# Patient Record
Sex: FEMALE | Race: WHITE | NOT HISPANIC OR LATINO | Employment: OTHER | URBAN - METROPOLITAN AREA
[De-identification: names, ages, dates, MRNs, and addresses within clinical notes are randomized per-mention and may not be internally consistent; named-entity substitution may affect disease eponyms.]

---

## 2017-01-26 ENCOUNTER — HOSPITAL ENCOUNTER (OUTPATIENT)
Dept: PET IMAGING | Facility: HOSPITAL | Age: 67
Discharge: HOME | End: 2017-01-26
Attending: INTERNAL MEDICINE | Admitting: INTERNAL MEDICINE

## 2017-01-26 ENCOUNTER — LAB (OUTPATIENT)
Dept: LAB | Facility: HOSPITAL | Age: 67
End: 2017-01-26

## 2017-01-26 DIAGNOSIS — C49.A3 GIST (GASTROINTESTINAL STROMAL TUMOR) OF SMALL BOWEL, MALIGNANT (HCC): ICD-10-CM

## 2017-01-26 DIAGNOSIS — D50.0 IRON DEFICIENCY ANEMIA DUE TO CHRONIC BLOOD LOSS: ICD-10-CM

## 2017-01-26 LAB
ALBUMIN SERPL-MCNC: 4.3 G/DL (ref 3.5–5.2)
ALBUMIN/GLOB SERPL: 1.4 G/DL (ref 1.1–2.4)
ALP SERPL-CCNC: 74 U/L (ref 38–116)
ALT SERPL W P-5'-P-CCNC: 22 U/L (ref 0–33)
ANION GAP SERPL CALCULATED.3IONS-SCNC: 13.1 MMOL/L
AST SERPL-CCNC: 22 U/L (ref 0–32)
BASOPHILS # BLD AUTO: 0.07 10*3/MM3 (ref 0–0.1)
BASOPHILS NFR BLD AUTO: 1.5 % (ref 0–1.1)
BILIRUB SERPL-MCNC: 0.6 MG/DL (ref 0.1–1.2)
BUN BLD-MCNC: 19 MG/DL (ref 6–20)
BUN/CREAT SERPL: 30.2 (ref 7.3–30)
CALCIUM SPEC-SCNC: 10.2 MG/DL (ref 8.5–10.2)
CHLORIDE SERPL-SCNC: 99 MMOL/L (ref 98–107)
CO2 SERPL-SCNC: 27.9 MMOL/L (ref 22–29)
CREAT BLD-MCNC: 0.63 MG/DL (ref 0.6–1.1)
CREAT BLDA-MCNC: 0.7 MG/DL (ref 0.6–1.3)
DEPRECATED RDW RBC AUTO: 41.7 FL (ref 37–49)
EOSINOPHIL # BLD AUTO: 0.17 10*3/MM3 (ref 0–0.36)
EOSINOPHIL NFR BLD AUTO: 3.7 % (ref 1–5)
ERYTHROCYTE [DISTWIDTH] IN BLOOD BY AUTOMATED COUNT: 12.3 % (ref 11.7–14.5)
FERRITIN SERPL-MCNC: 77.8 NG/ML
GFR SERPL CREATININE-BSD FRML MDRD: 94 ML/MIN/1.73
GLOBULIN UR ELPH-MCNC: 3 GM/DL (ref 1.8–3.5)
GLUCOSE BLD-MCNC: 60 MG/DL (ref 74–124)
HCT VFR BLD AUTO: 45 % (ref 34–45)
HGB BLD-MCNC: 14.2 G/DL (ref 11.5–14.9)
IMM GRANULOCYTES # BLD: 0.01 10*3/MM3 (ref 0–0.03)
IMM GRANULOCYTES NFR BLD: 0.2 % (ref 0–0.5)
LYMPHOCYTES # BLD AUTO: 1.17 10*3/MM3 (ref 1–3.5)
LYMPHOCYTES NFR BLD AUTO: 25.2 % (ref 20–49)
MCH RBC QN AUTO: 29.1 PG (ref 27–33)
MCHC RBC AUTO-ENTMCNC: 31.6 G/DL (ref 32–35)
MCV RBC AUTO: 92.2 FL (ref 83–97)
MONOCYTES # BLD AUTO: 0.45 10*3/MM3 (ref 0.25–0.8)
MONOCYTES NFR BLD AUTO: 9.7 % (ref 4–12)
NEUTROPHILS # BLD AUTO: 2.77 10*3/MM3 (ref 1.5–7)
NEUTROPHILS NFR BLD AUTO: 59.7 % (ref 39–75)
NRBC BLD MANUAL-RTO: 0 /100 WBC (ref 0–0)
PLATELET # BLD AUTO: 336 10*3/MM3 (ref 150–375)
PMV BLD AUTO: 9.7 FL (ref 8.9–12.1)
POTASSIUM BLD-SCNC: 3.6 MMOL/L (ref 3.5–4.7)
PROT SERPL-MCNC: 7.3 G/DL (ref 6.3–8)
RBC # BLD AUTO: 4.88 10*6/MM3 (ref 3.9–5)
SODIUM BLD-SCNC: 140 MMOL/L (ref 134–145)
WBC NRBC COR # BLD: 4.64 10*3/MM3 (ref 4–10)

## 2017-01-26 PROCEDURE — 0 IOPAMIDOL 61 % SOLUTION: Performed by: INTERNAL MEDICINE

## 2017-01-26 PROCEDURE — 82728 ASSAY OF FERRITIN: CPT

## 2017-01-26 PROCEDURE — 82565 ASSAY OF CREATININE: CPT

## 2017-01-26 PROCEDURE — 80053 COMPREHEN METABOLIC PANEL: CPT

## 2017-01-26 PROCEDURE — 36415 COLL VENOUS BLD VENIPUNCTURE: CPT

## 2017-01-26 PROCEDURE — 74177 CT ABD & PELVIS W/CONTRAST: CPT

## 2017-01-26 PROCEDURE — 71260 CT THORAX DX C+: CPT

## 2017-01-26 PROCEDURE — 85025 COMPLETE CBC W/AUTO DIFF WBC: CPT

## 2017-01-26 PROCEDURE — 25510000001 DIATRIZOATE MEGLUMINE & SODIUM PER 1 ML: Performed by: INTERNAL MEDICINE

## 2017-01-26 RX ADMIN — DIATRIZOATE MEGLUMINE AND DIATRIZOATE SODIUM 30 ML: 660; 100 LIQUID ORAL; RECTAL at 08:00

## 2017-01-26 RX ADMIN — IOPAMIDOL 85 ML: 612 INJECTION, SOLUTION INTRAVENOUS at 08:47

## 2017-02-02 ENCOUNTER — APPOINTMENT (OUTPATIENT)
Dept: LAB | Facility: HOSPITAL | Age: 67
End: 2017-02-02

## 2017-02-02 ENCOUNTER — OFFICE VISIT (OUTPATIENT)
Dept: ONCOLOGY | Facility: CLINIC | Age: 67
End: 2017-02-02

## 2017-02-02 VITALS
WEIGHT: 146.4 LBS | SYSTOLIC BLOOD PRESSURE: 112 MMHG | DIASTOLIC BLOOD PRESSURE: 70 MMHG | RESPIRATION RATE: 16 BRPM | HEART RATE: 76 BPM | BODY MASS INDEX: 25 KG/M2 | TEMPERATURE: 98.3 F | HEIGHT: 64 IN

## 2017-02-02 DIAGNOSIS — D50.0 IRON DEFICIENCY ANEMIA DUE TO CHRONIC BLOOD LOSS: ICD-10-CM

## 2017-02-02 DIAGNOSIS — C49.A3 GIST (GASTROINTESTINAL STROMAL TUMOR) OF SMALL BOWEL, MALIGNANT (HCC): Primary | ICD-10-CM

## 2017-02-02 PROCEDURE — 99213 OFFICE O/P EST LOW 20 MIN: CPT | Performed by: INTERNAL MEDICINE

## 2017-02-02 PROCEDURE — G0463 HOSPITAL OUTPT CLINIC VISIT: HCPCS | Performed by: INTERNAL MEDICINE

## 2017-02-02 NOTE — PROGRESS NOTES
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  HISTORY OF PRESENT ILLNESS:     History of Present Illness     Mrs. Comer is here today for her first outpatient visit to our office.  She had been seen in consultation during her recent admission to [facility] from 2/20/2016 to 2/26/2016.  She was admitted at that time with symptomatic anemia and GI bleeding.  On endoscopy the blood appeared to be coming from the region of the appendix and she underwent a laparoscopic appendectomy with Dr. Meraz on 2/22/2016.  Also at the same procedure she was found to have a tumor within the small bowel and had a partial small bowel resection with reanastomosis.  The pathology from that specimen showed a gastrointestinal stromal tumor (GIST) .  The tumor was 4.5 cm and appeared to be low-grade with 0 mitotic figures per high-powered field.    We discussed with Miss Comer at that time that we would not recommend any postop adjuvant Gleevec therapy as her prognosis was very good with surgery alone.  We did however discuss some follow-up in the office with plans to perform surveillance CT scans for a few years after surgery.    She underwent CT scans on 1/26/2017 which showed postop changes in the small bowel but no obvious recurrent or metastatic disease.  Her blood counts and iron studies and chemistries all look good at this point.  She reports that she is feeling fine.  Unfortunately, her  passed away in September due to metastatic esophageal cancer and she is still grieving.    Past Medical History, Past Surgical History, Social History, Family History have been reviewed and are without significant changes except as mentioned.    Review of Systems   Constitutional: Negative for activity change, appetite change, fatigue, fever and unexpected weight change.   HENT: Negative for hearing loss, nosebleeds, trouble swallowing and voice change.    Eyes: Negative for visual disturbance.  "  Respiratory: Negative for cough, shortness of breath and wheezing.    Cardiovascular: Negative for chest pain and palpitations.   Gastrointestinal: Negative for abdominal pain, diarrhea, nausea and vomiting.   Genitourinary: Negative for difficulty urinating, frequency, hematuria and urgency.   Musculoskeletal: Negative for back pain and neck pain.   Skin: Negative for rash.   Neurological: Negative for dizziness, seizures, syncope and headaches.   Hematological: Negative for adenopathy. Does not bruise/bleed easily.   Psychiatric/Behavioral: Negative for behavioral problems. The patient is not nervous/anxious.       A comprehensive 14 point review of systems was performed and was negative except as mentioned.    Medications:  The current medication list was reviewed in the EMR    ALLERGIES:    Allergies   Allergen Reactions   • Penicillins Rash     Rash over whole body       Objective      Vitals:    02/02/17 1051   BP: 112/70   Pulse: 76   Resp: 16   Temp: 98.3 °F (36.8 °C)   Weight: 146 lb 6.4 oz (66.4 kg)   Height: 63.78\" (162 cm)  Comment: new ht.   PainSc: 0-No pain     Current Status 2/2/2017   ECOG score 0       Physical Exam   Constitutional: She is oriented to person, place, and time. She appears well-developed and well-nourished. No distress.   HENT:   Head: Normocephalic.   Eyes: Conjunctivae and EOM are normal. Pupils are equal, round, and reactive to light. No scleral icterus.   Neck: Normal range of motion. Neck supple. No JVD present. No thyromegaly present.   Cardiovascular: Normal rate and regular rhythm.  Exam reveals no gallop and no friction rub.    No murmur heard.  Pulmonary/Chest: Effort normal and breath sounds normal. She has no wheezes. She has no rales.   Abdominal: Soft. She exhibits no distension and no mass. There is no tenderness.   Musculoskeletal: Normal range of motion. She exhibits no edema or deformity.   Lymphadenopathy:     She has no cervical adenopathy.   Neurological: She " is alert and oriented to person, place, and time. She has normal reflexes. No cranial nerve deficit.   Skin: Skin is warm and dry. No rash noted. No erythema.   Psychiatric: She has a normal mood and affect. Her behavior is normal. Judgment normal.        RECENT LABS:  Hematology WBC   Date Value Ref Range Status   01/26/2017 4.64 4.00 - 10.00 10*3/mm3 Final   03/08/2016 5.67 4.50 - 10.70 10*3/mm3 Final     RBC   Date Value Ref Range Status   01/26/2017 4.88 3.90 - 5.00 10*6/mm3 Final   03/08/2016 4.20 3.90 - 5.20 10*6/mm3 Final     HEMOGLOBIN   Date Value Ref Range Status   01/26/2017 14.2 11.5 - 14.9 g/dL Final   03/08/2016 12.0 11.9 - 15.5 g/dL Final     HEMATOCRIT   Date Value Ref Range Status   01/26/2017 45.0 34.0 - 45.0 % Final   03/08/2016 39.3 35.6 - 45.5 % Final     PLATELETS   Date Value Ref Range Status   01/26/2017 336 150 - 375 10*3/mm3 Final   03/08/2016 549 (H) 140 - 500 10*3/mm3 Final         CT C/A/P 1/26/2017     IMPRESSION:  Stable examination. There is no evidence for recurrence or  metastatic disease. No new abnormality is seen.    Ferritin ng/mL 77.80     Lab Results   Component Value Date    GLUCOSE 60 (L) 01/26/2017    BUN 19 01/26/2017    CREATININE 0.70 01/26/2017    EGFRIFNONA 94 01/26/2017    EGFRIFAFRI 109 03/08/2016    BCR 30.2 (H) 01/26/2017    CO2 27.9 01/26/2017    CALCIUM 10.2 01/26/2017    PROTENTOTREF 7.1 03/02/2015    ALBUMIN 4.30 01/26/2017    LABIL2 1.4 01/26/2017    AST 22 01/26/2017    ALT 22 01/26/2017             Assessment/Plan      1.  Gastrointestinal stromal tumor of the small bowel resected 2/22/2016 by Dr. Meraz with good margins.  The tumor appeared to be low-grade with good prognostic features.  Patient continues to do well and CT scans show no definite evidence of recurrent or metastatic disease.  2.  Iron deficiency anemia due to GI blood loss. Resolved.     Plan    1. We will schedule a follow-up visit in our office in 12 months with repeat CT scan and labs one  week prior to the visit.                    2/2/2017      CC:

## 2017-03-14 DIAGNOSIS — Z11.59 NEED FOR HEPATITIS C SCREENING TEST: ICD-10-CM

## 2017-03-14 DIAGNOSIS — E78.2 MIXED HYPERLIPIDEMIA: Primary | ICD-10-CM

## 2017-03-15 ENCOUNTER — RESULTS ENCOUNTER (OUTPATIENT)
Dept: INTERNAL MEDICINE | Facility: CLINIC | Age: 67
End: 2017-03-15

## 2017-03-15 DIAGNOSIS — Z11.59 NEED FOR HEPATITIS C SCREENING TEST: ICD-10-CM

## 2017-03-15 DIAGNOSIS — E78.2 MIXED HYPERLIPIDEMIA: ICD-10-CM

## 2017-03-16 LAB
CHOLEST SERPL-MCNC: 241 MG/DL (ref 0–200)
HCV AB S/CO SERPL IA: 0.1 S/CO RATIO (ref 0–0.9)
HDLC SERPL-MCNC: 99 MG/DL (ref 40–60)
LDLC SERPL CALC-MCNC: 132 MG/DL (ref 0–100)
TRIGL SERPL-MCNC: 51 MG/DL (ref 0–150)
VLDLC SERPL CALC-MCNC: 10.2 MG/DL (ref 5–40)

## 2017-03-21 RX ORDER — OMEPRAZOLE 40 MG/1
CAPSULE, DELAYED RELEASE ORAL
Qty: 30 CAPSULE | Refills: 3 | Status: SHIPPED | OUTPATIENT
Start: 2017-03-21 | End: 2017-07-18 | Stop reason: SDUPTHER

## 2017-03-22 ENCOUNTER — OFFICE VISIT (OUTPATIENT)
Dept: INTERNAL MEDICINE | Facility: CLINIC | Age: 67
End: 2017-03-22

## 2017-03-22 VITALS
HEIGHT: 64 IN | SYSTOLIC BLOOD PRESSURE: 118 MMHG | RESPIRATION RATE: 18 BRPM | DIASTOLIC BLOOD PRESSURE: 70 MMHG | OXYGEN SATURATION: 98 % | WEIGHT: 147 LBS | BODY MASS INDEX: 25.1 KG/M2 | HEART RATE: 74 BPM

## 2017-03-22 DIAGNOSIS — Z85.09 HISTORY OF GASTROINTESTINAL STROMAL TUMOR (GIST): ICD-10-CM

## 2017-03-22 DIAGNOSIS — Z00.00 MEDICARE ANNUAL WELLNESS VISIT, SUBSEQUENT: Primary | ICD-10-CM

## 2017-03-22 DIAGNOSIS — K62.1 RECTAL POLYP: ICD-10-CM

## 2017-03-22 DIAGNOSIS — E78.2 MIXED HYPERLIPIDEMIA: ICD-10-CM

## 2017-03-22 DIAGNOSIS — M25.512 ACUTE PAIN OF LEFT SHOULDER: ICD-10-CM

## 2017-03-22 PROBLEM — D50.0 IRON DEFICIENCY ANEMIA DUE TO CHRONIC BLOOD LOSS: Status: RESOLVED | Noted: 2017-02-02 | Resolved: 2017-03-22

## 2017-03-22 PROCEDURE — 99213 OFFICE O/P EST LOW 20 MIN: CPT | Performed by: INTERNAL MEDICINE

## 2017-03-22 PROCEDURE — G0439 PPPS, SUBSEQ VISIT: HCPCS | Performed by: INTERNAL MEDICINE

## 2017-03-22 NOTE — PROGRESS NOTES
Medicare Annual Wellness Visit    Chief Complaint:  Annual Exam (SUB AWV); Shoulder Pain; Colon Polyps; and Hyperlipidemia      History of Present Illness    Ashley Comer is a 66 y.o. female who presents for an Annual Wellness Visit. In addition, we addressed the following health issues:    Mammo:10/28/15, 11/2016  Pap:10/28/15 [facility]  DEXA: she has this every two to three years at [facility].    C-scope:2/21/16  Flu shot:10/1/2016  Dental Exam: two months ago.  She goes every six months.    Eye Exam: December 2016  Exercise: she is walking with her sister regularly.  Yoga on Mondays.      Advanced Care Planning:  has an advanced directive - a copy HAS NOT been provided   Immunization History   Administered Date(s) Administered   • Pneumococcal Conjugate 13-Valent 06/22/2015   • Pneumococcal Polysaccharide 03/16/2016   • Tdap 02/22/2012     She has some achiness in her left shoulder.  She has some tingling in the deltoid area.  It's been ongoing for a few week.  No injury.      HLD - she has had a total cholesterol over 200 for her whole life.  She has maintained a nice high HDL.  No CP or palp.      GIST resection was done in Feb of 2016.  She had a GIB and it was discovered at this time.  Dr. Rodriguez did EGD and c-scope.  On the c-scope she had a rectal polyp that was not removed.  She is wondering when she needs to have a repeat c-scope.  Dr Meraz is who did her surgery.  No blood in her stools.  No change in bm.      Her  passed away in November from esophageal cancer.  The two of them had been diagnosed with their cancers within days of each other.  She has been doing ok.  Her family is very supportive.      Review of Systems   Constitutional: Negative for chills, fatigue and fever.   HENT: Negative for hearing loss, sore throat, tinnitus, trouble swallowing and voice change.    Eyes: Negative for visual disturbance.   Respiratory: Negative for cough and shortness of breath.    Cardiovascular:  Positive for palpitations (occ). Negative for chest pain and leg swelling.   Gastrointestinal: Negative for abdominal distention, abdominal pain, anal bleeding, blood in stool, constipation, diarrhea, nausea and vomiting.   Endocrine: Negative for polydipsia and polyuria.   Genitourinary: Negative for dysuria and hematuria.   Musculoskeletal: Positive for arthralgias (left shoulder) and neck pain. Negative for myalgias.   Skin: Negative for rash and wound.   Neurological: Negative for dizziness, syncope, light-headedness and headaches.   Hematological: Negative for adenopathy. Does not bruise/bleed easily.   Psychiatric/Behavioral: Negative for confusion and dysphoric mood. The patient is not nervous/anxious.        Past Medical History:   Diagnosis Date   • Anemia    • Blood tests for routine general physical examination    • Edema    • Esophageal reflux    • IFG (impaired fasting glucose)    • Migraine    • 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 Jose Enrique Yuan.        Family  History   Problem Relation Age of Onset   • Dementia Mother    • Hypertension Mother    • Tuberculosis Mother    • Prostate cancer Father    • Tuberculosis Father    • Cancer Sister      teratoma, malignant    • Breast cancer Maternal Aunt    • Lung cancer Paternal Aunt    • Brain cancer Paternal Aunt    • Breast cancer Paternal Aunt    • Breast cancer Sister        Allergies   Allergen Reactions   • Penicillins Rash     Rash over whole body       Current Outpatient Prescriptions on File Prior to Visit   Medication Sig Dispense Refill   • B Complex-C (SUPER B COMPLEX PO) Take 1 tablet by mouth daily. As directed     • calcium citrate-vitamin d (CITRACAL) 200-250 MG-UNIT tablet tablet Take  by mouth 2 (two) times a day.     • Cholecalciferol (VITAMIN D3) 1000 UNITS capsule Take  by mouth. Take as directed     • Coenzyme Q10 (CO Q 10 PO) Take 100 mg by mouth daily.     • glucosamine-chondroitin 500-400 MG capsule capsule Take  by mouth 3 (three) times a day with meals.     • HYALURONIC ACID-VITAMIN C PO Take  by mouth.     • Misc Natural Products (OSTEO BI-FLEX TRIPLE STRENGTH PO) Take 2 tablets by mouth.     • Multiple Vitamins-Minerals (MULTI FOR HER) tablet Take 1 tablet by mouth daily.     • omeprazole (priLOSEC) 40 MG capsule TAKE ONE CAPSULE BY MOUTH DAILY 30 capsule 3   • Psyllium 500 MG capsule Take  by mouth. Take as directed     • rizatriptan (MAXALT) 10 MG tablet Take  by mouth. Take 1 tablet at onset of headache; may repeat 2 hours later for x 1 dose; max of 2/day     • [DISCONTINUED] estradiol (VAGIFEM) 10 MCG tablet vaginal tablet Insert  into the vagina.       No current facility-administered medications on file prior to visit.        Patient Active Problem List   Diagnosis   • Migraine   • GIST (gastrointestinal stromal tumor) of small bowel, malignant   • Hoarseness of voice   • Mixed hyperlipidemia   • Acute pain of left shoulder   • History of gastrointestinal stromal tumor (GIST)   • Rectal polyp  "      Health Risk Assessment/Depression Screen/Functional and Cognitive Screening:   The patient has completed a Health Risk Assessment & Depression screen. These have been reviewed with them and have been scanned as a separate documents.    Age-appropriate Screening Schedule:  Refer to the list below for future screening recommendations based on patient's age. Orders for these recommended tests are listed in the plan section. The patient has been provided with a written plan.     Vitals:    03/22/17 0832   BP: 118/70   Pulse: 74   Resp: 18   SpO2: 98%   Weight: 147 lb (66.7 kg)   Height: 63.5\" (161.3 cm)       Body mass index is 25.63 kg/(m^2).    Physical Exam   Constitutional: She is oriented to person, place, and time. She appears well-developed and well-nourished. No distress.   HENT:   Head: Normocephalic and atraumatic.   Nose: Nose normal.   Mouth/Throat: Oropharynx is clear and moist.   Eyes: Conjunctivae and EOM are normal. Pupils are equal, round, and reactive to light. No scleral icterus.   Neck: Normal range of motion. Neck supple. No thyromegaly present.   Cardiovascular: Normal rate, regular rhythm and normal heart sounds.  Exam reveals no gallop and no friction rub.    No murmur heard.  Pulses:       Carotid pulses are 2+ on the right side, and 2+ on the left side.       Femoral pulses are 2+ on the right side, and 2+ on the left side.       Dorsalis pedis pulses are 2+ on the right side, and 2+ on the left side.        Posterior tibial pulses are 2+ on the right side, and 2+ on the left side.   Pulmonary/Chest: Effort normal and breath sounds normal. No respiratory distress. She has no wheezes. She has no rales. Right breast exhibits no mass and no nipple discharge. Left breast exhibits no mass and no nipple discharge.   Abdominal: Soft. Bowel sounds are normal. She exhibits no distension and no mass. There is no tenderness.   Musculoskeletal: Normal range of motion. She exhibits no edema.       " Vascular Status -  Her exam exhibits no right foot edema. Her exam exhibits no left foot edema.   Skin Integrity  -  Her right foot skin is intact.     Ashley 's left foot skin is intact. .  Lymphadenopathy:     She has no cervical adenopathy.     She has no axillary adenopathy.        Right: No inguinal and no supraclavicular adenopathy present.        Left: No inguinal and no supraclavicular adenopathy present.   Neurological: She is alert and oriented to person, place, and time. She has normal reflexes. No cranial nerve deficit.   Skin: Skin is warm and dry.   Psychiatric: She has a normal mood and affect. Her speech is normal and behavior is normal. Judgment and thought content normal. Cognition and memory are normal.   Vitals reviewed.      Results for orders placed or performed in visit on 03/15/17   Lipid Panel   Result Value Ref Range    Total Cholesterol 241 (H) 0 - 200 mg/dL    Triglycerides 51 0 - 150 mg/dL    HDL Cholesterol 99 (H) 40 - 60 mg/dL    VLDL Cholesterol 10.2 5 - 40 mg/dL    LDL Cholesterol  132 (H) 0 - 100 mg/dL   Hepatitis C Antibody   Result Value Ref Range    Hep C Virus Ab 0.1 0.0 - 0.9 s/co ratio       Assessment & Plan:    Diagnoses and all orders for this visit:    Medicare annual wellness visit, subsequent    Acute pain of left shoulder  -     Ambulatory Referral to Physical Therapy Evaluate and treat    Mixed hyperlipidemia    History of gastrointestinal stromal tumor (GIST)    Rectal polyp      Discussion/Summary  Ashley is here for subsequent AWV and follow up.  She is up to date on all health maintenance.  We will obtain her mammo and dexa report from [facility].  She is up to date on immunizations.  Her blood work all looks good.  I have reviewed all of the labs done since January.  Advised to continue with her regular exercise.  I have given her an order for PT for her left shoulder/neck pain. She had a polyp on the c-scope last year that was not removed.  I have asked her to  call Dr. Meraz's office and ask if she should have a c-scope.  I think she probably should to have this polyp removed.  If she needs a referral, we will put that in.        Follow Up:  No Follow-up on file.     An After Visit Summary and PPPS with all of these plans were given to the patient.

## 2017-03-22 NOTE — PATIENT INSTRUCTIONS
Medicare Wellness  Personal Prevention Plan of Service     Date of Office Visit:  2017  Encounter Provider:  Gwen Patterson MD  Place of Service:  [facility] INTERNAL MEDICINE  Patient Name: Ashley Comer  :  1950    As part of the Medicare Wellness portion of your visit today, we are providing you with this personalized preventive plan of services (PPPS). This plan is based upon recommendations of the [place] Preventive Services Task Force (USPSTF) and the Advisory Committee on Immunization Practices (ACIP).    This lists the preventive care services that should be considered, and provides dates of when you are due. Items listed as completed are up-to-date and do not require any further intervention.    Health Maintenance   Topic Date Due   • DXA SCAN  2016   • LIPID PANEL  03/15/2018   • MEDICARE ANNUAL WELLNESS  2018   • TDAP/TD VACCINES (2 - Td) 2022   • COLONOSCOPY  2026   • HEPATITIS C SCREENING  Completed   • INFLUENZA VACCINE  Completed   • PNEUMOCOCCAL VACCINES (65+ LOW/MEDIUM RISK)  Completed   • ZOSTER VACCINE  Completed

## 2017-03-31 ENCOUNTER — HOSPITAL ENCOUNTER (OUTPATIENT)
Dept: PHYSICAL THERAPY | Facility: HOSPITAL | Age: 67
Setting detail: THERAPIES SERIES
Discharge: HOME | End: 2017-03-31

## 2017-03-31 DIAGNOSIS — M25.512 ACUTE PAIN OF LEFT SHOULDER: Primary | ICD-10-CM

## 2017-03-31 PROCEDURE — G8984 CARRY CURRENT STATUS: HCPCS

## 2017-03-31 PROCEDURE — 97161 PT EVAL LOW COMPLEX 20 MIN: CPT | Performed by: PHYSICAL THERAPIST

## 2017-03-31 PROCEDURE — G8985 CARRY GOAL STATUS: HCPCS

## 2017-03-31 PROCEDURE — 97110 THERAPEUTIC EXERCISES: CPT | Performed by: PHYSICAL THERAPIST

## 2017-04-06 ENCOUNTER — HOSPITAL ENCOUNTER (OUTPATIENT)
Dept: PHYSICAL THERAPY | Facility: HOSPITAL | Age: 67
Setting detail: THERAPIES SERIES
Discharge: HOME | End: 2017-04-06

## 2017-04-06 DIAGNOSIS — M25.512 ACUTE PAIN OF LEFT SHOULDER: Primary | ICD-10-CM

## 2017-04-06 PROCEDURE — 97012 MECHANICAL TRACTION THERAPY: CPT

## 2017-04-06 PROCEDURE — 97110 THERAPEUTIC EXERCISES: CPT

## 2017-04-06 PROCEDURE — 97140 MANUAL THERAPY 1/> REGIONS: CPT

## 2017-04-06 NOTE — PROGRESS NOTES
Outpatient Physical Therapy Ortho Treatment Note  [facility]     Patient Name: Ashley Comer  : 1950  MRN: [id number]  Today's Date: 2017      Visit Date: 2017    Visit Dx:    ICD-10-CM ICD-9-CM   1. 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        Past Surgical History:   Procedure Laterality Date   • CATARACT EXTRACTION     • FOOT SURGERY     • LAPAROSCOPIC APPENDECTOMY     • SMALL INTESTINE SURGERY  2016    for GIST    • THYROID SURGERY      thyroid lobectomy    • VITRECTOMY Right 2015                             PT Assessment/Plan       17 1554       PT Assessment    Assessment Comments Pt returns for initial follow up after evaulation and states that she is mildly sore from HEP, however performing daily. Added retraction and stretching exercsies today for pt to perform at home as tolerated. Initiated trial of traction with positive response from pt.   -CN     PT Plan    PT Plan Comments Assess response to cervical traction and continue if beneficial. Consider trial of DDN next visit and progress postural stabilization acitivities as tolerated.   -CN       User Key  (r) = Recorded By, (t) = Taken By, (c) = Cosigned By    Initials Name Provider Type    ZENAIDA Jacobs, PT Physical Therapist                Modalities       17 1400          Moist Heat    MH Applied  Yes  -CN      Location cervical spine with mechanical traction  -CN      Rx Minutes 10 mins  -CN      Traction 61419    Traction Type Cervical  -CN      Rx Minutes 10  -CN      Duration Intermittent  -CN      Position Hook-lying  -CN      Weight 15   /7  -CN      Hold 40  -CN      Relax 10  -CN        User Key  (r) = Recorded By, (t) = Taken By, (c) = Cosigned By    Initials Name Provider Type    ZENAIDA Jacobs, SIMBA Physical Therapist                Exercises       04/06/17 1400          Subjective Comments    Subjective Comments I am a little sore from the exercsies, but I'm not having any pain today. It feels about the same as last time.  -CN      Subjective Pain    Able to rate subjective pain? yes  -CN      Pre-Treatment Pain Level 0  -CN      Post-Treatment Pain Level 0  -CN      Exercise 1    Exercise Name 1 Reverse shoulder rolls  -CN      Reps 1 10  -CN      Exercise 2    Exercise Name 2 Rows  -CN      Cueing 2 Demo  -CN      Resistance 2 Red  -CN      Reps 2 15  -CN      Exercise 3    Exercise Name 3 Chin tucks  -CN      Reps 3 10  -CN      Exercise 4    Exercise Name 4 UBE  -CN      Time (Minutes) 4 4  -CN      Exercise 5    Exercise Name 5 Shoulder extension  -CN      Cueing 5 Demo  -CN      Resistance 5 Red  -CN      Reps 5 15  -CN      Exercise 6    Exercise Name 6 Pec stretch  -CN      Cueing 6 Demo  -CN      Reps 6 3  -CN      Time (Seconds) 6 20  -CN      Exercise 7    Exercise Name 7 Levator stretch  -CN      Cueing 7 Demo  -CN      Reps 7 3  -CN      Time (Seconds) 7 20  -CN        User Key  (r) = Recorded By, (t) = Taken By, (c) = Cosigned By    Initials Name Provider Type    ZENAIDA Jacobs, SIMBA Physical Therapist                        Manual Rx (last 36 hours)      Manual Treatments       04/06/17 1300          Manual Rx 1    Manual Rx 1 Location stm to UT, levator scap and suboccipital musculature  -CN      Manual Rx 1 Duration 5 min  -CN      Manual Rx 2    Manual Rx 2  Location PIVMs and downglides to cervical spine  -CN      Manual Rx 2 Duration 6 min  -CN        User Key  (r) = Recorded By, (t) = Taken By, (c) = Cosigned By    Initials Name Provider Type    ZENAIDA Jacobs PT Physical Therapist                PT OP Goals       04/06/17 1400       PT Short Term Goals    STG Date to Achieve 04/14/17  -CN     STG 1 Pt. will be independent and compliant with initial home exercise program.   -CN     STG 1 Progress Progressing  -CN     STG 1 Progress Comments Pt performing HEP daily, however causing slight muscle soreness.   -CN     STG 2 Pt. will report L shoulder/neck pain </=4-5/10 to increase ease of reading.   -CN     STG 2 Progress Ongoing  -CN     STG 2 Progress Comments Pt reports no change in pain since onset of PT.   -CN     STG 3 Pt. will report compliance with postural awarenes and positioning for optimal posure.   -CN     STG 3 Progress Ongoing  -CN     Long Term Goals    LTG Date to Achieve 04/30/17  -CN     LTG 1 Pt. will be independent and compliant with advanced home exercise program.   -CN     LTG 1 Progress Ongoing  -CN     LTG 2 Pt. will report L neck/shoulder pain </= 2-3/10 to increase ease of faling asleep.   -CN     LTG 2 Progress Ongoing  -CN       User Key  (r) = Recorded By, (t) = Taken By, (c) = Cosigned By    Initials Name Provider Type    ZENAIDA Jacobs PT Physical Therapist                Therapy Education       04/06/17 1440          Therapy Education    Given HEP;Symptoms/condition management;Pain management;Posture/body mechanics  -CN      Program Progressed  -CN      How Provided Verbal;Demonstration;Written  -CN      Provided to Patient  -CN      Level of Understanding Teach back education performed  -CN        User Key  (r) = Recorded By, (t) = Taken By, (c) = Cosigned By    Initials Name Provider Type    ZENAIDA Jacobs PT Physical Therapist                Time Calculation:   Start Time: 1400  Stop Time:  1445  Time Calculation (min): 45 min    Therapy Charges for Today     Code Description Service Date Service Provider Modifiers Qty    63722738933 HC PT THER PROC EA 15 MIN 4/6/2017 Babs Jacobs, PT GP 1    12294145626 HC PT MANUAL THERAPY EA 15 MIN 4/6/2017 Babs Jacobs, PT 58, GP 1    80490498781 HC PT-TRACTION MECHANICAL 4/6/2017 Babs Jacobs, PT 58 1    33939664375 HC PT HOT OR COLD PACK TREAT [facility] 4/6/2017 Babs Jacobs, PT GP 1                    Babs Jacobs, PT  4/6/2017

## 2017-04-10 ENCOUNTER — HOSPITAL ENCOUNTER (OUTPATIENT)
Dept: PHYSICAL THERAPY | Facility: HOSPITAL | Age: 67
Setting detail: THERAPIES SERIES
Discharge: HOME | End: 2017-04-10

## 2017-04-10 DIAGNOSIS — M25.512 ACUTE PAIN OF LEFT SHOULDER: Primary | ICD-10-CM

## 2017-04-10 PROCEDURE — 97110 THERAPEUTIC EXERCISES: CPT | Performed by: PHYSICAL THERAPIST

## 2017-04-10 PROCEDURE — 97012 MECHANICAL TRACTION THERAPY: CPT | Performed by: PHYSICAL THERAPIST

## 2017-04-10 NOTE — PROGRESS NOTES
Outpatient Physical Therapy Ortho Treatment Note  [facility]     Patient Name: Ashley Comer  : 1950  MRN: [id number]  Today's Date: 4/10/2017      Visit Date: 04/10/2017    Visit Dx:    ICD-10-CM ICD-9-CM   1. 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        Past Surgical History:   Procedure Laterality Date   • CATARACT EXTRACTION     • FOOT SURGERY     • LAPAROSCOPIC APPENDECTOMY     • SMALL INTESTINE SURGERY  2016    for GIST    • THYROID SURGERY      thyroid lobectomy    • VITRECTOMY Right 2015                             PT Assessment/Plan       04/10/17 0017       PT Assessment    Assessment Comments Pt. reporting increased pain after gardening, likely expected muscle soreness or exacerbated underlying symptoms (cervical?). Progressed ther ex including foam roller work today, pt. recently purchased one at home. Continued traction.   -KJ     PT Plan    PT Plan Comments Assess response to progressed ther ex, traction. Possibly DDN if indicated.   -GARRY       User Key  (r) = Recorded By, (t) = Taken By, (c) = Cosigned By    Initials Name Provider Type    GARRY Gayle, PT Physical Therapist                Modalities       04/10/17 0900          Subjective Comments    Subjective Comments I was pretty sore last night, through the night it hurt. I did some gardening  yesterday so maybe that was  it. 4/10 last night, 2/10 this morning. No pain now. The traction felt great. I felt most of it around L back (points to L scapula, inferior region).   -KJ      Subjective Pain    Pre-Treatment Pain Level 0  -KJ      Moist Heat    Location cervical spine with mechanical traction  -KJ      Rx Minutes 12 mins  -KJ      Traction 72595    Traction Type Cervical  -KJ      Rx Minutes 12  -KJ      Position Hook-lying  -KJ      Weight 15   /7  -KJ      Hold 45  -KJ      Relax 10  -KJ      Progression --   Attempted increase by 1#, felt too strong. Two min longer  -KJ        User Key  (r) = Recorded By, (t) = Taken By, (c) = Cosigned By    Initials Name Provider Type     Megan Gayle PT Physical Therapist                Exercises       04/10/17 0900          Subjective Comments    Subjective Comments I was pretty sore last night, through the night it hurt. I did some gardening  yestserday so maybe that was it. 4/10 last night, 2/10 this morning. No pain now. The traction felt great. I felt most of it around L back (points to L scapula, inferior region).   -KJ      Subjective Pain    Pre-Treatment Pain Level 0  -KJ      Exercise 1    Exercise Name 1 Reverse shoulder rolls  -KJ      Weights/Plates 1 3  -KJ      Reps 1 15  -KJ      Exercise 2    Exercise Name 2 Rows  -KJ      Cueing 2 Demo  -KJ      Resistance 2 Red  -KJ      Reps 2 15  -KJ      Exercise 4    Exercise Name 4 UBE  -KJ      Weights/Plates 4 1  -KJ      Time (Minutes) 4 5  -KJ      Exercise 5    Exercise Name 5 Shoulder extension  -KJ      Cueing 5 Demo  -KJ      Resistance 5 Red  -KJ      Reps 5 15  -KJ      Exercise 6    Exercise Name 6 Pec stretch  -KJ      Cueing 6 Verbal  -KJ      Reps 6 3  -KJ      Time (Seconds) 6 20  -KJ      Exercise 8    Exercise Name 8 Foam roller- single UE flexion, double UE flexion  -KJ      Cueing 8 Verbal  -KJ      Reps 8 10   ea  -KJ      Exercise 9    Exercise Name 9 Instructed in foam roller horizontal rolling for  thoracic spine  -KJ      Cueing 9 Verbal   tactile, demo  -KJ      Time (Minutes) 9 2  -KJ      Exercise 10    Exercise Name 10 Scapular retraction over foam roller.   -KJ      Reps 10 10  -KJ        User Key  (r) = Recorded By, (t) = Taken By, (c) = Cosigned By    Initials Name Provider Type    GARRY Gayle PT Physical Therapist                                   Therapy Education       04/10/17 1457          Therapy Education    Given HEP;Symptoms/condition management;Pain management;Posture/body mechanics  -KJ      Program Progressed  -KJ      How Provided Verbal;Demonstration  -KJ      Provided to Patient  -KJ      Level of Understanding Teach back education performed  -KJ        User Key  (r) = Recorded By, (t) = Taken By, (c) = Cosigned By    Initials Name Provider Type    GARRY Gayle PT Physical Therapist                Time Calculation:   Start Time: 1010  Stop Time: 1100  Time Calculation (min): 50 min    Therapy Charges for Today     Code Description Service Date Service Provider Modifiers Qty    59850540801 HC PT HOT OR COLD PACK TREAT MCARE 4/10/2017 Megan Gayle, PT GP 1    25581309538 HC PT TRACTION CERVICAL 4/10/2017 Megan Gayle, PT GP 1    07751014047 HC PT THER PROC EA 15 MIN 4/10/2017 Megan Gayle, PT GP 2                    Megan Gayle, PT  4/10/2017

## 2017-04-13 ENCOUNTER — APPOINTMENT (OUTPATIENT)
Dept: PHYSICAL THERAPY | Facility: HOSPITAL | Age: 67
End: 2017-04-13

## 2017-04-17 ENCOUNTER — HOSPITAL ENCOUNTER (OUTPATIENT)
Dept: PHYSICAL THERAPY | Facility: HOSPITAL | Age: 67
Setting detail: THERAPIES SERIES
Discharge: HOME | End: 2017-04-17

## 2017-04-17 DIAGNOSIS — M25.512 ACUTE PAIN OF LEFT SHOULDER: Primary | ICD-10-CM

## 2017-04-17 PROCEDURE — 97140 MANUAL THERAPY 1/> REGIONS: CPT | Performed by: PHYSICAL THERAPIST

## 2017-04-17 PROCEDURE — 97012 MECHANICAL TRACTION THERAPY: CPT | Performed by: PHYSICAL THERAPIST

## 2017-04-17 NOTE — PROGRESS NOTES
Outpatient Physical Therapy Ortho Treatment Note  [facility]     Patient Name: Ashley Comer  : 1950  MRN: [id number]  Today's Date: 2017      Visit Date: 2017    Visit Dx:    ICD-10-CM ICD-9-CM   1. 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        Past Surgical History:   Procedure Laterality Date   • CATARACT EXTRACTION     • FOOT SURGERY     • LAPAROSCOPIC APPENDECTOMY     • SMALL INTESTINE SURGERY  2016    for GIST    • THYROID SURGERY      thyroid lobectomy    • VITRECTOMY Right 2015                             PT Assessment/Plan       17 1017       PT Assessment    Assessment Comments Initiated trial of DDN today with good response. Pt. reporting overall relief, still showing signs of cervical radiculopathy and trigger point dysfunction.   -KJ     PT Plan    PT Plan Comments Assess response to DDN,continue weekly if helpful. Progress traction, continue needling if helpful.   -GARRY       User Key  (r) = Recorded By, (t) = Taken By, (c) = Cosigned By    Initials Name Provider Type    GARRY Gayle, PT Physical Therapist                Modalities       17 0900          Subjective Comments    Subjective Comments I have to say I think it's getting better. I was busy all day with [place] and I didn't even notice it until I started doing my exercies and afterward, and not  severe. I think this is helping.   -KJ      Subjective Pain    Pre-Treatment Pain Level 1  -KJ      Moist Heat    Location cervical spine with mechanical traction  -KJ      Rx Minutes 12 mins  -KJ      Ice    Location L UT area after needling  -KJ      Rx Minutes 10 mins  -KJ      Traction 71532    Traction Type Cervical  -KJ      Rx Minutes 13  -KJ      Position Hook-lying  -KJ      Weight 16   /8  -KJ      Hold 45  -KJ      Relax 10  -KJ        User Key  (r) = Recorded By, (t) = Taken By, (c) = Cosigned By    Initials Name Provider Type    GARRY Gayle, PT Physical Therapist                Exercises       04/17/17 0900          Subjective Comments    Subjective Comments I have to say I think it's getting better. I was busy all day with [place] and I didn't even notice it until I started doing my exercies and afterward, and not severe. I think this is helping.   -KJ      Subjective Pain    Pre-Treatment Pain Level 1  -KJ        User Key  (r) = Recorded By, (t) = Taken By, (c) = Cosigned By    Initials Name Provider Type    GARRY Gayle, PT Physical Therapist                        Manual Rx (last 36 hours)      Manual Treatments       04/17/17 0900          Manual Rx 1    Manual Rx 1 Type Intramuscular manual therapy with DDN.  After reviewing all risks (including pneumothorax, bruising, infection, nerve injury, and soreness) written informed consent for dry needling was obtained.   Patient position during treatment: R side lying with pillow between knees     Muscles treated: L UT with pincer grasp    Response: several LTRs noted, minimal pain response.     Clean needle technique observed at all times, precautions for lung fields, neurovascular structures observed.     Manual palpation and assessment performed before, during, and after session.    Written after-care instructions issued.     -KJ        User Key  (r) = Recorded By, (t) = Taken By, (c) = Cosigned By    Initials Name Provider Type    GARRY  Megan Gayle, PT Physical Therapist                PT OP Goals       04/17/17 1000       PT Short Term Goals    STG Date to Achieve 04/14/17  -KJ     STG 1 Pt. will be independent and compliant with initial home exercise program.   -KJ     STG 1 Progress Met  -KJ     STG 2 Pt. will report L shoulder/neck pain </=4-5/10 to increase ease of reading.   -KJ     STG 2 Progress Progressing  -KJ     STG 2 Progress Comments Reporting pain 1/10 the past few days, will continue to monitor.   -KJ     STG 3 Pt. will report compliance with postural awarenes and positioning for optimal posure.   -KJ     STG 3 Progress Progressing  -KJ     STG 3 Progress Comments Continuing to educate and discuss.   -KJ     Long Term Goals    LTG Date to Achieve 04/30/17  -KJ     LTG 1 Pt. will be independent and compliant with advanced home exercise program.   -KJ     LTG 1 Progress Progressing  -KJ     LTG 1 Progress Comments Continuing to progress as needed.   -KJ     LTG 2 Pt. will report L neck/shoulder pain </= 2-3/10 to increase ease of faling asleep.   -KJ     LTG 2 Progress Progressing  -KJ     LTG 2 Progress Comments Reporting pain 1/10 the past few days, will continue to monitor.   -KJ       User Key  (r) = Recorded By, (t) = Taken By, (c) = Cosigned By    Initials Name Provider Type    GARRY Gayle, PT Physical Therapist                Therapy Education       04/17/17 1015          Therapy Education    Given HEP;Symptoms/condition management;Pain management;Posture/body mechanics   Possible causes of symptoms, goals of needling  -KJ      Program Progressed  -KJ      How Provided Verbal;Demonstration  -KJ      Provided to Patient  -KJ      Level of Understanding Teach back education performed  -KJ        User Key  (r) = Recorded By, (t) = Taken By, (c) = Cosigned By    Initials Name Provider Type    GARRY Gayle, PT Physical Therapist                Time Calculation:   Start Time: 0914  Stop Time: 1006  Time  Calculation (min): 52 min    Therapy Charges for Today     Code Description Service Date Service Provider Modifiers Qty    61363583494 HC PT HOT OR COLD PACK TREAT MCARE 4/17/2017 Megan Gayle, PT GP 1    72270392998 HC PT TRACTION CERVICAL 4/17/2017 Megan Gayle, PT 58, GP 1    87673530730 HC PT MANUAL THERAPY EA 15 MIN 4/17/2017 Megan Gayle, PT 58, GP 2                    Megan Gayle, PT  4/17/2017

## 2017-04-20 ENCOUNTER — HOSPITAL ENCOUNTER (OUTPATIENT)
Dept: PHYSICAL THERAPY | Facility: HOSPITAL | Age: 67
Setting detail: THERAPIES SERIES
Discharge: HOME | End: 2017-04-20

## 2017-04-20 DIAGNOSIS — M25.512 ACUTE PAIN OF LEFT SHOULDER: Primary | ICD-10-CM

## 2017-04-20 PROCEDURE — 97110 THERAPEUTIC EXERCISES: CPT | Performed by: PHYSICAL THERAPIST

## 2017-04-20 PROCEDURE — 97012 MECHANICAL TRACTION THERAPY: CPT | Performed by: PHYSICAL THERAPIST

## 2017-04-21 NOTE — PROGRESS NOTES
Outpatient Physical Therapy Ortho Treatment Note  [facility]     Patient Name: Ashley Comer  : 1950  MRN: [id number]  Today's Date: 2017      Visit Date: 2017    Visit Dx:    ICD-10-CM ICD-9-CM   1. 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        Past Surgical History:   Procedure Laterality Date   • CATARACT EXTRACTION     • FOOT SURGERY     • LAPAROSCOPIC APPENDECTOMY     • SMALL INTESTINE SURGERY  2016    for GIST    • THYROID SURGERY      thyroid lobectomy    • VITRECTOMY Right 2015                             PT Assessment/Plan       17 1507       PT Assessment    Assessment Comments Pt. with increased soreness today likely due to needling just two days ago and pt. mowing lawn. WIll assess next session if needlng was beneficial.   -KJ     PT Plan    PT Plan Comments Continue needling if helpful, continue traction as needed, progress HEP.   -KJ       User Key  (r) = Recorded By, (t) = Taken By, (c) = Cosigned By    Initials Name Provider Type    GARRY Gayle, PT Physical Therapist                Modalities       17 1600          Ice    Location L shoulder in sitting following exercises.   -KJ      Rx Minutes 10 mins  -KJ      Traction 64302    Traction Type Cervical  -KJ      Rx Minutes 15  -KJ      Position Hook-lying  -KJ      Weight 16   /8  -KJ      Hold 45   -KJ      Relax 10  -KJ        User Key  (r) = Recorded By, (t) = Taken By, (c) = Cosigned By    Initials Name Provider Type    GARRY Gayle PT Physical Therapist                Exercises       04/20/17 1600          Subjective Comments    Subjective Comments I was really tired after the needling and ok Tuesday but wanted to mow my own lawn and it hurt a lot after that. 5/10 today and yesterday at times. I think the lumbar roll and propping myself better is helping with reading at night.   -KJ      Subjective Pain    Pre-Treatment Pain Level 0  -KJ      Exercise 1    Exercise Name 1 Reverse shoulder rolls  -KJ      Weights/Plates 1 3  -KJ      Reps 1 15  -KJ      Exercise 2    Exercise Name 2 Rows  -KJ      Cueing 2 Demo  -KJ      Resistance 2 Red  -KJ      Reps 2 15  -KJ      Exercise 3    Exercise Name 3 Chin tucks  -KJ      Reps 3 15  -KJ      Exercise 4    Exercise Name 4 UBE  -KJ      Weights/Plates 4 1  -KJ      Time (Minutes) 4 5  -KJ      Exercise 5    Exercise Name 5 Shoulder extension  -KJ      Cueing 5 Demo  -KJ      Resistance 5 Red  -KJ      Reps 5 15  -KJ      Exercise 6    Exercise Name 6 Pec stretch - doorway  -KJ      Cueing 6 Verbal  -KJ      Reps 6 3  -KJ      Time (Seconds) 6 20  -KJ      Exercise 7    Exercise Name 7 B shoulder ER  -KJ      Resistance 7 Red  -KJ      Reps 7 15  -KJ      Exercise 8    Exercise Name 8 Foam roller-  double UE flexion  -KJ      Cueing 8 Verbal  -KJ      Reps 8 10   ea  -KJ      Exercise 10    Exercise Name 10 Scapular retraction over foam roller.   -KJ      Reps 10 10  -KJ        User Key  (r) = Recorded By, (t) = Taken By, (c) = Cosigned By    Initials Name Provider Type    GARRY Gayle, SIMBA Physical Therapist                                   Therapy Education       04/20/17 1707          Therapy Education    Given HEP;Symptoms/condition management;Pain management;Posture/body mechanics   Possible causes of pain, continued soreness from needling or  mowing lawn.   -KJ      Program Progressed  -KJ      How Provided Verbal;Demonstration  -KJ      Provided to Patient  -KJ      Level of Understanding Teach back education performed  -KJ        User Key  (r) = Recorded By, (t) = Taken By, (c) = Cosigned By    Initials Name Provider Type    GARRY Gayle, PT Physical Therapist                Time Calculation:   Start Time: 1630  Stop Time: 1715  Time Calculation (min): 45 min    Therapy Charges for Today     Code Description Service Date Service Provider Modifiers Qty    42964918033 HC PT HOT OR COLD PACK TREAT MCARE 4/20/2017 Megan Gayle, PT GP 1    27082965357 HC PT TRACTION CERVICAL 4/20/2017 Megan Gayle, PT GP 1    06901243485 HC PT THER PROC EA 15 MIN 4/20/2017 Megan Gayle, PT GP 2                    Megan Gayle, PT  4/21/2017

## 2017-04-24 ENCOUNTER — HOSPITAL ENCOUNTER (OUTPATIENT)
Dept: PHYSICAL THERAPY | Facility: HOSPITAL | Age: 67
Setting detail: THERAPIES SERIES
Discharge: HOME | End: 2017-04-24

## 2017-04-24 DIAGNOSIS — M25.512 ACUTE PAIN OF LEFT SHOULDER: Primary | ICD-10-CM

## 2017-04-24 PROCEDURE — 97140 MANUAL THERAPY 1/> REGIONS: CPT | Performed by: PHYSICAL THERAPIST

## 2017-04-24 PROCEDURE — 97012 MECHANICAL TRACTION THERAPY: CPT | Performed by: PHYSICAL THERAPIST

## 2017-04-24 PROCEDURE — 97110 THERAPEUTIC EXERCISES: CPT | Performed by: PHYSICAL THERAPIST

## 2017-04-24 NOTE — PROGRESS NOTES
Outpatient Physical Therapy Ortho Treatment Note  [facility]     Patient Name: Ashley Comer  : 1950  MRN: [id number]  Today's Date: 2017      Visit Date: 2017    Visit Dx:    ICD-10-CM ICD-9-CM   1. 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        Past Surgical History:   Procedure Laterality Date   • CATARACT EXTRACTION     • FOOT SURGERY     • LAPAROSCOPIC APPENDECTOMY     • SMALL INTESTINE SURGERY  2016    for GIST    • THYROID SURGERY      thyroid lobectomy    • VITRECTOMY Right 2015                             PT Assessment/Plan       17 1526       PT Assessment    Assessment Comments Pt. reporting significant overall improvement in symptoms, can read without pain and reports pain 1-2/10 at worst. Continued needling one more session today to assess response. Likely ready for d/d to HEP in 1-2 sessions.   -KJ     PT Plan    PT Plan Comments Likely D/C to HEP in 1-2 sessions.   -KJ       User Key  (r) = Recorded By, (t) = Taken By, (c) = Cosigned By    Initials Name Provider Type    GARRY Gayle, PT Physical Therapist                Modalities       17 0900          Moist Heat    Location cervical spine with mechanical traction  -KJ      Rx Minutes 12 mins  -KJ      Ice    Location L UT area after needling  -KJ      Rx Minutes 10 mins  -KJ      Traction 94113     Traction Type Cervical  -KJ      Rx Minutes 15  -KJ      Position Hook-lying  -KJ      Weight 17   /8  -KJ      Hold 45  -KJ      Relax 10  -KJ        User Key  (r) = Recorded By, (t) = Taken By, (c) = Cosigned By    Initials Name Provider Type    GARRY Gayle, PT Physical Therapist                Exercises       04/24/17 0900          Subjective Comments    Subjective Comments Not pain free like it was that day but better, 1-2/10. Not as severe when I first started, I can read now. I only get that every now and then.   -KJ      Subjective Pain    Pre-Treatment Pain Level 0  -KJ      Exercise 1    Exercise Name 1 Reverse shoulder rolls  -KJ      Weights/Plates 1 3  -KJ      Reps 1 15  -KJ      Exercise 2    Exercise Name 2 Rows  -KJ      Resistance 2 Red  -KJ      Reps 2 15  -KJ      Exercise 3    Exercise Name 3 Chin tucks  -KJ      Reps 3 15  -KJ      Exercise 4    Exercise Name 4 UBE  -KJ      Weights/Plates 4 1  -KJ      Time (Minutes) 4 5  -KJ      Exercise 5    Exercise Name 5 Shoulder extension  -KJ      Cueing 5 Demo  -KJ      Resistance 5 Red  -KJ      Reps 5 15  -KJ      Exercise 6    Reps 6 --  -KJ      Exercise 8    Cueing 8 --  -KJ        User Key  (r) = Recorded By, (t) = Taken By, (c) = Cosigned By    Initials Name Provider Type    GARRY Gayle, PT Physical Therapist                        Manual Rx (last 36 hours)      Manual Treatments       04/24/17 0900          Manual Rx 1    Manual Rx 1 Type Intramuscular manual therapy with DDN.  Patient position during treatment: Prone     Muscles treated: L UT with pincer grasp    Response: several LTRs noted, minimal to no pain response.     Clean needle technique observed at all times, precautions for lung fields, neurovascular structures observed.     Manual palpation and assessment performed before, during, and after session.    -KJ      Manual Rx 1 Duration 12  -KJ        User Key  (r) = Recorded By, (t) = Taken By, (c) = Cosigned By     Initials Name Provider Type    GARRY Gayle PT Physical Therapist                PT OP Goals       04/24/17 1500       PT Short Term Goals    STG Date to Achieve 04/14/17  -KJ     STG 1 Pt. will be independent and compliant with initial home exercise program.   -KJ     STG 1 Progress Met  -KJ     STG 2 Pt. will report L shoulder/neck pain </=4-5/10 to increase ease of reading.   -KJ     STG 2 Progress Met  -KJ     STG 2 Progress Comments Reporting pain 1-2/10 at worst.   -KJ     STG 3 Pt. will report compliance with postural awarenes and positioning for optimal posure.   -KJ     STG 3 Progress Progressing  -KJ     STG 3 Progress Comments Continuing to educate and discuss.   -KJ     Long Term Goals    LTG Date to Achieve 04/30/17  -KJ     LTG 1 Pt. will be independent and compliant with advanced home exercise program.   -KJ     LTG 1 Progress Progressing  -KJ     LTG 1 Progress Comments Continuing to progress as needed, pretty well advanced.   -KJ     LTG 2 Pt. will report L neck/shoulder pain </= 2-3/10 to increase ease of faling asleep.   -KJ     LTG 2 Progress Progressing  -KJ     LTG 2 Progress Comments Reporting pain 1-2/10 at worst, will continue to moitor.   -KJ       User Key  (r) = Recorded By, (t) = Taken By, (c) = Cosigned By    Initials Name Provider Type    GARRY Gayle, PT Physical Therapist                Therapy Education       04/24/17 1523          Therapy Education    Given HEP;Symptoms/condition management;Pain management;Posture/body mechanics   Goals of therapy, possibly d/c soon, realistic expectations with therapy and likely underlying spine issues.   -KJ      Program Progressed  -KJ      How Provided Verbal;Demonstration  -KJ      Provided to Patient  -KJ      Level of Understanding Teach back education performed  -KJ        User Key  (r) = Recorded By, (t) = Taken By, (c) = Cosigned By    Initials Name Provider Type    GARRY Gayle, PT Physical Therapist                 Time Calculation:   Start Time: 0922  Stop Time: 1015  Time Calculation (min): 53 min    Therapy Charges for Today     Code Description Service Date Service Provider Modifiers Qty    27988020920 HC PT HOT OR COLD PACK TREAT MCARE 4/24/2017 Megan Gayle, PT GP 1    34867746400 HC PT TRACTION CERVICAL 4/24/2017 Megan Gayle, PT 58, GP 1    00331366305 HC PT THER PROC EA 15 MIN 4/24/2017 Megan Gayle, PT GP 1    81967508677 HC PT MANUAL THERAPY EA 15 MIN 4/24/2017 Megan Gayle, PT 58, GP 1                    Megan Gayle, PT  4/24/2017

## 2017-04-27 ENCOUNTER — HOSPITAL ENCOUNTER (OUTPATIENT)
Dept: PHYSICAL THERAPY | Facility: HOSPITAL | Age: 67
Setting detail: THERAPIES SERIES
Discharge: HOME | End: 2017-04-27

## 2017-04-27 DIAGNOSIS — M25.512 ACUTE PAIN OF LEFT SHOULDER: Primary | ICD-10-CM

## 2017-04-27 PROCEDURE — 97012 MECHANICAL TRACTION THERAPY: CPT | Performed by: PHYSICAL THERAPIST

## 2017-04-27 PROCEDURE — 97110 THERAPEUTIC EXERCISES: CPT | Performed by: PHYSICAL THERAPIST

## 2017-04-28 NOTE — PROGRESS NOTES
Outpatient Physical Therapy Ortho Treatment Note  [facility]     Patient Name: Ashley Comer  : 1950  MRN: [id number]  Today's Date: 2017      Visit Date: 2017    Visit Dx:    ICD-10-CM ICD-9-CM   1. 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        Past Surgical History:   Procedure Laterality Date   • CATARACT EXTRACTION     • FOOT SURGERY     • LAPAROSCOPIC APPENDECTOMY     • SMALL INTESTINE SURGERY  2016    for GIST    • THYROID SURGERY      thyroid lobectomy    • VITRECTOMY Right 17 1400   Subjective Comments   Subjective Comments 4/10 when I feel it but it only lasts 10 minutes. I can move and get out of that position. It's just annoying when it's there.    Subjective Pain   Pre-Treatment Pain Level 0   Exercise 1   Exercise Name 1 Reverse shoulder rolls   Weights/Plates 1 3   Reps 1 15   Exercise 2   Exercise Name 2 Rows   Resistance 2 Green   Reps 2 15   Exercise 3   Exercise Name 3 Chin tucks   Reps 3 15   Exercise 4   Exercise Name 4 UBE   Weights/Plates 4 1   Time (Minutes) 4 5   Exercise 5   Exercise Name 5 Shoulder extension   Cueing 5 Demo   Resistance 5 Green   Reps 5 15   Exercise 6   Exercise Name 6 Pec stretch - doorway   Cueing 6 Verbal   Reps 6 3   Time (Seconds) 6 20   Exercise 7   Exercise Name 7 B shoulder ER   Resistance 7 Red   Reps 7 15    Exercise 8   Exercise Name 8 Towell roll-  double UE flexion   Cueing 8 Verbal   Reps 8 10   Exercise 10   Exercise Name 10 Scapular retraction over towel roll.    Reps 10 10                   PT Assessment/Plan       04/27/17 1624       PT Assessment    Assessment Comments Pt. likely ready for d/c as she appears to have reached a plateau in her therapy, now reporting decreased pain severity and incidents (4/10 at worst and relieved with correcting poor posture). Pt. may benefit from imaging for further treatment plan or continuing strengthening longer to assess response.   -KJ     PT Plan    PT Plan Comments See once more or d/c to HEP. Recommend imaging if pt. still unsatisfied.  -KJ       User Key  (r) = Recorded By, (t) = Taken By, (c) = Cosigned By    Initials Name Provider Type    GARRY Gayle, PT Physical Therapist                                       PT OP Goals       04/27/17 1626       PT Short Term Goals    STG Date to Achieve 04/14/17  -KJ     STG 1 Pt. will be independent and compliant with initial home exercise program.   -KJ     STG 1 Progress Met  -KJ     STG 2 Pt. will report L shoulder/neck pain </=4-5/10 to increase ease of reading.   -KJ     STG 2 Progress Met  -KJ     STG 3 Pt. will report compliance with postural awarenes and positioning for optimal posure.   -KJ     STG 3 Progress Partially Met  -KJ     STG 3 Progress Comments Requiring some cuing.   -KJ     Long Term Goals    LTG Date to Achieve 04/30/17  -KJ     LTG 1 Pt. will be independent and compliant with advanced home exercise program.   -KJ     LTG 1 Progress Met  -KJ     LTG 2 Pt. will report L neck/shoulder pain </= 2-3/10 to increase ease of faling asleep.   -KJ     LTG 2 Progress Partially Met  -KJ     LTG 2 Progress Comments Reporting pain 4/10 at worst, intermittent.   -KJ       User Key  (r) = Recorded By, (t) = Taken By, (c) = Cosigned By    Initials Name Provider Type    GARRY Gayle, PT Physical Therapist                 Therapy Education       04/27/17 1624          Therapy Education    Given HEP;Symptoms/condition management;Pain management;Posture/body mechanics  -KJ      Program Progressed  -KJ      How Provided Verbal;Demonstration  -KJ      Provided to Patient  -KJ      Level of Understanding Teach back education performed  -KJ        User Key  (r) = Recorded By, (t) = Taken By, (c) = Cosigned By    Initials Name Provider Type    GARRY Gayle, PT Physical Therapist                Time Calculation:   Start Time: 1445  Stop Time: 1530  Time Calculation (min): 45 min    Therapy Charges for Today     Code Description Service Date Service Provider Modifiers Qty    54361142027  PT HOT OR COLD PACK TREAT MCARE 4/27/2017 Megan Gayle, PT GP 1    34295173307 HC PT TRACTION CERVICAL 4/27/2017 Megan Gayle, PT GP 1    08490501627 HC PT THER PROC EA 15 MIN 4/27/2017 Megan Gayle, PT GP 2                    Megan Gayle, PT  4/28/2017

## 2017-05-01 ENCOUNTER — APPOINTMENT (OUTPATIENT)
Dept: PHYSICAL THERAPY | Facility: HOSPITAL | Age: 67
End: 2017-05-01

## 2017-05-03 ENCOUNTER — HOSPITAL ENCOUNTER (OUTPATIENT)
Dept: PHYSICAL THERAPY | Facility: HOSPITAL | Age: 67
Setting detail: THERAPIES SERIES
Discharge: HOME | End: 2017-05-03

## 2017-05-03 DIAGNOSIS — M25.512 ACUTE PAIN OF LEFT SHOULDER: Primary | ICD-10-CM

## 2017-05-03 PROCEDURE — 97140 MANUAL THERAPY 1/> REGIONS: CPT | Performed by: PHYSICAL THERAPIST

## 2017-05-03 PROCEDURE — 97012 MECHANICAL TRACTION THERAPY: CPT | Performed by: PHYSICAL THERAPIST

## 2017-06-09 ENCOUNTER — TELEPHONE (OUTPATIENT)
Dept: INTERNAL MEDICINE | Facility: CLINIC | Age: 67
End: 2017-06-09

## 2017-06-09 DIAGNOSIS — M25.512 ACUTE PAIN OF LEFT SHOULDER: Primary | ICD-10-CM

## 2017-06-09 DIAGNOSIS — M54.2 NECK PAIN: ICD-10-CM

## 2017-06-09 NOTE — TELEPHONE ENCOUNTER
Pt called about her neck pain, stated that she has through over a month of PT and they recommended since the pain has barely gone away that additional imaging should be done.     Pt would like to have orders placed for imaging for a further workup.

## 2017-06-12 PROBLEM — M54.2 NECK PAIN: Status: ACTIVE | Noted: 2017-06-12

## 2017-06-12 NOTE — TELEPHONE ENCOUNTER
I will order xray of her neck and shoulder to be done over at the [facility] radiology dept.

## 2017-06-14 ENCOUNTER — HOSPITAL ENCOUNTER (OUTPATIENT)
Dept: GENERAL RADIOLOGY | Facility: HOSPITAL | Age: 67
Discharge: HOME | End: 2017-06-14

## 2017-06-14 ENCOUNTER — HOSPITAL ENCOUNTER (OUTPATIENT)
Dept: GENERAL RADIOLOGY | Facility: HOSPITAL | Age: 67
Discharge: HOME | End: 2017-06-14
Admitting: INTERNAL MEDICINE

## 2017-06-14 ENCOUNTER — OFFICE VISIT (OUTPATIENT)
Dept: SURGERY | Facility: CLINIC | Age: 67
End: 2017-06-14

## 2017-06-14 VITALS
HEIGHT: 63 IN | SYSTOLIC BLOOD PRESSURE: 128 MMHG | HEART RATE: 57 BPM | RESPIRATION RATE: 20 BRPM | BODY MASS INDEX: 26.26 KG/M2 | WEIGHT: 148.2 LBS | DIASTOLIC BLOOD PRESSURE: 82 MMHG | OXYGEN SATURATION: 98 %

## 2017-06-14 DIAGNOSIS — M25.512 ACUTE PAIN OF LEFT SHOULDER: ICD-10-CM

## 2017-06-14 DIAGNOSIS — M54.2 NECK PAIN: ICD-10-CM

## 2017-06-14 DIAGNOSIS — C49.A3 GIST (GASTROINTESTINAL STROMAL TUMOR) OF SMALL BOWEL, MALIGNANT (HCC): Primary | ICD-10-CM

## 2017-06-14 PROCEDURE — 73030 X-RAY EXAM OF SHOULDER: CPT

## 2017-06-14 PROCEDURE — 72050 X-RAY EXAM NECK SPINE 4/5VWS: CPT

## 2017-06-14 PROCEDURE — 99213 OFFICE O/P EST LOW 20 MIN: CPT | Performed by: SURGERY

## 2017-06-14 NOTE — PROGRESS NOTES
Chief complaint:  Post-op  Follow up    History of Present Illness    This is Ashley Comer 66 y.o. status post *** and is doing very well.  Patient denies fever, chills, nausea or vomiting.  Patient's pain is well-controlled.      The following portions of the patient's history were reviewed and updated as appropriate: allergies, current medications, past family history, past medical history, past social history, past surgical history and problem list.    Physical Exam  Incision is well-healed without evidence of {No wound infection:74839}.    Patient does not use tobacco products currently and I have counseled the patient not to start using tobacco products in the future.    Assessment/plan:    This is Ashley Comer 66 y.o. status post *** and is doing very well.  I have instructed the patient not lift greater than 10 pounds for total of 6 weeks from the time of surgery. I have instructed the patient follow-up as needed.    Mini Meraz MD  General, Minimally Invasive and Endoscopic Surgery  [facility]    [address], [address]  [place], [place], [address]  P: [phone]  F: [phone]    Cc:  Gwen Patterson MD

## 2017-06-15 ENCOUNTER — TELEPHONE (OUTPATIENT)
Dept: INTERNAL MEDICINE | Facility: CLINIC | Age: 67
End: 2017-06-15

## 2017-06-15 DIAGNOSIS — M54.2 NECK PAIN: Primary | ICD-10-CM

## 2017-06-15 DIAGNOSIS — M50.30 DEGENERATIVE DISC DISEASE, CERVICAL: ICD-10-CM

## 2017-06-15 NOTE — TELEPHONE ENCOUNTER
----- Message from Gwen Patterson MD sent at 6/15/2017  8:43 AM EDT -----  Please call - her c-spine xray shows severe degenerative changes.  Radiology actually recommended neurosurgical evaluation.  I would like to put a referral in.  She may need an MRI prior.  Would she be willing to have this done?

## 2017-06-23 ENCOUNTER — HOSPITAL ENCOUNTER (OUTPATIENT)
Dept: MRI IMAGING | Facility: HOSPITAL | Age: 67
Discharge: HOME | End: 2017-06-23
Admitting: INTERNAL MEDICINE

## 2017-06-23 DIAGNOSIS — M50.30 DEGENERATIVE DISC DISEASE, CERVICAL: ICD-10-CM

## 2017-06-23 DIAGNOSIS — M54.2 NECK PAIN: ICD-10-CM

## 2017-06-23 PROCEDURE — 72156 MRI NECK SPINE W/O & W/DYE: CPT

## 2017-06-23 PROCEDURE — A9577 INJ MULTIHANCE: HCPCS | Performed by: INTERNAL MEDICINE

## 2017-06-23 PROCEDURE — 0 GADOBENATE DIMEGLUMINE 529 MG/ML SOLUTION: Performed by: INTERNAL MEDICINE

## 2017-06-23 RX ADMIN — GADOBENATE DIMEGLUMINE 13 ML: 529 INJECTION, SOLUTION INTRAVENOUS at 10:30

## 2017-06-27 DIAGNOSIS — E04.2 MULTIPLE THYROID NODULES: Primary | ICD-10-CM

## 2017-06-28 ENCOUNTER — TELEPHONE (OUTPATIENT)
Dept: INTERNAL MEDICINE | Facility: CLINIC | Age: 67
End: 2017-06-28

## 2017-06-28 NOTE — PROGRESS NOTES
Chief complaint: Patient is a personal history of a GI ST small bowel     Patient is a 66 y.o. female is a personal history of small bowel GI ST.  Patient has a family history of multiple other types of cancers including brain, lung, breast, teratoma and prostate cancer.  Patient denies any family history of ulcerative colitis, Crohn's disease, familial polyposis or colon cancer.  Patient denies melena currently.  Patient denies any fever, chills, nausea or vomiting.  Patient denies loss of weight or night sweats.  Past Medical History:   Diagnosis Date   • Anemia    • Blood tests for routine general physical examination    • Edema    • Esophageal reflux    • IFG (impaired fasting glucose)    • Migraine    • 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     Past Surgical History:   Procedure Laterality Date   • CATARACT EXTRACTION     • FOOT SURGERY     • LAPAROSCOPIC APPENDECTOMY     • SMALL INTESTINE SURGERY  02/2016    for GIST    • THYROID SURGERY      thyroid lobectomy    • VITRECTOMY Right 11/2015     Family History   Problem Relation Age of Onset   • Dementia Mother    • Hypertension Mother    • Tuberculosis Mother    • Prostate cancer Father    • Tuberculosis Father    • Cancer Sister      teratoma, malignant    • Breast cancer Maternal Aunt    • Lung cancer Paternal Aunt    • Brain cancer Paternal Aunt    • Breast cancer Paternal Aunt    • Breast cancer Sister      Social History   Substance Use Topics   • Smoking status: Former Smoker     Packs/day: 1.00     Years: 25.00   • Smokeless tobacco: Never Used      Comment: quit in 1992   • Alcohol use Yes      Comment: social use         Current Outpatient Prescriptions:   •  B Complex-C (SUPER B COMPLEX PO), Take 1 tablet by mouth daily. As directed, Disp: , Rfl:   •  calcium citrate-vitamin d (CITRACAL) 200-250 MG-UNIT tablet tablet, Take  by mouth 2 (two) times a day., Disp: ,  "Rfl:   •  Cholecalciferol (VITAMIN D3) 1000 UNITS capsule, Take  by mouth. Take as directed, Disp: , Rfl:   •  Coenzyme Q10 (CO Q 10 PO), Take 100 mg by mouth daily., Disp: , Rfl:   •  glucosamine-chondroitin 500-400 MG capsule capsule, Take  by mouth 3 (three) times a day with meals., Disp: , Rfl:   •  HYALURONIC ACID-VITAMIN C PO, Take  by mouth., Disp: , Rfl:   •  Misc Natural Products (OSTEO BI-FLEX TRIPLE STRENGTH PO), Take 2 tablets by mouth., Disp: , Rfl:   •  Multiple Vitamins-Minerals (MULTI FOR HER) tablet, Take 1 tablet by mouth daily., Disp: , Rfl:   •  omeprazole (priLOSEC) 40 MG capsule, TAKE ONE CAPSULE BY MOUTH DAILY, Disp: 30 capsule, Rfl: 3  •  Psyllium 500 MG capsule, Take  by mouth. Take as directed, Disp: , Rfl:   •  rizatriptan (MAXALT) 10 MG tablet, Take  by mouth. Take 1 tablet at onset of headache; may repeat 2 hours later for x 1 dose; max of 2/day, Disp: , Rfl:     Review of Systems   General: Patient reports good health  Eyes: No eye problems  Ears, nose, mouth and throat: No rhinitis, no hearing problems, no chronic cough  Cardiovascular/heart: Denies palpitations, syncope or chest pain  Respiratory/lung: Denies shortness of breath, hemoptysis, or dyspnea on exertion   Genital/urinary: No frequency, hematuria or dysuria  Hematological/lymphatic: Denies anemia or other problems  Musculoskeletal: No joint pain, no defects  Skin: No psoriasis or other skin issues  Neurological: No seizures or other neurological problems  Psychiatric: None  Endocrine: Negative  Gastro-intestinal: No constipation, no diarrhea, no melena, no hematochezia  All other systems have been reviewed and are negative.  Vitals:    06/14/17 1217   BP: 128/82   BP Location: Left arm   Patient Position: Sitting   Cuff Size: Adult   Pulse: 57   Resp: 20   SpO2: 98%   Weight: 148 lb 3.2 oz (67.2 kg)   Height: 63\" (160 cm)       Physical Exam  General/physcological:   Alert and oriented x3 in no acute distress  HEENT: Normal " cephalic, atraumatic, PERRLA, EOMI, sclera anicteric, moist mucous membranes, neck is supple, no JVD, no carotid bruits, no thyromegaly no adenopathy  Respiratory: CTA and percussion  CVA: RRR, normal S1-S2, no murmurs, no gallops or rubs  GI: Positive BS, soft, nondistended, nontender, no rebound, no guarding, no hernias, no organomegaly and no masses  Musculoskeletal: Full range of motion, no clubbing, no cyanosis or edema  Neurovascular: Grossly intact    Patient does not use tobacco products currently and I have counseled the patient to not start using tobacco products in the future.    Assessment:  GI ST  Plan:  Patient is needing some surveillance endoscopy.  I have recommended that she undergo EGD as well as a colonoscopy.  I have discussed these 2 procedures in detail with the patient.  I have discussed the risks, benefits and alternatives.  I have discussed the risk of anesthesia, bleeding and perforation.  Patient understands these risk, benefits and alternatives and wishes to proceed.  I have her scheduled at her earliest convenience.    Mini Meraz MD  General, Minimally Invasive and Endoscopic Surgery  [facility]    [address], [address]  [place], [place], [address]  P: [phone]  F: [phone]    Cc:  Gwen Patterson MD

## 2017-06-28 NOTE — TELEPHONE ENCOUNTER
I called and discussed her MRI results.  She has an appt with Dr. Jimenez's office scheduled.  She is ok to proceed with thyroid work up as well.  She is out of town until July 7th.

## 2017-06-28 NOTE — TELEPHONE ENCOUNTER
Pt is returning the missed call from , stated that she would have her mobile phone on her all day long.

## 2017-06-29 ENCOUNTER — RESULTS ENCOUNTER (OUTPATIENT)
Dept: INTERNAL MEDICINE | Facility: CLINIC | Age: 67
End: 2017-06-29

## 2017-06-29 DIAGNOSIS — E04.2 MULTIPLE THYROID NODULES: ICD-10-CM

## 2017-07-18 RX ORDER — OMEPRAZOLE 40 MG/1
CAPSULE, DELAYED RELEASE ORAL
Qty: 30 CAPSULE | Refills: 2 | Status: SHIPPED | OUTPATIENT
Start: 2017-07-18 | End: 2017-07-27

## 2017-07-20 LAB
T3 SERPL-MCNC: 101 NG/DL (ref 71–180)
T3FREE SERPL-MCNC: 3 PG/ML (ref 2–4.4)
T4 FREE SERPL-MCNC: 1.54 NG/DL (ref 0.93–1.7)
TSH SERPL DL<=0.005 MIU/L-ACNC: 1.26 MIU/ML (ref 0.27–4.2)

## 2017-07-21 ENCOUNTER — TELEPHONE (OUTPATIENT)
Dept: INTERNAL MEDICINE | Facility: CLINIC | Age: 67
End: 2017-07-21

## 2017-07-21 NOTE — TELEPHONE ENCOUNTER
Pt informed, states understanding.     States that she has to wait atleast until 8/10/2017, she had a MRI done a few weeks ago and they are making her wait 6 weeks between scans, due to contrast.     Pt stated she will get it scheduled and there has been a nodule present on her thyroid since may of 2016, she is not too worried that there is anything changing or new.

## 2017-07-21 NOTE — TELEPHONE ENCOUNTER
----- Message from Gwen Patterson MD sent at 7/21/2017 12:11 AM EDT -----  Please call - her thyroid numbers are normal.  Has she had the u/s or uptake scan scheduled yet?  Please be sure these are being scheduled.  The orders are in.

## 2017-07-27 RX ORDER — OMEPRAZOLE 40 MG/1
40 CAPSULE, DELAYED RELEASE ORAL DAILY
COMMUNITY
End: 2018-03-28 | Stop reason: SDUPTHER

## 2017-07-28 ENCOUNTER — ANESTHESIA (OUTPATIENT)
Dept: GASTROENTEROLOGY | Facility: HOSPITAL | Age: 67
End: 2017-07-28

## 2017-07-28 ENCOUNTER — HOSPITAL ENCOUNTER (OUTPATIENT)
Facility: HOSPITAL | Age: 67
Setting detail: HOSPITAL OUTPATIENT SURGERY
Discharge: HOME | End: 2017-07-28
Attending: SURGERY | Admitting: SURGERY

## 2017-07-28 ENCOUNTER — ANESTHESIA EVENT (OUTPATIENT)
Dept: GASTROENTEROLOGY | Facility: HOSPITAL | Age: 67
End: 2017-07-28

## 2017-07-28 VITALS
TEMPERATURE: 98.2 F | WEIGHT: 149.13 LBS | HEIGHT: 63 IN | BODY MASS INDEX: 26.42 KG/M2 | SYSTOLIC BLOOD PRESSURE: 115 MMHG | HEART RATE: 65 BPM | RESPIRATION RATE: 16 BRPM | OXYGEN SATURATION: 99 % | DIASTOLIC BLOOD PRESSURE: 85 MMHG

## 2017-07-28 DIAGNOSIS — C49.A3 GIST (GASTROINTESTINAL STROMAL TUMOR) OF SMALL BOWEL, MALIGNANT (HCC): ICD-10-CM

## 2017-07-28 PROCEDURE — S0260 H&P FOR SURGERY: HCPCS | Performed by: SURGERY

## 2017-07-28 PROCEDURE — 88312 SPECIAL STAINS GROUP 1: CPT | Performed by: SURGERY

## 2017-07-28 PROCEDURE — 45380 COLONOSCOPY AND BIOPSY: CPT | Performed by: SURGERY

## 2017-07-28 PROCEDURE — 25010000002 PROPOFOL 10 MG/ML EMULSION: Performed by: NURSE ANESTHETIST, CERTIFIED REGISTERED

## 2017-07-28 PROCEDURE — 43239 EGD BIOPSY SINGLE/MULTIPLE: CPT | Performed by: SURGERY

## 2017-07-28 PROCEDURE — 88305 TISSUE EXAM BY PATHOLOGIST: CPT | Performed by: SURGERY

## 2017-07-28 RX ORDER — PROPOFOL 10 MG/ML
VIAL (ML) INTRAVENOUS CONTINUOUS PRN
Status: DISCONTINUED | OUTPATIENT
Start: 2017-07-28 | End: 2017-07-28 | Stop reason: SURG

## 2017-07-28 RX ORDER — NAPROXEN SODIUM 220 MG
220 TABLET ORAL 2 TIMES DAILY WITH MEALS
COMMUNITY
End: 2021-08-12

## 2017-07-28 RX ORDER — PROMETHAZINE HYDROCHLORIDE 25 MG/ML
12.5 INJECTION, SOLUTION INTRAMUSCULAR; INTRAVENOUS ONCE AS NEEDED
Status: DISCONTINUED | OUTPATIENT
Start: 2017-07-28 | End: 2017-07-28 | Stop reason: HOSPADM

## 2017-07-28 RX ORDER — PROMETHAZINE HYDROCHLORIDE 25 MG/1
25 TABLET ORAL ONCE AS NEEDED
Status: DISCONTINUED | OUTPATIENT
Start: 2017-07-28 | End: 2017-07-28 | Stop reason: HOSPADM

## 2017-07-28 RX ORDER — LIDOCAINE HYDROCHLORIDE 20 MG/ML
INJECTION, SOLUTION INFILTRATION; PERINEURAL AS NEEDED
Status: DISCONTINUED | OUTPATIENT
Start: 2017-07-28 | End: 2017-07-28 | Stop reason: SURG

## 2017-07-28 RX ORDER — PROPOFOL 10 MG/ML
VIAL (ML) INTRAVENOUS AS NEEDED
Status: DISCONTINUED | OUTPATIENT
Start: 2017-07-28 | End: 2017-07-28 | Stop reason: SURG

## 2017-07-28 RX ORDER — SODIUM CHLORIDE, SODIUM LACTATE, POTASSIUM CHLORIDE, CALCIUM CHLORIDE 600; 310; 30; 20 MG/100ML; MG/100ML; MG/100ML; MG/100ML
1000 INJECTION, SOLUTION INTRAVENOUS CONTINUOUS PRN
Status: DISCONTINUED | OUTPATIENT
Start: 2017-07-28 | End: 2017-07-28 | Stop reason: HOSPADM

## 2017-07-28 RX ORDER — PROMETHAZINE HYDROCHLORIDE 25 MG/1
25 SUPPOSITORY RECTAL ONCE AS NEEDED
Status: DISCONTINUED | OUTPATIENT
Start: 2017-07-28 | End: 2017-07-28 | Stop reason: HOSPADM

## 2017-07-28 RX ADMIN — SODIUM CHLORIDE, POTASSIUM CHLORIDE, SODIUM LACTATE AND CALCIUM CHLORIDE 1000 ML: 600; 310; 30; 20 INJECTION, SOLUTION INTRAVENOUS at 12:53

## 2017-07-28 RX ADMIN — PROPOFOL 200 MCG/KG/MIN: 10 INJECTION, EMULSION INTRAVENOUS at 13:01

## 2017-07-28 RX ADMIN — PROPOFOL 100 MG: 10 INJECTION, EMULSION INTRAVENOUS at 13:01

## 2017-07-28 RX ADMIN — LIDOCAINE HYDROCHLORIDE 100 MG: 20 INJECTION, SOLUTION INFILTRATION; PERINEURAL at 13:01

## 2017-07-28 NOTE — ANESTHESIA POSTPROCEDURE EVALUATION
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, malignant [C49.A3]) MD Cherie Noriega MD          Anesthesia Type: MAC  Last vitals  BP   115/85 (07/28/17 1354)    Temp        Pulse   65 (07/28/17 1354)   Resp   16 (07/28/17 1354)    SpO2   99 % (07/28/17 1354)      Post Anesthesia Care and Evaluation    Patient location during evaluation: PACU  Patient participation: complete - patient participated  Level of consciousness: awake  Pain score: 0  Pain management: adequate  Airway patency: patent  Anesthetic complications: No anesthetic complications    Cardiovascular status: acceptable  Respiratory status: acceptable  Hydration status: acceptable    Comments: Blood pressure 115/85, pulse 65, temperature 36.8 °C (98.2 °F), temperature source Oral, resp. rate 16, height 63\" (160 cm), weight 149 lb 2 oz (67.6 kg), SpO2 99 %.        "

## 2017-07-28 NOTE — ANESTHESIA PREPROCEDURE EVALUATION
Anesthesia Evaluation     Patient summary reviewed and Nursing notes reviewed   NPO Solid Status: > 8 hours  NPO Liquid Status: > 4 hours     Airway   Mallampati: I  TM distance: >3 FB  Neck ROM: full  no difficulty expected  Dental - normal exam     Pulmonary - normal exam   Cardiovascular - normal exam        Neuro/Psych  (+) headaches,    GI/Hepatic/Renal/Endo      Musculoskeletal     Abdominal  - normal exam    Bowel sounds: normal.   Substance History      OB/GYN          Other      history of cancer remission                                    Anesthesia Plan    ASA 2     MAC     Anesthetic plan and risks discussed with patient.

## 2017-07-31 LAB
CYTO UR: NORMAL
LAB AP CASE REPORT: NORMAL
Lab: NORMAL
PATH REPORT.FINAL DX SPEC: NORMAL
PATH REPORT.GROSS SPEC: NORMAL

## 2017-08-01 ENCOUNTER — DOCUMENTATION (OUTPATIENT)
Dept: PHYSICAL THERAPY | Facility: HOSPITAL | Age: 67
End: 2017-08-01

## 2017-08-01 DIAGNOSIS — M25.512 ACUTE PAIN OF LEFT SHOULDER: Primary | ICD-10-CM

## 2017-08-01 NOTE — THERAPY DISCHARGE NOTE
Outpatient Physical Therapy Discharge Summary         Patient Name: Ashley Comer  : 1950  MRN: [id number]    Today's Date: 2017    Visit Dx:    ICD-10-CM ICD-9-CM   1. Acute pain of left shoulder M25.512 719.41             PT OP Goals       17 1400       PT Short Term Goals    STG Date to Achieve 17  -KJ     STG 1 Pt. will be independent and compliant with initial home exercise program.   -KJ     STG 1 Progress Met  -KJ     STG 2 Pt. will report L shoulder/neck pain </=4-5/10 to increase ease of reading.   -KJ     STG 2 Progress Met  -KJ     STG 3 Pt. will report compliance with postural awarenes and positioning for optimal posure.   -KJ     STG 3 Progress Met  -KJ     Long Term Goals    LTG Date to Achieve 17  -KJ     LTG 1 Pt. will be independent and compliant with advanced home exercise program.   -KJ     LTG 1 Progress Met  -KJ     LTG 2 Pt. will report L neck/shoulder pain </= 2-3/10 to increase ease of faling asleep.   -KJ     LTG 2 Progress Met  -KJ       User Key  (r) = Recorded By, (t) = Taken By, (c) = Cosigned By    Initials Name Provider Type    GARRY Gayle, PT Physical Therapist          OP PT Discharge Summary  Date of Discharge: 17  Reason for Discharge: All goals achieved (Pt referred for MRI and Tony. )  Outcomes Achieved: Able to achieve all goals within established timeline  Discharge Destination: Home with home program      Time Calculation:                    Megan Gayle, PT  2017

## 2017-08-09 ENCOUNTER — HOSPITAL ENCOUNTER (OUTPATIENT)
Dept: NUCLEAR MEDICINE | Facility: HOSPITAL | Age: 67
Discharge: HOME | End: 2017-08-09

## 2017-08-09 ENCOUNTER — HOSPITAL ENCOUNTER (OUTPATIENT)
Dept: ULTRASOUND IMAGING | Facility: HOSPITAL | Age: 67
Discharge: HOME | End: 2017-08-09
Admitting: INTERNAL MEDICINE

## 2017-08-09 DIAGNOSIS — E04.2 MULTIPLE THYROID NODULES: ICD-10-CM

## 2017-08-09 PROCEDURE — 0 SODIUM IODIDE 3.7 MBQ CAPSULE: Performed by: INTERNAL MEDICINE

## 2017-08-09 PROCEDURE — A9516 IODINE I-123 SOD IODIDE MIC: HCPCS | Performed by: INTERNAL MEDICINE

## 2017-08-09 PROCEDURE — 76536 US EXAM OF HEAD AND NECK: CPT

## 2017-08-09 RX ORDER — SODIUM IODIDE I 123 100 UCI/1
1 CAPSULE, GELATIN COATED ORAL
Status: COMPLETED | OUTPATIENT
Start: 2017-08-09 | End: 2017-08-09

## 2017-08-09 RX ADMIN — Medication 1 CAPSULE: at 13:15

## 2017-08-10 ENCOUNTER — OFFICE VISIT (OUTPATIENT)
Dept: NEUROSURGERY | Facility: CLINIC | Age: 67
End: 2017-08-10

## 2017-08-10 ENCOUNTER — HOSPITAL ENCOUNTER (OUTPATIENT)
Dept: NUCLEAR MEDICINE | Facility: HOSPITAL | Age: 67
Discharge: HOME | End: 2017-08-10

## 2017-08-10 VITALS
HEIGHT: 63 IN | HEART RATE: 69 BPM | SYSTOLIC BLOOD PRESSURE: 139 MMHG | DIASTOLIC BLOOD PRESSURE: 82 MMHG | WEIGHT: 152 LBS | BODY MASS INDEX: 26.93 KG/M2

## 2017-08-10 DIAGNOSIS — M48.02 SPINAL STENOSIS IN CERVICAL REGION: ICD-10-CM

## 2017-08-10 DIAGNOSIS — M50.10 CERVICAL RADICULOPATHY DUE TO INTERVERTEBRAL DISC DISORDER: Primary | ICD-10-CM

## 2017-08-10 PROBLEM — M50.30 DEGENERATIVE DISC DISEASE, CERVICAL: Status: RESOLVED | Noted: 2017-06-15 | Resolved: 2017-08-10

## 2017-08-10 PROCEDURE — 99203 OFFICE O/P NEW LOW 30 MIN: CPT | Performed by: PHYSICIAN ASSISTANT

## 2017-08-10 PROCEDURE — 78014 THYROID IMAGING W/BLOOD FLOW: CPT

## 2017-08-10 NOTE — PROGRESS NOTES
Subjective   Patient ID: Ashley Comer is a 66 y.o. female is being seen for consultation today at the request of Gwen Patterson MD  For neck and left arm pain.  She denies any cause or injury. She completed PT with 70 % relief.  She states she went on vacation an has not started back on her HEP. She takes Aleve 220 mg PRN for pain.      Neck Pain    This is a new problem. The current episode started more than 1 month ago (7 months ). The problem occurs intermittently. The problem has been gradually improving (when doing PT, HEP ). The pain is associated with nothing. The pain is present in the left side. The quality of the pain is described as aching. The pain is at a severity of 3/10. The pain is mild. The symptoms are aggravated by position. The pain is same all the time. Pertinent negatives include no fever, headaches, numbness, tingling, visual change or weakness. Treatments tried: PT  The treatment provided moderate relief.   Arm Pain    There was no injury mechanism. The pain is present in the upper left arm. The patient is experiencing no pain. The pain has been intermittent since the incident. Pertinent negatives include no numbness or tingling. Treatments tried: PT  The treatment provided significant relief.       The following portions of the patient's history were reviewed and updated as appropriate: allergies, current medications, past family history, past medical history, past social history, past surgical history and problem list.    Review of Systems   Constitutional: Negative for fever.   Genitourinary: Negative for difficulty urinating.   Musculoskeletal: Positive for neck pain.   Neurological: Negative for tingling, weakness, numbness and headaches.   All other systems reviewed and are negative.      Objective   Physical Exam   Constitutional: She is oriented to person, place, and time. She appears well-developed and well-nourished.   HENT:   Head: Normocephalic and atraumatic.   Right Ear:  External ear normal.   Left Ear: External ear normal.   Eyes: Conjunctivae and EOM are normal. Pupils are equal, round, and reactive to light. Right eye exhibits no discharge. Left eye exhibits no discharge.   Neck: Normal range of motion. Neck supple. No tracheal deviation present.   Pulmonary/Chest: Effort normal. No stridor. No respiratory distress.   Musculoskeletal: Normal range of motion. She exhibits no edema, tenderness or deformity.   Neurological: She is alert and oriented to person, place, and time. She has normal strength and normal reflexes. She displays no atrophy, no tremor and normal reflexes. No cranial nerve deficit or sensory deficit. She exhibits normal muscle tone. She displays a negative Romberg sign. She displays no seizure activity. Coordination and gait normal.   No long tract signs   Skin: Skin is warm and dry.   Psychiatric: She has a normal mood and affect. Her behavior is normal. Judgment and thought content normal.   Nursing note and vitals reviewed.    Neurologic Exam     Mental Status   Oriented to person, place, and time.     Cranial Nerves     CN III, IV, VI   Pupils are equal, round, and reactive to light.  Extraocular motions are normal.     Motor Exam     Strength   Strength 5/5 throughout.       Assessment/Plan   Independent Review of Radiographic Studies:    I did review the C spine MRI with and without contrast from 6/23.  It does show multilevel DDD with a degenerative anterolisthesis at C4-5 and a degenerative retrolisthesis at C5-6 with moderate central stenosis and neural foraminal narrowing at both levels.   Medical Decision Making:    Ms. Comer was referred to us by Dr. Patterson for a 6 month history of left-sided neck pain that radiated into the left shoulder.  She will occasionally have numbness or tingling down the left arm.  Her PCP referred her to physical therapy which she completed in April and thankfully her symptoms have improved significantly.  At this point  she only has very mild intermittent discomfort along the left side of the neck but denies any radicular symptoms or weakness or incontinence or gait issues.  She rates her discomfort as a 3-4 on the VAS at its worst.  Leaning back against a hard chair tends to exacerbate her pain.    Did review the MRI with her.  At this point she really does not have any significant pain and is able to do all of her usual activities without difficulty.  She does not have any weakness.  She understands that if the pain worsens epidural injections could be considered.  A myelogram could also be ordered if all conservative management failed to help with her discomfort or if she began to experience weakness.  She will call as needed.    Ashley was seen today for neck pain and arm pain.    Diagnoses and all orders for this visit:    Cervical radiculopathy due to intervertebral disc disorder    Spinal stenosis in cervical region    Return if symptoms worsen or fail to improve.

## 2017-08-15 ENCOUNTER — TELEPHONE (OUTPATIENT)
Dept: INTERNAL MEDICINE | Facility: CLINIC | Age: 67
End: 2017-08-15

## 2017-08-15 NOTE — TELEPHONE ENCOUNTER
Pt informed via [employer].   Informed to call back/respond via [facility] [employer], to let us know that she has received this information and that she has an apt with .

## 2017-08-15 NOTE — TELEPHONE ENCOUNTER
----- Message from Gwen Patterson MD sent at 8/13/2017 10:11 PM EDT -----  Please call - make sure she got the My chart message about her u/s.  Her thyroid uptake scan requires further evaluation by ENT.  Does she have an appt with Dr. Olea?

## 2017-08-18 ENCOUNTER — TRANSCRIBE ORDERS (OUTPATIENT)
Dept: ADMINISTRATIVE | Facility: HOSPITAL | Age: 67
End: 2017-08-18

## 2017-08-18 DIAGNOSIS — E04.1 RIGHT THYROID NODULE: Primary | ICD-10-CM

## 2017-08-24 ENCOUNTER — APPOINTMENT (OUTPATIENT)
Dept: ULTRASOUND IMAGING | Facility: HOSPITAL | Age: 67
End: 2017-08-24
Attending: OTOLARYNGOLOGY

## 2017-08-30 ENCOUNTER — HOSPITAL ENCOUNTER (OUTPATIENT)
Dept: ULTRASOUND IMAGING | Facility: HOSPITAL | Age: 67
Discharge: HOME | End: 2017-08-30
Attending: OTOLARYNGOLOGY | Admitting: OTOLARYNGOLOGY

## 2017-08-30 VITALS
HEIGHT: 63 IN | DIASTOLIC BLOOD PRESSURE: 79 MMHG | RESPIRATION RATE: 16 BRPM | WEIGHT: 150 LBS | BODY MASS INDEX: 26.58 KG/M2 | OXYGEN SATURATION: 94 % | TEMPERATURE: 97.3 F | HEART RATE: 70 BPM | SYSTOLIC BLOOD PRESSURE: 129 MMHG

## 2017-08-30 DIAGNOSIS — E04.1 RIGHT THYROID NODULE: ICD-10-CM

## 2017-08-30 LAB
INR PPP: 1 (ref 0.8–1.2)
PROTHROMBIN TIME: 12 SECONDS (ref 12.8–15.2)

## 2017-08-30 PROCEDURE — 88305 TISSUE EXAM BY PATHOLOGIST: CPT | Performed by: OTOLARYNGOLOGY

## 2017-08-30 PROCEDURE — 76942 ECHO GUIDE FOR BIOPSY: CPT

## 2017-08-30 PROCEDURE — 88173 CYTOPATH EVAL FNA REPORT: CPT | Performed by: OTOLARYNGOLOGY

## 2017-08-30 RX ORDER — LIDOCAINE HYDROCHLORIDE 10 MG/ML
10 INJECTION, SOLUTION INFILTRATION; PERINEURAL ONCE
Status: COMPLETED | OUTPATIENT
Start: 2017-08-30 | End: 2017-08-30

## 2017-08-30 RX ORDER — ACETAMINOPHEN 500 MG
1000 TABLET ORAL EVERY 6 HOURS PRN
COMMUNITY

## 2017-08-30 RX ADMIN — LIDOCAINE HYDROCHLORIDE 7 ML: 10 INJECTION, SOLUTION INFILTRATION; PERINEURAL at 14:06

## 2017-08-31 ENCOUNTER — TELEPHONE (OUTPATIENT)
Dept: INTERVENTIONAL RADIOLOGY/VASCULAR | Facility: HOSPITAL | Age: 67
End: 2017-08-31

## 2017-08-31 LAB
CYTO UR: NORMAL
LAB AP CASE REPORT: NORMAL
LAB AP DIAGNOSIS COMMENT: NORMAL
LAB AP NON-GYN SPECIMEN ADEQUACY: NORMAL
Lab: NORMAL
PATH REPORT.FINAL DX SPEC: NORMAL
PATH REPORT.GROSS SPEC: NORMAL

## 2017-09-16 NOTE — OP NOTE
Operative Note:    Pre-op Dx:  Personal history of a small bowel GIST tumor    Post-op Dx: Gastritis and esophagitis    Procedure:  EGD with cold forceps biopsies       Surgeon:  Mini Meraz M.D.    Anesthesia:  MAC    EBL:  Minium    Specimen:  GE/Antral Bxs    Complications:  None    Findings:  Esophagitis, Gastritis     Indications:  This is a pleasant 66 y.o. female  that presented with a history of a small bowel resection for a gi bleed that was GIST    Procedure:     EGD:   After a bite block was placed orally, the patient was sedated and positioned in left lateral decubitus position. The scope was introduced into the posterior pharynx under direct visualization which appeared normal.  Esophagus was intubated under direct visualization and the scope was advanced to the distal GE junction.  There was no abnormalities in the upper or mid esophagus.  The lower esophageal GE junction Z- line was irregular.  Multiple random biopsies were obtained with the cold biopsy forceps. Stomach was entered and insufflated.  The greater and lesser curvature appeared normal.  Scope was advanced into the antrum where the patient had striated gastritis.  Multiple random biopsies were obtained with the cold biopsy forceps and submitted to pathology.  Scope was advanced through the pylorus and into the duodenum to the third portion which all appeared normal.  Scope was slowly withdrawn and retroflexed without evidence of a HH. There were no new findings upon the withdrawal of the scope. The stomach was desufflated.  The procedure was terminated and the patient was transferred to recovery room in stable condition.    Assessment/Plan:  Esophagitis/Gastritis, await pathology results for treatment plan.  I will have the patient call my office on Wednesday for the pathology results.    Mini Meraz MD  General, Minimally Invasive and Endoscopic Surgery  [facility]    [address], [address]  [place]  [place], [address]  P: [phone]  F: [phone]    Cc:  Gwen Patterson MD

## 2017-10-16 RX ORDER — OMEPRAZOLE 40 MG/1
CAPSULE, DELAYED RELEASE ORAL
Qty: 30 CAPSULE | Refills: 3 | Status: SHIPPED | OUTPATIENT
Start: 2017-10-16 | End: 2018-02-17 | Stop reason: SDUPTHER

## 2017-11-07 ENCOUNTER — APPOINTMENT (OUTPATIENT)
Dept: WOMENS IMAGING | Facility: HOSPITAL | Age: 67
End: 2017-11-07

## 2017-11-07 PROCEDURE — G0202 SCR MAMMO BI INCL CAD: HCPCS | Performed by: RADIOLOGY

## 2017-11-07 PROCEDURE — 77063 BREAST TOMOSYNTHESIS BI: CPT | Performed by: RADIOLOGY

## 2018-01-24 ENCOUNTER — HOSPITAL ENCOUNTER (OUTPATIENT)
Dept: PET IMAGING | Facility: HOSPITAL | Age: 68
Discharge: HOME | End: 2018-01-24
Attending: INTERNAL MEDICINE | Admitting: INTERNAL MEDICINE

## 2018-01-24 ENCOUNTER — LAB (OUTPATIENT)
Dept: LAB | Facility: HOSPITAL | Age: 68
End: 2018-01-24

## 2018-01-24 DIAGNOSIS — C49.A3 GIST (GASTROINTESTINAL STROMAL TUMOR) OF SMALL BOWEL, MALIGNANT (HCC): ICD-10-CM

## 2018-01-24 DIAGNOSIS — D50.0 IRON DEFICIENCY ANEMIA DUE TO CHRONIC BLOOD LOSS: ICD-10-CM

## 2018-01-24 LAB
ALBUMIN SERPL-MCNC: 4.9 G/DL (ref 3.5–5.2)
ALBUMIN/GLOB SERPL: 1.7 G/DL (ref 1.1–2.4)
ALP SERPL-CCNC: 82 U/L (ref 38–116)
ALT SERPL W P-5'-P-CCNC: 22 U/L (ref 0–33)
ANION GAP SERPL CALCULATED.3IONS-SCNC: 14.9 MMOL/L
AST SERPL-CCNC: 27 U/L (ref 0–32)
BASOPHILS # BLD AUTO: 0.07 10*3/MM3 (ref 0–0.1)
BASOPHILS NFR BLD AUTO: 0.9 % (ref 0–1.1)
BILIRUB SERPL-MCNC: 0.4 MG/DL (ref 0.1–1.2)
BUN BLD-MCNC: 15 MG/DL (ref 6–20)
BUN/CREAT SERPL: 24.6 (ref 7.3–30)
CALCIUM SPEC-SCNC: 10.3 MG/DL (ref 8.5–10.2)
CHLORIDE SERPL-SCNC: 99 MMOL/L (ref 98–107)
CO2 SERPL-SCNC: 28.1 MMOL/L (ref 22–29)
CREAT BLD-MCNC: 0.61 MG/DL (ref 0.6–1.1)
CREAT BLDA-MCNC: 0.6 MG/DL (ref 0.6–1.3)
DEPRECATED RDW RBC AUTO: 41.7 FL (ref 37–49)
EOSINOPHIL # BLD AUTO: 0.16 10*3/MM3 (ref 0–0.36)
EOSINOPHIL NFR BLD AUTO: 2.1 % (ref 1–5)
ERYTHROCYTE [DISTWIDTH] IN BLOOD BY AUTOMATED COUNT: 12.2 % (ref 11.7–14.5)
FERRITIN SERPL-MCNC: 43.7 NG/ML
GFR SERPL CREATININE-BSD FRML MDRD: 98 ML/MIN/1.73
GLOBULIN UR ELPH-MCNC: 2.9 GM/DL (ref 1.8–3.5)
GLUCOSE BLD-MCNC: 74 MG/DL (ref 74–124)
HCT VFR BLD AUTO: 44.7 % (ref 34–45)
HGB BLD-MCNC: 14.3 G/DL (ref 11.5–14.9)
IMM GRANULOCYTES # BLD: 0.06 10*3/MM3 (ref 0–0.03)
IMM GRANULOCYTES NFR BLD: 0.8 % (ref 0–0.5)
IRON 24H UR-MRATE: 73 MCG/DL (ref 37–145)
IRON SATN MFR SERPL: 18 % (ref 14–48)
LYMPHOCYTES # BLD AUTO: 1.26 10*3/MM3 (ref 1–3.5)
LYMPHOCYTES NFR BLD AUTO: 16.2 % (ref 20–49)
MCH RBC QN AUTO: 29.7 PG (ref 27–33)
MCHC RBC AUTO-ENTMCNC: 32 G/DL (ref 32–35)
MCV RBC AUTO: 92.9 FL (ref 83–97)
MONOCYTES # BLD AUTO: 0.63 10*3/MM3 (ref 0.25–0.8)
MONOCYTES NFR BLD AUTO: 8.1 % (ref 4–12)
NEUTROPHILS # BLD AUTO: 5.58 10*3/MM3 (ref 1.5–7)
NEUTROPHILS NFR BLD AUTO: 71.9 % (ref 39–75)
NRBC BLD MANUAL-RTO: 0 /100 WBC (ref 0–0)
PLATELET # BLD AUTO: 345 10*3/MM3 (ref 150–375)
PMV BLD AUTO: 9.9 FL (ref 8.9–12.1)
POTASSIUM BLD-SCNC: 3.5 MMOL/L (ref 3.5–4.7)
PROT SERPL-MCNC: 7.8 G/DL (ref 6.3–8)
RBC # BLD AUTO: 4.81 10*6/MM3 (ref 3.9–5)
SODIUM BLD-SCNC: 142 MMOL/L (ref 134–145)
TIBC SERPL-MCNC: 405 MCG/DL (ref 249–505)
TRANSFERRIN SERPL-MCNC: 289 MG/DL (ref 200–360)
WBC NRBC COR # BLD: 7.76 10*3/MM3 (ref 4–10)

## 2018-01-24 PROCEDURE — 82728 ASSAY OF FERRITIN: CPT | Performed by: INTERNAL MEDICINE

## 2018-01-24 PROCEDURE — 71260 CT THORAX DX C+: CPT

## 2018-01-24 PROCEDURE — 74177 CT ABD & PELVIS W/CONTRAST: CPT

## 2018-01-24 PROCEDURE — 83540 ASSAY OF IRON: CPT | Performed by: INTERNAL MEDICINE

## 2018-01-24 PROCEDURE — 82565 ASSAY OF CREATININE: CPT

## 2018-01-24 PROCEDURE — 0 IOPAMIDOL 61 % SOLUTION: Performed by: INTERNAL MEDICINE

## 2018-01-24 PROCEDURE — 36415 COLL VENOUS BLD VENIPUNCTURE: CPT | Performed by: INTERNAL MEDICINE

## 2018-01-24 PROCEDURE — 85025 COMPLETE CBC W/AUTO DIFF WBC: CPT | Performed by: INTERNAL MEDICINE

## 2018-01-24 PROCEDURE — 80053 COMPREHEN METABOLIC PANEL: CPT | Performed by: INTERNAL MEDICINE

## 2018-01-24 PROCEDURE — 0 DIATRIZOATE MEGLUMINE & SODIUM PER 1 ML: Performed by: INTERNAL MEDICINE

## 2018-01-24 PROCEDURE — 84466 ASSAY OF TRANSFERRIN: CPT | Performed by: INTERNAL MEDICINE

## 2018-01-24 RX ADMIN — DIATRIZOATE MEGLUMINE AND DIATRIZOATE SODIUM 30 ML: 660; 100 LIQUID ORAL; RECTAL at 08:12

## 2018-01-24 RX ADMIN — IOPAMIDOL 85 ML: 612 INJECTION, SOLUTION INTRAVENOUS at 09:13

## 2018-01-31 ENCOUNTER — OFFICE VISIT (OUTPATIENT)
Dept: ONCOLOGY | Facility: CLINIC | Age: 68
End: 2018-01-31

## 2018-01-31 ENCOUNTER — APPOINTMENT (OUTPATIENT)
Dept: LAB | Facility: HOSPITAL | Age: 68
End: 2018-01-31

## 2018-01-31 VITALS
BODY MASS INDEX: 27.5 KG/M2 | RESPIRATION RATE: 16 BRPM | SYSTOLIC BLOOD PRESSURE: 142 MMHG | HEART RATE: 71 BPM | WEIGHT: 155.2 LBS | DIASTOLIC BLOOD PRESSURE: 84 MMHG | OXYGEN SATURATION: 99 % | TEMPERATURE: 98.1 F | HEIGHT: 63 IN

## 2018-01-31 DIAGNOSIS — Z85.09 HISTORY OF GASTROINTESTINAL STROMAL TUMOR (GIST): ICD-10-CM

## 2018-01-31 DIAGNOSIS — C49.A3 GIST (GASTROINTESTINAL STROMAL TUMOR) OF SMALL BOWEL, MALIGNANT (HCC): Primary | ICD-10-CM

## 2018-01-31 PROCEDURE — 99213 OFFICE O/P EST LOW 20 MIN: CPT | Performed by: INTERNAL MEDICINE

## 2018-01-31 PROCEDURE — G0463 HOSPITAL OUTPT CLINIC VISIT: HCPCS | Performed by: INTERNAL MEDICINE

## 2018-01-31 NOTE — PROGRESS NOTES
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  HISTORY OF PRESENT ILLNESS:     History of Present Illness     Mrs. Comer is here today for her first outpatient visit to our office.  She had been seen in consultation during her recent admission to [facility] from 2/20/2016 to 2/26/2016.  She was admitted at that time with symptomatic anemia and GI bleeding.  On endoscopy the blood appeared to be coming from the region of the appendix and she underwent a laparoscopic appendectomy with Dr. Meraz on 2/22/2016.  Also at the same procedure she was found to have a tumor within the small bowel and had a partial small bowel resection with reanastomosis.  The pathology from that specimen showed a gastrointestinal stromal tumor (GIST) .  The tumor was 4.5 cm and appeared to be low-grade with 0 mitotic figures per high-powered field.    We discussed with Miss Comer at that time that we would not recommend any postop adjuvant Gleevec therapy as her prognosis was very good with surgery alone.  We did however discuss some follow-up in the office with plans to perform surveillance CT scans for a few years after surgery.    She underwent CT scans on 1/24/2018 which showed no obvious recurrent or metastatic disease.  Her blood counts and iron studies and chemistries all look good at this point.  She reports that she is feeling fine.    Past Medical History, Past Surgical History, Social History, Family History have been reviewed and are without significant changes except as mentioned.    Review of Systems   Constitutional: Negative for activity change, appetite change, fatigue, fever and unexpected weight change.   HENT: Negative for hearing loss, nosebleeds, trouble swallowing and voice change.    Eyes: Negative for visual disturbance.   Respiratory: Negative for cough, shortness of breath and wheezing.    Cardiovascular: Negative for chest pain and palpitations.   Gastrointestinal:  "Negative for abdominal pain, diarrhea, nausea and vomiting.   Genitourinary: Negative for difficulty urinating, frequency, hematuria and urgency.   Musculoskeletal: Negative for back pain and neck pain.   Skin: Negative for rash.   Neurological: Negative for dizziness, seizures, syncope and headaches.   Hematological: Negative for adenopathy. Does not bruise/bleed easily.   Psychiatric/Behavioral: Negative for behavioral problems. The patient is not nervous/anxious.       A comprehensive 14 point review of systems was performed and was negative except as mentioned.    Medications:  The current medication list was reviewed in the EMR    ALLERGIES:    Allergies   Allergen Reactions   • Penicillins Rash     Rash over whole body       Objective      Vitals:    01/31/18 1136   BP: 142/84   Pulse: 71   Resp: 16   Temp: 98.1 °F (36.7 °C)   TempSrc: Oral   SpO2: 99%   Weight: 70.4 kg (155 lb 3.2 oz)   Height: 160 cm (62.99\")     Current Status 2/2/2017   ECOG score 0       Physical Exam   Constitutional: She is oriented to person, place, and time. She appears well-developed and well-nourished. No distress.   HENT:   Head: Normocephalic.   Eyes: Conjunctivae and EOM are normal. Pupils are equal, round, and reactive to light. No scleral icterus.   Neck: Normal range of motion. Neck supple. No JVD present. No thyromegaly present.   Cardiovascular: Normal rate and regular rhythm.  Exam reveals no gallop and no friction rub.    No murmur heard.  Pulmonary/Chest: Effort normal and breath sounds normal. She has no wheezes. She has no rales.   Abdominal: Soft. She exhibits no distension and no mass. There is no tenderness.   Musculoskeletal: Normal range of motion. She exhibits no edema or deformity.   Lymphadenopathy:     She has no cervical adenopathy.   Neurological: She is alert and oriented to person, place, and time. She has normal reflexes. No cranial nerve deficit.   Skin: Skin is warm and dry. No rash noted. No erythema. "   Psychiatric: She has a normal mood and affect. Her behavior is normal. Judgment normal.        RECENT LABS:  Hematology WBC   Date Value Ref Range Status   01/24/2018 7.76 4.00 - 10.00 10*3/mm3 Final     RBC   Date Value Ref Range Status   01/24/2018 4.81 3.90 - 5.00 10*6/mm3 Final     Hemoglobin   Date Value Ref Range Status   01/24/2018 14.3 11.5 - 14.9 g/dL Final     Hematocrit   Date Value Ref Range Status   01/24/2018 44.7 34.0 - 45.0 % Final     Platelets   Date Value Ref Range Status   01/24/2018 345 150 - 375 10*3/mm3 Final              Lab Results   Component Value Date    IRON 73 01/24/2018    TIBC 405 01/24/2018    FERRITIN 43.70 01/24/2018       Lab Results   Component Value Date    GLUCOSE 74 01/24/2018    BUN 15 01/24/2018    CREATININE 0.60 01/24/2018    EGFRIFNONA 98 01/24/2018    EGFRIFAFRI 109 03/08/2016    BCR 24.6 01/24/2018    CO2 28.1 01/24/2018    CALCIUM 10.3 (H) 01/24/2018    PROTENTOTREF 7.1 03/02/2015    ALBUMIN 4.90 01/24/2018    LABIL2 1.7 01/24/2018    AST 27 01/24/2018    ALT 22 01/24/2018     CT OF THE CHEST ABDOMEN AND PELVIS WITH CONTRAST 01/24/2018       HISTORY: Follow-up gastrointestinal stromal tumor of small bowel.  Previously resected.      TECHNIQUE: Spiral images were obtained from the lung apices to the  symphysis pubis after intravenous and oral contrast.      COMPARISON: Previous CT of the chest, abdomen, and pelvis from  01/26/2017 is compared.      FINDINGS: Again seen is enlargement of the right thyroid lobe with  several low-density right thyroid nodules. These appear similar to the  01/26/2017 study. Left thyroid lobe appears to have been resected.      No pathologically enlarged hilar or mediastinal lymph nodes are seen.      Small calcified granuloma seen in the lingula. No noncalcified lung  masses are seen.      The liver, gallbladder, spleen, pancreas, and adrenals appear  unremarkable. There are small nonobstructing bilateral renal stones.      Surgical  sutures are seen from previous small bowel resection. No  recurrent or residual masses are seen in this region.      The uterus and urinary bladder appear unremarkable. No pathologic  lymphadenopathy is seen.      There is thoracolumbar scoliosis.      No bony lesions are seen.      Lipoma is seen in the gluteus musculature on the left measuring  approximately 3.1 cm, and also present on the 01/26/2017 study.      IMPRESSION:  1. Status post small bowel resection.  2. Nonobstructing bilateral renal stones.  3. The chest, abdomen, and pelvis appear stable with no new evidence of  metastatic disease.        Assessment/Plan      1.  Gastrointestinal stromal tumor of the small bowel resected 2/22/2016 by Dr. Meraz with good margins.  The tumor appeared to be low-grade with good prognostic features.  Patient continues to do well and CT scans show no definite evidence of recurrent or metastatic disease now 2 years out from surgery.  2.  Iron deficiency anemia due to GI blood loss. Resolved.     Plan    1. We will schedule a follow-up visit in our office in 12 months with repeat CT scan and labs one week prior to the visit.                    1/31/2018      CC:

## 2018-02-19 RX ORDER — OMEPRAZOLE 40 MG/1
CAPSULE, DELAYED RELEASE ORAL
Qty: 30 CAPSULE | Refills: 2 | OUTPATIENT
Start: 2018-02-19

## 2018-02-19 RX ORDER — OMEPRAZOLE 40 MG/1
CAPSULE, DELAYED RELEASE ORAL
Qty: 30 CAPSULE | Refills: 2 | Status: SHIPPED | OUTPATIENT
Start: 2018-02-19 | End: 2018-05-21 | Stop reason: SDUPTHER

## 2018-03-22 DIAGNOSIS — Z00.00 HEALTH CARE MAINTENANCE: Primary | ICD-10-CM

## 2018-03-22 DIAGNOSIS — E78.2 MIXED HYPERLIPIDEMIA: ICD-10-CM

## 2018-03-23 LAB
ALBUMIN SERPL-MCNC: 4.7 G/DL (ref 3.5–5.2)
ALBUMIN/GLOB SERPL: 1.9 G/DL
ALP SERPL-CCNC: 94 U/L (ref 39–117)
ALT SERPL-CCNC: 30 U/L (ref 1–33)
APPEARANCE UR: CLEAR
AST SERPL-CCNC: 23 U/L (ref 1–32)
BACTERIA #/AREA URNS HPF: NORMAL /HPF
BASOPHILS # BLD AUTO: 0.04 10*3/MM3 (ref 0–0.2)
BASOPHILS NFR BLD AUTO: 0.7 % (ref 0–1.5)
BILIRUB SERPL-MCNC: 0.4 MG/DL (ref 0.1–1.2)
BILIRUB UR QL STRIP: NEGATIVE
BUN SERPL-MCNC: 21 MG/DL (ref 8–23)
BUN/CREAT SERPL: 30 (ref 7–25)
CALCIUM SERPL-MCNC: 10.2 MG/DL (ref 8.6–10.5)
CHLORIDE SERPL-SCNC: 100 MMOL/L (ref 98–107)
CHOLEST SERPL-MCNC: 242 MG/DL (ref 0–200)
CO2 SERPL-SCNC: 27.4 MMOL/L (ref 22–29)
COLOR UR: YELLOW
CREAT SERPL-MCNC: 0.7 MG/DL (ref 0.57–1)
EOSINOPHIL # BLD AUTO: 0.18 10*3/MM3 (ref 0–0.7)
EOSINOPHIL NFR BLD AUTO: 3.1 % (ref 0.3–6.2)
EPI CELLS #/AREA URNS HPF: NORMAL /HPF
ERYTHROCYTE [DISTWIDTH] IN BLOOD BY AUTOMATED COUNT: 13.3 % (ref 11.7–13)
GFR SERPLBLD CREATININE-BSD FMLA CKD-EPI: 101 ML/MIN/1.73
GFR SERPLBLD CREATININE-BSD FMLA CKD-EPI: 83 ML/MIN/1.73
GLOBULIN SER CALC-MCNC: 2.5 GM/DL
GLUCOSE SERPL-MCNC: 81 MG/DL (ref 65–99)
GLUCOSE UR QL: NEGATIVE
HCT VFR BLD AUTO: 41.7 % (ref 35.6–45.5)
HDLC SERPL-MCNC: 91 MG/DL (ref 40–60)
HGB BLD-MCNC: 13.5 G/DL (ref 11.9–15.5)
HGB UR QL STRIP: NEGATIVE
IMM GRANULOCYTES # BLD: 0 10*3/MM3 (ref 0–0.03)
IMM GRANULOCYTES NFR BLD: 0 % (ref 0–0.5)
KETONES UR QL STRIP: NEGATIVE
LDLC SERPL CALC-MCNC: 138 MG/DL (ref 0–100)
LEUKOCYTE ESTERASE UR QL STRIP: NEGATIVE
LYMPHOCYTES # BLD AUTO: 1.52 10*3/MM3 (ref 0.9–4.8)
LYMPHOCYTES NFR BLD AUTO: 25.9 % (ref 19.6–45.3)
MCH RBC QN AUTO: 30.2 PG (ref 26.9–32)
MCHC RBC AUTO-ENTMCNC: 32.4 G/DL (ref 32.4–36.3)
MCV RBC AUTO: 93.3 FL (ref 80.5–98.2)
MICRO URNS: NORMAL
MICRO URNS: NORMAL
MONOCYTES # BLD AUTO: 0.48 10*3/MM3 (ref 0.2–1.2)
MONOCYTES NFR BLD AUTO: 8.2 % (ref 5–12)
MUCOUS THREADS URNS QL MICRO: PRESENT /HPF
NEUTROPHILS # BLD AUTO: 3.64 10*3/MM3 (ref 1.9–8.1)
NEUTROPHILS NFR BLD AUTO: 62.1 % (ref 42.7–76)
NITRITE UR QL STRIP: NEGATIVE
PH UR STRIP: 7.5 [PH] (ref 5–7.5)
PLATELET # BLD AUTO: 357 10*3/MM3 (ref 140–500)
POTASSIUM SERPL-SCNC: 4.4 MMOL/L (ref 3.5–5.2)
PROT SERPL-MCNC: 7.2 G/DL (ref 6–8.5)
PROT UR QL STRIP: NEGATIVE
RBC # BLD AUTO: 4.47 10*6/MM3 (ref 3.9–5.2)
RBC #/AREA URNS HPF: NORMAL /HPF
SODIUM SERPL-SCNC: 141 MMOL/L (ref 136–145)
SP GR UR: 1.01 (ref 1–1.03)
TRIGL SERPL-MCNC: 67 MG/DL (ref 0–150)
TSH SERPL DL<=0.005 MIU/L-ACNC: 2.32 MIU/ML (ref 0.27–4.2)
URINALYSIS REFLEX: NORMAL
UROBILINOGEN UR STRIP-MCNC: 0.2 MG/DL (ref 0.2–1)
VLDLC SERPL CALC-MCNC: 13.4 MG/DL (ref 5–40)
WBC # BLD AUTO: 5.86 10*3/MM3 (ref 4.5–10.7)
WBC #/AREA URNS HPF: NORMAL /HPF

## 2018-03-28 ENCOUNTER — OFFICE VISIT (OUTPATIENT)
Dept: INTERNAL MEDICINE | Facility: CLINIC | Age: 68
End: 2018-03-28

## 2018-03-28 VITALS
HEART RATE: 86 BPM | DIASTOLIC BLOOD PRESSURE: 62 MMHG | RESPIRATION RATE: 18 BRPM | HEIGHT: 63 IN | WEIGHT: 152 LBS | BODY MASS INDEX: 26.93 KG/M2 | OXYGEN SATURATION: 98 % | SYSTOLIC BLOOD PRESSURE: 128 MMHG

## 2018-03-28 DIAGNOSIS — C49.A3 GIST (GASTROINTESTINAL STROMAL TUMOR) OF SMALL BOWEL, MALIGNANT (HCC): ICD-10-CM

## 2018-03-28 DIAGNOSIS — E78.2 MIXED HYPERLIPIDEMIA: ICD-10-CM

## 2018-03-28 DIAGNOSIS — Z00.00 MEDICARE ANNUAL WELLNESS VISIT, SUBSEQUENT: Primary | ICD-10-CM

## 2018-03-28 PROBLEM — M25.512 ACUTE PAIN OF LEFT SHOULDER: Status: RESOLVED | Noted: 2017-03-22 | Resolved: 2018-03-28

## 2018-03-28 PROBLEM — K21.9 GERD (GASTROESOPHAGEAL REFLUX DISEASE): Status: ACTIVE | Noted: 2018-03-28

## 2018-03-28 PROCEDURE — G0439 PPPS, SUBSEQ VISIT: HCPCS | Performed by: INTERNAL MEDICINE

## 2018-03-28 PROCEDURE — 99213 OFFICE O/P EST LOW 20 MIN: CPT | Performed by: INTERNAL MEDICINE

## 2018-03-28 NOTE — PATIENT INSTRUCTIONS
Medicare Wellness  Personal Prevention Plan of Service     Date of Office Visit:  2018  Encounter Provider:  Gwne Patterson MD  Place of Service:  [facility] INTER[facility]  Patient Name: Ashley Comer  :  1950    As part of the Medicare Wellness portion of your visit today, we are providing you with this personalized preventive plan of services (PPPS). This plan is based upon recommendations of the [place] Preventive Services Task Force (USPSTF) and the Advisory Committee on Immunization Practices (ACIP).    This lists the preventive care services that should be considered, and provides dates of when you are due. Items listed as completed are up-to-date and do not require any further intervention.    Health Maintenance   Topic Date Due   • DXA SCAN  2016   • INFLUENZA VACCINE  2017   • MEDICARE ANNUAL WELLNESS  2018   • MAMMOGRAM  2018   • LIPID PANEL  2019   • TDAP/TD VACCINES (2 - Td) 2022   • COLONOSCOPY  2027   • HEPATITIS C SCREENING  Completed   • PNEUMOCOCCAL VACCINES (65+ LOW/MEDIUM RISK)  Completed   • ZOSTER VACCINE  Completed       No orders of the defined types were placed in this encounter.      No Follow-up on file.

## 2018-03-28 NOTE — PROGRESS NOTES
Medicare Annual Wellness Visit    Chief Complaint:  Annual Exam (SUB AWV) and Hyperlipidemia      History of Present Illness    Ashley Comer is a 67 y.o. female who presents for an Annual Wellness Visit. In addition, we addressed the following health issues:    Mammo:11/7/17  Pap:11/7/17 Women;s First  DEXA:11/7/17 Women's First  C-scope:7/28/17 due 2 years  Flu shot:10/2017  Dental Exam: twice a year  Eye Exam: yearly  Exercise:  Daily activity cleaning out her father in law's house.  Advanced Care Planning:  has an advance directive - a copy HAS NOT been provided   Immunization History   Administered Date(s) Administered   • Flu Vaccine High Dose PF 65YR+ 10/01/2017   • Pneumococcal Conjugate 13-Valent (PCV13) 06/22/2015   • Pneumococcal Polysaccharide (PPSV23) 03/16/2016   • Tdap 02/22/2012     Ashley is here for follow up.  She had thyroid nodule bx on 8/30/17 which was benign.  She follows with Dr. Mejias for her GIST.  The second year of widowhood has been more difficult.  She has also had to help deal with her 87 y/o father in law who has been ill since January.  He is a hoarder and she has been helping her brother in law clean up his house.  This is certainly keeping her active but not what she had in mind as far as exercise.      Review of Systems   Constitution: Negative for chills, fever and malaise/fatigue.   HENT: Negative for hearing loss, hoarse voice and sore throat.    Eyes: Positive for visual disturbance (she is seeing Dr. Luo for her left eye). Negative for blurred vision and double vision.   Cardiovascular: Negative for chest pain, leg swelling and palpitations.   Respiratory: Negative for cough and shortness of breath.    Endocrine: Negative for polydipsia and polyuria.   Hematologic/Lymphatic: Does not bruise/bleed easily.   Skin: Negative for rash and suspicious lesions.   Musculoskeletal: Positive for joint pain (knee pain). Negative for arthritis and myalgias.   Gastrointestinal:  Negative for bloating, abdominal pain, change in bowel habit, constipation, diarrhea, dysphagia, hematochezia, melena, nausea and vomiting.   Genitourinary: Negative for dysuria and hematuria.   Neurological: Negative for dizziness, headaches and light-headedness.   Psychiatric/Behavioral: Negative for depression. The patient is not nervous/anxious.        Past Medical History:   Diagnosis Date   • Anemia    • Disease of thyroid gland     THYROID NODULES   • Edema    • Esophageal reflux    • History of GI bleed    • History of transfusion 02/2016    6 UNITS   • Migraine    • Neck pain    • 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       Past Surgical History:   Procedure Laterality Date   • APPENDECTOMY     • CATARACT EXTRACTION Bilateral    • COLONOSCOPY N/A 7/28/2017    Procedure: COLONOSCOPY INTO CECUM WITH POLYPECTOMY;  Surgeon: Mini Meraz MD;  Location: [facility] ENDOSCOPY;  Service:    • ENDOSCOPY N/A 7/28/2017    Procedure: ESOPHAGOGASTRODUODENOSCOPY WITH BIOPSIES;  Surgeon: Mini Meraz MD;  Location: [facility] ENDOSCOPY;  Service:    • FOOT SURGERY Right     DEBRIDMENT   • LAPAROSCOPIC APPENDECTOMY     • SMALL INTESTINE SURGERY  02/2016    for GIST    • THYROID SURGERY Left 1993    thyroid lobectomy    • VITRECTOMY Right 11/2015       Social History     Social History   • Marital status:      Spouse name: Jimmy   • Number of children: 2   • Years of education: COLLEGE     Occupational History   • [employer]     Social History Main Topics   • Smoking status: Former Smoker     Packs/day: 1.00     Years: 25.00     Quit date: 1992   • Smokeless tobacco: Never Used   • Alcohol use Yes      Comment: social use   • Drug use: No   • Sexual activity: Defer     Other Topics Concern   • Not on file     Social History Narrative    The patient'  was diagnosed with esophageal cancer on upper GI endoscopy by  Dr. Jose Enrique Yuan.        Family History   Problem Relation Age of Onset   • Dementia Mother    • Hypertension Mother    • Tuberculosis Mother    • Pneumonia Mother    • Prostate cancer Father    • Tuberculosis Father    • Cancer Sister      teratoma, malignant    • Breast cancer Maternal Aunt    • Lung cancer Paternal Aunt    • Brain cancer Paternal Aunt    • Breast cancer Paternal Aunt    • Breast cancer Sister    • Malig Hyperthermia Neg Hx        Allergies   Allergen Reactions   • Penicillins Rash     Rash over whole body       Current Outpatient Prescriptions on File Prior to Visit   Medication Sig Dispense Refill   • acetaminophen (TYLENOL) 500 MG tablet Take 1,000 mg by mouth Every 6 (Six) Hours As Needed for Mild Pain .     • B Complex-C (SUPER B COMPLEX PO) Take 1 tablet by mouth daily. As directed     • calcium citrate-vitamin d (CITRACAL) 200-250 MG-UNIT tablet tablet Take 1 tablet by mouth 2 (Two) Times a Day.     • Cholecalciferol (VITAMIN D3) 1000 UNITS capsule Take 1,000 Units by mouth Daily. Take as directed      • Coenzyme Q10 (CO Q 10 PO) Take 100 mg by mouth daily.     • HYALURONIC ACID-VITAMIN C PO Take  by mouth.     • Misc Natural Products (OSTEO BI-FLEX TRIPLE STRENGTH PO) Take 2 tablets by mouth Daily.     • Multiple Vitamins-Minerals (MULTI FOR HER) tablet Take 1 tablet by mouth daily.     • naproxen sodium (ALEVE) 220 MG tablet Take 220 mg by mouth 2 (Two) Times a Day With Meals.     • omeprazole (priLOSEC) 40 MG capsule TAKE ONE CAPSULE BY MOUTH DAILY 30 capsule 2   • Psyllium 500 MG capsule Take 1 capsule by mouth Daily. Take as directed      • [DISCONTINUED] omeprazole (priLOSEC) 40 MG capsule Take 40 mg by mouth Daily.     • [DISCONTINUED] rizatriptan (MAXALT) 10 MG tablet Take 10 mg by mouth 1 (One) Time As Needed.       No current facility-administered medications on file prior to visit.        Patient Active Problem List   Diagnosis   • Migraine   • GIST (gastrointestinal stromal  "tumor) of small bowel, malignant   • Hoarseness of voice   • Mixed hyperlipidemia   • Rectal polyp   • Neck pain   • Multiple thyroid nodules   • Spinal stenosis in cervical region   • Cervical radiculopathy due to intervertebral disc disorder   • GERD (gastroesophageal reflux disease)       Health Risk Assessment/Depression Screen/Functional and Cognitive Screening:   The patient has completed a Health Risk Assessment & Depression screen. These have been reviewed with them and have been scanned as a separate documents.    Age-appropriate Screening Schedule:  Refer to the list below for future screening recommendations based on patient's age. Orders for these recommended tests are listed in the plan section. The patient has been provided with a written plan.     I have reviewed their problem list, past medical history, family history, social history, and surgical history.     Vitals:    03/28/18 1427   BP: 128/62   Pulse: 86   Resp: 18   SpO2: 98%   Weight: 68.9 kg (152 lb)   Height: 160 cm (62.99\")   PainSc: 0-No pain       Body mass index is 26.93 kg/m².    Physical Exam   Constitutional: She is oriented to person, place, and time. She appears well-developed and well-nourished. No distress.   HENT:   Head: Normocephalic and atraumatic.   Nose: Nose normal.   Mouth/Throat: Oropharynx is clear and moist.   Eyes: Conjunctivae and EOM are normal. Pupils are equal, round, and reactive to light. No scleral icterus.   Neck: Normal range of motion. Neck supple. No thyromegaly present.   Cardiovascular: Normal rate, regular rhythm and normal heart sounds.  Exam reveals no gallop and no friction rub.    No murmur heard.  Pulses:       Carotid pulses are 2+ on the right side, and 2+ on the left side.       Femoral pulses are 2+ on the right side, and 2+ on the left side.       Dorsalis pedis pulses are 2+ on the right side, and 2+ on the left side.        Posterior tibial pulses are 2+ on the right side, and 2+ on the left " side.   Pulmonary/Chest: Effort normal and breath sounds normal. No respiratory distress. She has no wheezes. She has no rales. Right breast exhibits no mass and no nipple discharge. Left breast exhibits no mass and no nipple discharge.   Abdominal: Soft. Bowel sounds are normal. She exhibits no distension and no mass. There is no tenderness.   Musculoskeletal: Normal range of motion. She exhibits no edema.     Vascular Status -  Her right foot exhibits no edema. Her left foot exhibits no edema.  Skin Integrity  -  Her right foot skin is intact.Her left foot skin is intact..  Lymphadenopathy:     She has no cervical adenopathy.     She has no axillary adenopathy.        Right: No inguinal and no supraclavicular adenopathy present.        Left: No inguinal and no supraclavicular adenopathy present.   Neurological: She is alert and oriented to person, place, and time. She has normal reflexes. No cranial nerve deficit.   Skin: Skin is warm and dry.   Psychiatric: She has a normal mood and affect. Her speech is normal and behavior is normal. Judgment and thought content normal. Cognition and memory are normal.   Vitals reviewed.      Results for orders placed or performed in visit on 03/22/18   Comprehensive Metabolic Panel   Result Value Ref Range    Glucose 81 65 - 99 mg/dL    BUN 21 8 - 23 mg/dL    Creatinine 0.70 0.57 - 1.00 mg/dL    eGFR Non African Am 83 >60 mL/min/1.73    eGFR African Am 101 >60 mL/min/1.73    BUN/Creatinine Ratio 30.0 (H) 7.0 - 25.0    Sodium 141 136 - 145 mmol/L    Potassium 4.4 3.5 - 5.2 mmol/L    Chloride 100 98 - 107 mmol/L    Total CO2 27.4 22.0 - 29.0 mmol/L    Calcium 10.2 8.6 - 10.5 mg/dL    Total Protein 7.2 6.0 - 8.5 g/dL    Albumin 4.70 3.50 - 5.20 g/dL    Globulin 2.5 gm/dL    A/G Ratio 1.9 g/dL    Total Bilirubin 0.4 0.1 - 1.2 mg/dL    Alkaline Phosphatase 94 39 - 117 U/L    AST (SGOT) 23 1 - 32 U/L    ALT (SGPT) 30 1 - 33 U/L   Lipid Panel   Result Value Ref Range    Total  Cholesterol 242 (H) 0 - 200 mg/dL    Triglycerides 67 0 - 150 mg/dL    HDL Cholesterol 91 (H) 40 - 60 mg/dL    VLDL Cholesterol 13.4 5 - 40 mg/dL    LDL Cholesterol  138 (H) 0 - 100 mg/dL   TSH Rfx On Abnormal To Free T4   Result Value Ref Range    TSH 2.32 0.27 - 4.2 mIU/mL   Urinalysis With / Culture If Indicated - Urine, Clean Catch   Result Value Ref Range    Specific Gravity, UA 1.013 1.005 - 1.030    pH, UA 7.5 5.0 - 7.5    Color, UA Yellow Yellow    Appearance, UA Clear Clear    Leukocytes, UA Negative Negative    Protein Negative Negative/Trace    Glucose, UA Negative Negative    Ketones Negative Negative    Blood, UA Negative Negative    Bilirubin, UA Negative Negative    Urobilinogen, UA 0.2 0.2 - 1.0 mg/dL    Nitrite, UA Negative Negative    Microscopic Examination Comment     MICROSCOPIC EXAMINATION See below:     Urinalysis Reflex Comment    Microscopic Examination   Result Value Ref Range    WBC, UA 0-5 0 - 5 /hpf    RBC, UA 0-2 0 - 2 /hpf    Epithelial Cells (non renal) None seen 0 - 10 /hpf    Mucus, UA Present Not Estab. /hpf    Bacteria, UA None seen None seen/Few /hpf   CBC & Differential   Result Value Ref Range    WBC 5.86 4.50 - 10.70 10*3/mm3    RBC 4.47 3.90 - 5.20 10*6/mm3    Hemoglobin 13.5 11.9 - 15.5 g/dL    Hematocrit 41.7 35.6 - 45.5 %    MCV 93.3 80.5 - 98.2 fL    MCH 30.2 26.9 - 32.0 pg    MCHC 32.4 32.4 - 36.3 g/dL    RDW 13.3 (H) 11.7 - 13.0 %    Platelets 357 140 - 500 10*3/mm3    Neutrophil Rel % 62.1 42.7 - 76.0 %    Lymphocyte Rel % 25.9 19.6 - 45.3 %    Monocyte Rel % 8.2 5.0 - 12.0 %    Eosinophil Rel % 3.1 0.3 - 6.2 %    Basophil Rel % 0.7 0.0 - 1.5 %    Neutrophils Absolute 3.64 1.90 - 8.10 10*3/mm3    Lymphocytes Absolute 1.52 0.90 - 4.80 10*3/mm3    Monocytes Absolute 0.48 0.20 - 1.20 10*3/mm3    Eosinophils Absolute 0.18 0.00 - 0.70 10*3/mm3    Basophils Absolute 0.04 0.00 - 0.20 10*3/mm3    Immature Granulocyte Rel % 0.0 0.0 - 0.5 %    Immature Grans Absolute 0.00 0.00 -  0.03 10*3/mm3       Assessment & Plan:    Diagnoses and all orders for this visit:    Medicare annual wellness visit, subsequent    Mixed hyperlipidemia    GIST (gastrointestinal stromal tumor) of small bowel, malignant    Other orders  -     Multiple Vitamins-Minerals (PRESERVISION AREDS 2 PO); Take  by mouth.  -     Discontinue: Multiple Vitamins-Minerals (HAIR SKIN AND NAILS FORMULA PO); Take  by mouth.        Discussion/Summary  Ashley is here for her AWV and follow up.  She is up to date on all health maintenance.  Her labs all look very good.  She is going to get back to her walking routine as soon as the weather improves.  She is dealing with some continued grief in this second year since her  passed away.  Her labs all look great.  She is going to check with Dr. Olea about whether he will order her follow up yearly thyroid ultrasound or if he wants me to order this.  She has followed up with Dr. Mejias for her GIST that she had resected 2/22/16 by Dr. Meraz and had CTs done in January.        Follow Up:  Return in about 1 year (around 3/28/2019) for Annual physical, with fasting labs prior.     An After Visit Summary and PPPS with all of these plans were given to the patient.

## 2018-05-21 RX ORDER — OMEPRAZOLE 40 MG/1
CAPSULE, DELAYED RELEASE ORAL
Qty: 30 CAPSULE | Refills: 1 | Status: SHIPPED | OUTPATIENT
Start: 2018-05-21 | End: 2018-07-18 | Stop reason: SDUPTHER

## 2018-07-18 RX ORDER — OMEPRAZOLE 40 MG/1
CAPSULE, DELAYED RELEASE ORAL
Qty: 30 CAPSULE | Refills: 3 | Status: SHIPPED | OUTPATIENT
Start: 2018-07-18 | End: 2018-11-17 | Stop reason: SDUPTHER

## 2018-09-06 ENCOUNTER — TRANSCRIBE ORDERS (OUTPATIENT)
Dept: ADMINISTRATIVE | Facility: HOSPITAL | Age: 68
End: 2018-09-06

## 2018-09-06 DIAGNOSIS — E04.1 THYROID NODULE: Primary | ICD-10-CM

## 2018-09-17 ENCOUNTER — HOSPITAL ENCOUNTER (OUTPATIENT)
Dept: ULTRASOUND IMAGING | Facility: HOSPITAL | Age: 68
Discharge: HOME | End: 2018-09-17
Attending: OTOLARYNGOLOGY | Admitting: OTOLARYNGOLOGY

## 2018-09-17 DIAGNOSIS — E04.1 THYROID NODULE: ICD-10-CM

## 2018-09-17 PROCEDURE — 76536 US EXAM OF HEAD AND NECK: CPT

## 2018-11-13 ENCOUNTER — APPOINTMENT (OUTPATIENT)
Dept: WOMENS IMAGING | Facility: HOSPITAL | Age: 68
End: 2018-11-13

## 2018-11-13 PROCEDURE — 77063 BREAST TOMOSYNTHESIS BI: CPT | Performed by: RADIOLOGY

## 2018-11-13 PROCEDURE — 77067 SCR MAMMO BI INCL CAD: CPT | Performed by: RADIOLOGY

## 2018-11-19 RX ORDER — OMEPRAZOLE 40 MG/1
CAPSULE, DELAYED RELEASE ORAL
Qty: 30 CAPSULE | Refills: 2 | Status: SHIPPED | OUTPATIENT
Start: 2018-11-19 | End: 2019-02-15 | Stop reason: SDUPTHER

## 2019-01-23 ENCOUNTER — LAB (OUTPATIENT)
Dept: LAB | Facility: HOSPITAL | Age: 69
End: 2019-01-23

## 2019-01-23 ENCOUNTER — HOSPITAL ENCOUNTER (OUTPATIENT)
Dept: PET IMAGING | Facility: HOSPITAL | Age: 69
Discharge: HOME | End: 2019-01-23
Attending: INTERNAL MEDICINE | Admitting: INTERNAL MEDICINE

## 2019-01-23 DIAGNOSIS — Z85.09 HISTORY OF GASTROINTESTINAL STROMAL TUMOR (GIST): ICD-10-CM

## 2019-01-23 DIAGNOSIS — C49.A3 GIST (GASTROINTESTINAL STROMAL TUMOR) OF SMALL BOWEL, MALIGNANT (HCC): ICD-10-CM

## 2019-01-23 LAB
ALBUMIN SERPL-MCNC: 4.6 G/DL (ref 3.5–5.2)
ALBUMIN/GLOB SERPL: 1.8 G/DL (ref 1.1–2.4)
ALP SERPL-CCNC: 75 U/L (ref 38–116)
ALT SERPL W P-5'-P-CCNC: 22 U/L (ref 0–33)
ANION GAP SERPL CALCULATED.3IONS-SCNC: 13.3 MMOL/L
AST SERPL-CCNC: 23 U/L (ref 0–32)
BILIRUB SERPL-MCNC: 0.4 MG/DL (ref 0.1–1.2)
BUN BLD-MCNC: 16 MG/DL (ref 6–20)
BUN/CREAT SERPL: 26.2 (ref 7.3–30)
CALCIUM SPEC-SCNC: 9.9 MG/DL (ref 8.5–10.2)
CHLORIDE SERPL-SCNC: 102 MMOL/L (ref 98–107)
CO2 SERPL-SCNC: 25.7 MMOL/L (ref 22–29)
CREAT BLD-MCNC: 0.61 MG/DL (ref 0.6–1.1)
CREAT BLDA-MCNC: 0.6 MG/DL (ref 0.6–1.3)
DEPRECATED RDW RBC AUTO: 42.8 FL (ref 37–49)
ERYTHROCYTE [DISTWIDTH] IN BLOOD BY AUTOMATED COUNT: 12.4 % (ref 11.7–14.5)
FERRITIN SERPL-MCNC: 51.3 NG/ML
GFR SERPL CREATININE-BSD FRML MDRD: 97 ML/MIN/1.73
GLOBULIN UR ELPH-MCNC: 2.5 GM/DL (ref 1.8–3.5)
GLUCOSE BLD-MCNC: 63 MG/DL (ref 74–124)
HCT VFR BLD AUTO: 44.6 % (ref 34–45)
HGB BLD-MCNC: 14.3 G/DL (ref 11.5–14.9)
IRON 24H UR-MRATE: 105 MCG/DL (ref 37–145)
IRON SATN MFR SERPL: 30 % (ref 14–48)
MCH RBC QN AUTO: 30 PG (ref 27–33)
MCHC RBC AUTO-ENTMCNC: 32.1 G/DL (ref 32–35)
MCV RBC AUTO: 93.7 FL (ref 83–97)
PLATELET # BLD AUTO: 331 10*3/MM3 (ref 150–375)
PMV BLD AUTO: 9.7 FL (ref 8.9–12.1)
POTASSIUM BLD-SCNC: 3.6 MMOL/L (ref 3.5–4.7)
PROT SERPL-MCNC: 7.1 G/DL (ref 6.3–8)
RBC # BLD AUTO: 4.76 10*6/MM3 (ref 3.9–5)
SODIUM BLD-SCNC: 141 MMOL/L (ref 134–145)
TIBC SERPL-MCNC: 350 MCG/DL (ref 249–505)
TRANSFERRIN SERPL-MCNC: 250 MG/DL (ref 200–360)
WBC NRBC COR # BLD: 8.58 10*3/MM3 (ref 4–10)

## 2019-01-23 PROCEDURE — 71260 CT THORAX DX C+: CPT

## 2019-01-23 PROCEDURE — 74177 CT ABD & PELVIS W/CONTRAST: CPT

## 2019-01-23 PROCEDURE — 80053 COMPREHEN METABOLIC PANEL: CPT | Performed by: INTERNAL MEDICINE

## 2019-01-23 PROCEDURE — 85027 COMPLETE CBC AUTOMATED: CPT | Performed by: INTERNAL MEDICINE

## 2019-01-23 PROCEDURE — 25010000002 IOPAMIDOL 61 % SOLUTION: Performed by: INTERNAL MEDICINE

## 2019-01-23 PROCEDURE — 82565 ASSAY OF CREATININE: CPT

## 2019-01-23 PROCEDURE — 82728 ASSAY OF FERRITIN: CPT | Performed by: INTERNAL MEDICINE

## 2019-01-23 PROCEDURE — 84466 ASSAY OF TRANSFERRIN: CPT | Performed by: INTERNAL MEDICINE

## 2019-01-23 PROCEDURE — 0 DIATRIZOATE MEGLUMINE & SODIUM PER 1 ML: Performed by: INTERNAL MEDICINE

## 2019-01-23 PROCEDURE — 83540 ASSAY OF IRON: CPT | Performed by: INTERNAL MEDICINE

## 2019-01-23 PROCEDURE — 36415 COLL VENOUS BLD VENIPUNCTURE: CPT | Performed by: INTERNAL MEDICINE

## 2019-01-23 RX ADMIN — IOPAMIDOL 85 ML: 612 INJECTION, SOLUTION INTRAVENOUS at 10:20

## 2019-01-23 RX ADMIN — DIATRIZOATE MEGLUMINE AND DIATRIZOATE SODIUM 30 ML: 660; 100 LIQUID ORAL; RECTAL at 09:30

## 2019-01-29 ENCOUNTER — OFFICE VISIT (OUTPATIENT)
Dept: ONCOLOGY | Facility: CLINIC | Age: 69
End: 2019-01-29

## 2019-01-29 ENCOUNTER — APPOINTMENT (OUTPATIENT)
Dept: OTHER | Facility: HOSPITAL | Age: 69
End: 2019-01-29

## 2019-01-29 VITALS
OXYGEN SATURATION: 98 % | WEIGHT: 142.4 LBS | TEMPERATURE: 97.9 F | HEART RATE: 68 BPM | BODY MASS INDEX: 25.23 KG/M2 | HEIGHT: 63 IN | SYSTOLIC BLOOD PRESSURE: 127 MMHG | DIASTOLIC BLOOD PRESSURE: 82 MMHG | RESPIRATION RATE: 16 BRPM

## 2019-01-29 DIAGNOSIS — C49.A3 GIST (GASTROINTESTINAL STROMAL TUMOR) OF SMALL BOWEL, MALIGNANT (HCC): Primary | ICD-10-CM

## 2019-01-29 PROCEDURE — G0463 HOSPITAL OUTPT CLINIC VISIT: HCPCS | Performed by: INTERNAL MEDICINE

## 2019-01-29 PROCEDURE — 99214 OFFICE O/P EST MOD 30 MIN: CPT | Performed by: INTERNAL MEDICINE

## 2019-01-29 RX ORDER — BIOTIN 1 MG
TABLET ORAL
COMMUNITY
End: 2019-04-16

## 2019-01-29 NOTE — PROGRESS NOTES
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  HISTORY OF PRESENT ILLNESS:     History of Present Illness     Mrs. Comer is here today for her first outpatient visit to our office.  She had been seen in consultation during her recent admission to [facility] from 2/20/2016 to 2/26/2016.  She was admitted at that time with symptomatic anemia and GI bleeding.  On endoscopy the blood appeared to be coming from the region of the appendix and she underwent a laparoscopic appendectomy with Dr. Meraz on 2/22/2016.  Also at the same procedure she was found to have a tumor within the small bowel and had a partial small bowel resection with reanastomosis.  The pathology from that specimen showed a gastrointestinal stromal tumor (GIST) .  The tumor was 4.5 cm and appeared to be low-grade with 0 mitotic figures per high-powered field.    We discussed with Miss Comer at that time that we would not recommend any postop adjuvant Gleevec therapy as her prognosis was very good with surgery alone.  We did however discuss some follow-up in the office with plans to perform surveillance CT scans for a few years after surgery.    She underwent CT scans on 1/23/2019 which showed no obvious recurrent or metastatic disease.  Her blood counts and iron studies and chemistries all look good at this point.  She reports that she is feeling fine.    She is looking forward to a  cruise in May one of her friends for 3 weeks.    Past Medical History, Past Surgical History, Social History, Family History have been reviewed and are without significant changes except as mentioned.    Review of Systems   Constitutional: Negative for activity change, appetite change, fatigue, fever and unexpected weight change.   HENT: Negative for hearing loss, nosebleeds, trouble swallowing and voice change.    Eyes: Negative for visual disturbance.   Respiratory: Negative for cough, shortness of breath and wheezing.   "  Cardiovascular: Negative for chest pain and palpitations.   Gastrointestinal: Negative for abdominal pain, diarrhea, nausea and vomiting.   Genitourinary: Negative for difficulty urinating, frequency, hematuria and urgency.   Musculoskeletal: Negative for back pain and neck pain.   Skin: Negative for rash.   Neurological: Negative for dizziness, seizures, syncope and headaches.   Hematological: Negative for adenopathy. Does not bruise/bleed easily.   Psychiatric/Behavioral: Negative for behavioral problems. The patient is not nervous/anxious.       A comprehensive 14 point review of systems was performed and was negative except as mentioned.    Medications:  The current medication list was reviewed in the EMR    ALLERGIES:    Allergies   Allergen Reactions   • Penicillins Rash     Rash over whole body       Objective      Vitals:    01/29/19 1010   BP: 127/82   Pulse: 68   Resp: 16   Temp: 97.9 °F (36.6 °C)   SpO2: 98%   Weight: 64.6 kg (142 lb 6.4 oz)   Height: 160 cm (63\")   PainSc: 0-No pain     Current Status 1/29/2019   ECOG score 0       Physical Exam   Constitutional: She is oriented to person, place, and time. She appears well-developed and well-nourished. No distress.   HENT:   Head: Normocephalic.   Eyes: Conjunctivae and EOM are normal. Pupils are equal, round, and reactive to light. No scleral icterus.   Neck: Normal range of motion. Neck supple. No JVD present. No thyromegaly present.   Cardiovascular: Normal rate and regular rhythm. Exam reveals no gallop and no friction rub.   No murmur heard.  Pulmonary/Chest: Effort normal and breath sounds normal. She has no wheezes. She has no rales.   Abdominal: Soft. She exhibits no distension and no mass. There is no tenderness.   Musculoskeletal: Normal range of motion. She exhibits no edema or deformity.   Lymphadenopathy:     She has no cervical adenopathy.   Neurological: She is alert and oriented to person, place, and time. She has normal reflexes. No " cranial nerve deficit.   Skin: Skin is warm and dry. No rash noted. No erythema.   Psychiatric: She has a normal mood and affect. Her behavior is normal. Judgment normal.        RECENT LABS:  Hematology WBC   Date Value Ref Range Status   01/23/2019 8.58 4.00 - 10.00 10*3/mm3 Final     RBC   Date Value Ref Range Status   01/23/2019 4.76 3.90 - 5.00 10*6/mm3 Final     Hemoglobin   Date Value Ref Range Status   01/23/2019 14.3 11.5 - 14.9 g/dL Final     Hematocrit   Date Value Ref Range Status   01/23/2019 44.6 34.0 - 45.0 % Final     Platelets   Date Value Ref Range Status   01/23/2019 331 150 - 375 10*3/mm3 Final              Lab Results   Component Value Date    IRON 105 01/23/2019    TIBC 350 01/23/2019    FERRITIN 51.30 01/23/2019       Lab Results   Component Value Date    GLUCOSE 63 (L) 01/23/2019    BUN 16 01/23/2019    CREATININE 0.60 01/23/2019    EGFRIFNONA 97 01/23/2019    EGFRIFAFRI 101 03/22/2018    BCR 26.2 01/23/2019    CO2 25.7 01/23/2019    CALCIUM 9.9 01/23/2019    PROTENTOTREF 7.2 03/22/2018    ALBUMIN 4.60 01/23/2019    LABIL2 1.9 03/22/2018    AST 23 01/23/2019    ALT 22 01/23/2019     CT OF THE CHEST ABDOMEN AND PELVIS WITH CONTRAST 1/23/2019   CONCLUSION:  No indication of thoracic, abdominal or pelvic metastasis. Small bowel  anastomosis is stable and satisfactory in appearance. No indication of  local recurrence. Again demonstrated bilateral nonobstructing renal  calculi.         Assessment/Plan      1.  Gastrointestinal stromal tumor of the small bowel resected 2/22/2016 by Dr. Meraz with good margins.  The tumor appeared to be low-grade with good prognostic features.  Patient continues to do well and CT scans show no definite evidence of recurrent or metastatic disease now 3 years out from surgery.  2.  Iron deficiency anemia due to GI blood loss. Resolved.     Plan    1. We will schedule a follow-up visit in our office in 12 months with repeat CT scan and labs one week prior to the  visit.                    1/29/2019      CC:

## 2019-02-15 RX ORDER — OMEPRAZOLE 40 MG/1
CAPSULE, DELAYED RELEASE ORAL
Qty: 30 CAPSULE | Refills: 1 | Status: SHIPPED | OUTPATIENT
Start: 2019-02-15 | End: 2019-05-21 | Stop reason: SDUPTHER

## 2019-02-15 RX ORDER — OMEPRAZOLE 40 MG/1
CAPSULE, DELAYED RELEASE ORAL
Qty: 30 CAPSULE | Refills: 1 | Status: SHIPPED | OUTPATIENT
Start: 2019-02-15 | End: 2019-04-16

## 2019-04-11 DIAGNOSIS — Z00.00 HEALTH CARE MAINTENANCE: Primary | ICD-10-CM

## 2019-04-11 DIAGNOSIS — E78.2 MIXED HYPERLIPIDEMIA: ICD-10-CM

## 2019-04-11 DIAGNOSIS — K21.9 GASTROESOPHAGEAL REFLUX DISEASE, ESOPHAGITIS PRESENCE NOT SPECIFIED: ICD-10-CM

## 2019-04-13 LAB
ALBUMIN SERPL-MCNC: 4.6 G/DL (ref 3.5–5.2)
ALBUMIN/GLOB SERPL: 1.8 G/DL
ALP SERPL-CCNC: 83 U/L (ref 39–117)
ALT SERPL-CCNC: 20 U/L (ref 1–33)
APPEARANCE UR: CLEAR
AST SERPL-CCNC: 12 U/L (ref 1–32)
BACTERIA #/AREA URNS HPF: NORMAL /HPF
BASOPHILS # BLD AUTO: 0.06 10*3/MM3 (ref 0–0.2)
BASOPHILS NFR BLD AUTO: 0.8 % (ref 0–1.5)
BILIRUB SERPL-MCNC: 0.5 MG/DL (ref 0.2–1.2)
BILIRUB UR QL STRIP: NEGATIVE
BUN SERPL-MCNC: 14 MG/DL (ref 8–23)
BUN/CREAT SERPL: 20.3 (ref 7–25)
CALCIUM SERPL-MCNC: 10.1 MG/DL (ref 8.6–10.5)
CHLORIDE SERPL-SCNC: 102 MMOL/L (ref 98–107)
CHOLEST SERPL-MCNC: 214 MG/DL (ref 0–200)
CO2 SERPL-SCNC: 26.2 MMOL/L (ref 22–29)
COLOR UR: YELLOW
CREAT SERPL-MCNC: 0.69 MG/DL (ref 0.57–1)
EOSINOPHIL # BLD AUTO: 0.14 10*3/MM3 (ref 0–0.4)
EOSINOPHIL NFR BLD AUTO: 1.9 % (ref 0.3–6.2)
EPI CELLS #/AREA URNS HPF: NORMAL /HPF
ERYTHROCYTE [DISTWIDTH] IN BLOOD BY AUTOMATED COUNT: 11.9 % (ref 12.3–15.4)
GLOBULIN SER CALC-MCNC: 2.6 GM/DL
GLUCOSE SERPL-MCNC: 89 MG/DL (ref 65–99)
GLUCOSE UR QL: NEGATIVE
HCT VFR BLD AUTO: 42.1 % (ref 34–46.6)
HDLC SERPL-MCNC: 88 MG/DL (ref 40–60)
HGB BLD-MCNC: 13.4 G/DL (ref 12–15.9)
HGB UR QL STRIP: NEGATIVE
IMM GRANULOCYTES # BLD AUTO: 0.02 10*3/MM3 (ref 0–0.05)
IMM GRANULOCYTES NFR BLD AUTO: 0.3 % (ref 0–0.5)
KETONES UR QL STRIP: NEGATIVE
LDLC SERPL CALC-MCNC: 112 MG/DL (ref 0–100)
LEUKOCYTE ESTERASE UR QL STRIP: NEGATIVE
LYMPHOCYTES # BLD AUTO: 1.42 10*3/MM3 (ref 0.7–3.1)
LYMPHOCYTES NFR BLD AUTO: 19.7 % (ref 19.6–45.3)
MCH RBC QN AUTO: 29.5 PG (ref 26.6–33)
MCHC RBC AUTO-ENTMCNC: 31.8 G/DL (ref 31.5–35.7)
MCV RBC AUTO: 92.7 FL (ref 79–97)
MICRO URNS: NORMAL
MICRO URNS: NORMAL
MONOCYTES # BLD AUTO: 0.61 10*3/MM3 (ref 0.1–0.9)
MONOCYTES NFR BLD AUTO: 8.5 % (ref 5–12)
NEUTROPHILS # BLD AUTO: 4.95 10*3/MM3 (ref 1.4–7)
NEUTROPHILS NFR BLD AUTO: 68.8 % (ref 42.7–76)
NITRITE UR QL STRIP: NEGATIVE
NRBC BLD AUTO-RTO: 0 /100 WBC (ref 0–0)
PH UR STRIP: 6.5 [PH] (ref 5–7.5)
PLATELET # BLD AUTO: 325 10*3/MM3 (ref 140–450)
POTASSIUM SERPL-SCNC: 4.2 MMOL/L (ref 3.5–5.2)
PROT SERPL-MCNC: 7.2 G/DL (ref 6–8.5)
PROT UR QL STRIP: NEGATIVE
RBC # BLD AUTO: 4.54 10*6/MM3 (ref 3.77–5.28)
RBC #/AREA URNS HPF: NORMAL /HPF
SODIUM SERPL-SCNC: 139 MMOL/L (ref 136–145)
SP GR UR: 1.01 (ref 1–1.03)
TRIGL SERPL-MCNC: 68 MG/DL (ref 0–150)
TSH SERPL DL<=0.005 MIU/L-ACNC: 2.32 MIU/ML (ref 0.27–4.2)
URINALYSIS REFLEX: NORMAL
UROBILINOGEN UR STRIP-MCNC: 0.2 MG/DL (ref 0.2–1)
VLDLC SERPL CALC-MCNC: 13.6 MG/DL
WBC # BLD AUTO: 7.2 10*3/MM3 (ref 3.4–10.8)
WBC #/AREA URNS HPF: NORMAL /HPF

## 2019-04-16 ENCOUNTER — OFFICE VISIT (OUTPATIENT)
Dept: INTERNAL MEDICINE | Facility: CLINIC | Age: 69
End: 2019-04-16

## 2019-04-16 VITALS
DIASTOLIC BLOOD PRESSURE: 84 MMHG | SYSTOLIC BLOOD PRESSURE: 132 MMHG | HEART RATE: 62 BPM | OXYGEN SATURATION: 98 % | WEIGHT: 144 LBS | HEIGHT: 63 IN | BODY MASS INDEX: 25.52 KG/M2 | RESPIRATION RATE: 18 BRPM

## 2019-04-16 DIAGNOSIS — E04.2 MULTIPLE THYROID NODULES: ICD-10-CM

## 2019-04-16 DIAGNOSIS — E78.2 MIXED HYPERLIPIDEMIA: ICD-10-CM

## 2019-04-16 DIAGNOSIS — Z00.00 MEDICARE ANNUAL WELLNESS VISIT, SUBSEQUENT: Primary | ICD-10-CM

## 2019-04-16 PROCEDURE — 99213 OFFICE O/P EST LOW 20 MIN: CPT | Performed by: INTERNAL MEDICINE

## 2019-04-16 PROCEDURE — G0439 PPPS, SUBSEQ VISIT: HCPCS | Performed by: INTERNAL MEDICINE

## 2019-04-16 RX ORDER — TURMERIC ROOT EXTRACT 500 MG
TABLET ORAL
COMMUNITY
End: 2020-06-23

## 2019-04-16 RX ORDER — TERBINAFINE HYDROCHLORIDE 250 MG/1
TABLET ORAL
COMMUNITY
Start: 2019-04-10 | End: 2019-09-13

## 2019-04-16 NOTE — PROGRESS NOTES
Medicare Annual Wellness Visit    Chief Complaint:  Annual Exam (SUB AWV)      History of Present Illness    Ashley Comer is a 68 y.o. female who presents for an Annual Wellness Visit. In addition, we addressed the following health issues:    Mammo: 11/2018   Pap: 11/2018  DEXA: 11/7/2017  C-scope: 7/28/2017  Due this year  Dental Exam: Every 6 months, utd.   Eye Exam: Yearly, utd.   Exercise: house cleaning and stairs, but no formal exercise.     Advanced Care Planning:  Patient has an advance directive - a copy has not been provided. Have asked the patient to send this to us to add to record   Immunization History   Administered Date(s) Administered   • Flu Vaccine High Dose PF 65YR+ 10/01/2017   • Hepatitis A 06/01/2018, 12/20/2018   • Pneumococcal Conjugate 13-Valent (PCV13) 06/22/2015   • Pneumococcal Polysaccharide (PPSV23) 03/16/2016   • Tdap 02/22/2012     Ashley is here for follow up on HLD.  Her lipids still look great.  She is not exercising as much as she used to but is hoping to get this back on track.  She is going on a cruise soon with a friend of hers.    Her last thyroid us was in the fall.  She has been following up regularly with Dr. Olea.   She is due for c-scope this year.  She will set this up.        Review of Systems   Constitution: Negative for chills, fever and malaise/fatigue.   HENT: Negative for hearing loss and sore throat. Hoarse voice: chronic - at baseline.    Eyes: Negative for blurred vision, double vision and visual disturbance.   Cardiovascular: Negative for chest pain, leg swelling and palpitations.   Respiratory: Negative for cough and shortness of breath.    Endocrine: Negative for polydipsia and polyuria.   Hematologic/Lymphatic: Does not bruise/bleed easily.   Skin: Negative for rash and suspicious lesions.   Musculoskeletal: Positive for arthritis and joint pain. Negative for myalgias.   Gastrointestinal: Negative for bloating, abdominal pain, change in bowel habit,  constipation, diarrhea, dysphagia, hematochezia, melena, nausea and vomiting.   Genitourinary: Negative for dysuria and hematuria.   Neurological: Negative for dizziness, headaches and light-headedness.   Psychiatric/Behavioral: Negative for depression. The patient is not nervous/anxious.        Past Medical History:   Diagnosis Date   • Anemia    • Disease of thyroid gland     THYROID NODULES   • Edema    • Esophageal reflux    • History of GI bleed    • History of transfusion 2016    6 UNITS   • Migraine    • Neck pain    • 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       Past Surgical History:   Procedure Laterality Date   • CATARACT EXTRACTION Bilateral    • COLONOSCOPY N/A 2017    Procedure: COLONOSCOPY INTO CECUM WITH POLYPECTOMY;  Surgeon: Mini Meraz MD;  Location: [facility] ENDOSCOPY;  Service:    • ENDOSCOPY N/A 2017    Procedure: ESOPHAGOGASTRODUODENOSCOPY WITH BIOPSIES;  Surgeon: Mini Meraz MD;  Location: [facility] ENDOSCOPY;  Service:    • FOOT SURGERY Right     DEBRIDMENT   • LAPAROSCOPIC APPENDECTOMY     • SMALL INTESTINE SURGERY  2016    for GIST    • THYROID SURGERY Left 1993    thyroid lobectomy    • VITRECTOMY Right 2015       Social History     Socioeconomic History   • Marital status:      Spouse name: Jimmy   • Number of children: 2   • Years of education: COLLEGE   • Highest education level: Not on file   Occupational History   • Occupation: RN      Employer: [employer]   Tobacco Use   • Smoking status: Former Smoker     Packs/day: 1.00     Years: 25.00     Pack years: 25.00     Last attempt to quit:      Years since quittin.3   • Smokeless tobacco: Never Used   Substance and Sexual Activity   • Alcohol use: Yes     Comment: social use   • Drug use: No   • Sexual activity: Defer   Social History Narrative    The patient'  was diagnosed with esophageal  cancer on upper GI endoscopy by Dr. Jose Enrique Yuan.        Family History   Problem Relation Age of Onset   • Dementia Mother    • Hypertension Mother    • Tuberculosis Mother    • Pneumonia Mother    • Prostate cancer Father    • Tuberculosis Father    • Cancer Sister         teratoma, malignant    • Breast cancer Maternal Aunt    • Lung cancer Paternal Aunt    • Brain cancer Paternal Aunt    • Breast cancer Paternal Aunt    • Breast cancer Sister    • Malig Hyperthermia Neg Hx        Allergies   Allergen Reactions   • Penicillins Rash     Rash over whole body       Current Outpatient Medications on File Prior to Visit   Medication Sig Dispense Refill   • acetaminophen (TYLENOL) 500 MG tablet Take 1,000 mg by mouth Every 6 (Six) Hours As Needed for Mild Pain .     • B Complex-C (SUPER B COMPLEX PO) Take 1 tablet by mouth daily. As directed     • calcium citrate-vitamin d (CITRACAL) 200-250 MG-UNIT tablet tablet Take 1 tablet by mouth 2 (Two) Times a Day.     • Cholecalciferol (VITAMIN D3) 1000 UNITS capsule Take 1,000 Units by mouth Daily. Take as directed      • Misc Natural Products (OSTEO BI-FLEX TRIPLE STRENGTH PO) Take 2 tablets by mouth Daily.     • Multiple Vitamins-Minerals (MULTI FOR HER) tablet Take 1 tablet by mouth daily.     • naproxen sodium (ALEVE) 220 MG tablet Take 220 mg by mouth 2 (Two) Times a Day With Meals.     • omeprazole (priLOSEC) 40 MG capsule TAKE ONE CAPSULE BY MOUTH DAILY 30 capsule 1   • Psyllium 500 MG capsule Take 1 capsule by mouth Daily. Take as directed      • terbinafine (lamiSIL) 250 MG tablet      • Turmeric 500 MG tablet      • [DISCONTINUED] Biotin 1000 MCG tablet      • [DISCONTINUED] Coenzyme Q10 (CO Q 10 PO) Take 100 mg by mouth daily.     • [DISCONTINUED] HYALURONIC ACID-VITAMIN C PO Take  by mouth.     • [DISCONTINUED] Multiple Vitamins-Minerals (PRESERVISION AREDS 2 PO) Take  by mouth.     • [DISCONTINUED] omeprazole (priLOSEC) 40 MG capsule TAKE ONE CAPSULE BY MOUTH  "DAILY 30 capsule 1     No current facility-administered medications on file prior to visit.        Patient Active Problem List   Diagnosis   • Migraine   • GIST (gastrointestinal stromal tumor) of small bowel, malignant (CMS/HCC)   • Hoarseness of voice   • Mixed hyperlipidemia   • Rectal polyp   • Neck pain   • Multiple thyroid nodules   • Spinal stenosis in cervical region   • Cervical radiculopathy due to intervertebral disc disorder   • GERD (gastroesophageal reflux disease)       Health Risk Assessment/Depression Screen/Functional and Cognitive Screening:   The patient has completed a Health Risk Assessment & Depression screen. These have been reviewed with them and have been scanned as a separate documents.    Age-appropriate Screening Schedule:  Refer to the list below for future screening recommendations based on patient's age. Orders for these recommended tests are listed in the plan section. The patient has been provided with a written plan.     I have reviewed their problem list, past medical history, family history, social history, and surgical history.     Vitals:    04/16/19 0820   BP: 132/84   Pulse: 62   Resp: 18   SpO2: 98%   Weight: 65.3 kg (144 lb)   Height: 160 cm (62.99\")   PainSc: 0-No pain       Body mass index is 25.51 kg/m².    Physical Exam   Constitutional: She is oriented to person, place, and time. She appears well-developed and well-nourished. No distress.   HENT:   Head: Normocephalic and atraumatic.   Nose: Nose normal.   Mouth/Throat: Oropharynx is clear and moist.   Eyes: Conjunctivae and EOM are normal. Pupils are equal, round, and reactive to light. No scleral icterus.   Neck: Normal range of motion. Neck supple. No thyromegaly present.   Cardiovascular: Normal rate, regular rhythm and normal heart sounds. Exam reveals no gallop and no friction rub.   No murmur heard.  Pulses:       Carotid pulses are 2+ on the right side, and 2+ on the left side.       Femoral pulses are 2+ on " the right side, and 2+ on the left side.       Dorsalis pedis pulses are 2+ on the right side, and 2+ on the left side.        Posterior tibial pulses are 2+ on the right side, and 2+ on the left side.   Pulmonary/Chest: Effort normal and breath sounds normal. No respiratory distress. She has no wheezes. She has no rales. Right breast exhibits no mass and no nipple discharge. Left breast exhibits no mass and no nipple discharge.   Abdominal: Soft. Bowel sounds are normal. She exhibits no distension and no mass. There is no tenderness.   Musculoskeletal: Normal range of motion. She exhibits no edema.     Vascular Status -  Her right foot exhibits no edema. Her left foot exhibits no edema.  Skin Integrity  -  Her right foot skin is intact.Her left foot skin is intact..  Lymphadenopathy:     She has no cervical adenopathy.     She has no axillary adenopathy.        Right: No inguinal and no supraclavicular adenopathy present.        Left: No inguinal and no supraclavicular adenopathy present.   Neurological: She is alert and oriented to person, place, and time. She has normal reflexes. No cranial nerve deficit.   Skin: Skin is warm and dry.   Psychiatric: She has a normal mood and affect. Her speech is normal and behavior is normal. Judgment and thought content normal. Cognition and memory are normal.   Vitals reviewed.      Results for orders placed or performed in visit on 04/11/19   Comprehensive Metabolic Panel   Result Value Ref Range    Glucose 89 65 - 99 mg/dL    BUN 14 8 - 23 mg/dL    Creatinine 0.69 0.57 - 1.00 mg/dL    eGFR Non African Am 84 >60 mL/min/1.73    eGFR African Am 102 >60 mL/min/1.73    BUN/Creatinine Ratio 20.3 7.0 - 25.0    Sodium 139 136 - 145 mmol/L    Potassium 4.2 3.5 - 5.2 mmol/L    Chloride 102 98 - 107 mmol/L    Total CO2 26.2 22.0 - 29.0 mmol/L    Calcium 10.1 8.6 - 10.5 mg/dL    Total Protein 7.2 6.0 - 8.5 g/dL    Albumin 4.60 3.50 - 5.20 g/dL    Globulin 2.6 gm/dL    A/G Ratio 1.8 g/dL     Total Bilirubin 0.5 0.2 - 1.2 mg/dL    Alkaline Phosphatase 83 39 - 117 U/L    AST (SGOT) 12 1 - 32 U/L    ALT (SGPT) 20 1 - 33 U/L   Lipid Panel   Result Value Ref Range    Total Cholesterol 214 (H) 0 - 200 mg/dL    Triglycerides 68 0 - 150 mg/dL    HDL Cholesterol 88 (H) 40 - 60 mg/dL    VLDL Cholesterol 13.6 mg/dL    LDL Cholesterol  112 (H) 0 - 100 mg/dL   TSH Rfx On Abnormal To Free T4   Result Value Ref Range    TSH 2.320 0.270 - 4.200 mIU/mL   Urinalysis With Culture If Indicated - Urine, Clean Catch   Result Value Ref Range    Specific Gravity, UA 1.008 1.005 - 1.030    pH, UA 6.5 5.0 - 7.5    Color, UA Yellow Yellow    Appearance, UA Clear Clear    Leukocytes, UA Negative Negative    Protein Negative Negative/Trace    Glucose, UA Negative Negative    Ketones Negative Negative    Blood, UA Negative Negative    Bilirubin, UA Negative Negative    Urobilinogen, UA 0.2 0.2 - 1.0 mg/dL    Nitrite, UA Negative Negative    Microscopic Examination Comment     Microscopic Examination See below:     Urinalysis Reflex Comment    Microscopic Examination -   Result Value Ref Range    WBC, UA 0-5 0 - 5 /hpf    RBC, UA None seen 0 - 2 /hpf    Epithelial Cells (non renal) None seen 0 - 10 /hpf    Bacteria, UA None seen None seen/Few /hpf   CBC & Differential   Result Value Ref Range    WBC 7.20 3.40 - 10.80 10*3/mm3    RBC 4.54 3.77 - 5.28 10*6/mm3    Hemoglobin 13.4 12.0 - 15.9 g/dL    Hematocrit 42.1 34.0 - 46.6 %    MCV 92.7 79.0 - 97.0 fL    MCH 29.5 26.6 - 33.0 pg    MCHC 31.8 31.5 - 35.7 g/dL    RDW 11.9 (L) 12.3 - 15.4 %    Platelets 325 140 - 450 10*3/mm3    Neutrophil Rel % 68.8 42.7 - 76.0 %    Lymphocyte Rel % 19.7 19.6 - 45.3 %    Monocyte Rel % 8.5 5.0 - 12.0 %    Eosinophil Rel % 1.9 0.3 - 6.2 %    Basophil Rel % 0.8 0.0 - 1.5 %    Neutrophils Absolute 4.95 1.40 - 7.00 10*3/mm3    Lymphocytes Absolute 1.42 0.70 - 3.10 10*3/mm3    Monocytes Absolute 0.61 0.10 - 0.90 10*3/mm3    Eosinophils Absolute 0.14 0.00  - 0.40 10*3/mm3    Basophils Absolute 0.06 0.00 - 0.20 10*3/mm3    Immature Granulocyte Rel % 0.3 0.0 - 0.5 %    Immature Grans Absolute 0.02 0.00 - 0.05 10*3/mm3    nRBC 0.0 0.0 - 0.0 /100 WBC       Assessment & Plan:    Diagnoses and all orders for this visit:    Medicare annual wellness visit, subsequent    Mixed hyperlipidemia    Multiple thyroid nodules    Other orders  -     terbinafine (lamiSIL) 250 MG tablet  -     Turmeric 500 MG tablet        Discussion/Summary  Ashley is here for routine AWV and follow up.  She is up to date on all health maintenance.  She will look into [facility] at her local pharmacy.  Her lipids look great.  All of her labs look good.  Her thyroid is normal.  Continue to work on increasing exercise.  Overall, she is doing well.      Follow Up:  No Follow-up on file.     An After Visit Summary and PPPS with all of these plans were given to the patient.

## 2019-05-21 ENCOUNTER — OFFICE VISIT (OUTPATIENT)
Dept: FAMILY MEDICINE CLINIC | Facility: CLINIC | Age: 69
End: 2019-05-21

## 2019-05-21 VITALS
OXYGEN SATURATION: 97 % | WEIGHT: 146 LBS | DIASTOLIC BLOOD PRESSURE: 76 MMHG | SYSTOLIC BLOOD PRESSURE: 138 MMHG | HEART RATE: 51 BPM | TEMPERATURE: 97.8 F | HEIGHT: 63 IN | RESPIRATION RATE: 20 BRPM | BODY MASS INDEX: 25.87 KG/M2

## 2019-05-21 DIAGNOSIS — K21.9 GASTROESOPHAGEAL REFLUX DISEASE WITHOUT ESOPHAGITIS: Primary | ICD-10-CM

## 2019-05-21 DIAGNOSIS — E04.2 MULTIPLE THYROID NODULES: ICD-10-CM

## 2019-05-21 DIAGNOSIS — M13.0 POLYARTHRITIS: ICD-10-CM

## 2019-05-21 DIAGNOSIS — Z76.89 ENCOUNTER TO ESTABLISH CARE: ICD-10-CM

## 2019-05-21 PROCEDURE — 99213 OFFICE O/P EST LOW 20 MIN: CPT | Performed by: NURSE PRACTITIONER

## 2019-05-21 RX ORDER — OMEPRAZOLE 40 MG/1
40 CAPSULE, DELAYED RELEASE ORAL DAILY
Qty: 90 CAPSULE | Refills: 2 | Status: SHIPPED | OUTPATIENT
Start: 2019-05-21 | End: 2020-03-10

## 2019-05-21 NOTE — PROGRESS NOTES
Subjective   Ashley Comer is a 68 y.o. female.     Chief Complaint   Patient presents with   • Establish Care      HPI Patient is new to me and here to establish care.  Her previous PCP is leaving her practice.  Patient considers herself overall healthy and usually goes to PCP only for well visits.   Just had her AWV and labs done which were all normal.  She is followed by ENT s/p partial left thyroidectomy and bx of right thyroid nodules.   Dx in 2016 with GIST and has been in remission following partial colon resection.  She has had a colonoscopy that was normal last yr and goes 2 years between colonoscopies.  Has polyarthritis due to OA which maily affects her knees, right>left and both hands.  She is stiff in am and her pain is controlled with daily naproxen with food.  She is able to do her ADLs and go up and down stairs in home although this is painful.  Has not been eval by ortho and feels like at the moment she is stable.  She also uses OTC topicals which is helpful.   Has chronic reflux which has been controlled with PPI.  She has been on PPI for several yrs and when she tries to stop she gets recurrence of her sx. She has not tried ranitidine instead of PPI.   Has mammo yearly-due to FH breast cancer-sister had lumpectomy and is in remission and she has FH breast cancer on dad's side.  Her mammos have been negative.    Her spouse  in 2016 after zheng with esophageal cancer.  She has 2 grown children with calvin who live couple hours away. Last yr she moved to [facility] and has made friends and is feeling good about where she is both physically and mentally.    She is a former nurse.       Social History     Tobacco Use   • Smoking status: Former Smoker     Packs/day: 1.00     Years: 25.00     Pack years: 25.00     Last attempt to quit:      Years since quittin.4   • Smokeless tobacco: Never Used   Substance Use Topics   • Alcohol use: Yes     Comment: social use   • Drug use: No  "      The following portions of the patient's history were reviewed and updated as appropriate: allergies, current medications, past family history, past medical history, past social history, past surgical history and problem list.    Review of Systems   Constitutional: Negative for activity change, appetite change, chills, fatigue, fever and unexpected weight change.   HENT: Negative for congestion, ear pain, rhinorrhea, sore throat and trouble swallowing.    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 Negative for pain.   Respiratory: Negative for cough and shortness of breath.    Cardiovascular: Negative for chest pain, palpitations and leg swelling.   Gastrointestinal: Negative for abdominal pain, blood in stool, constipation, diarrhea, nausea and vomiting.        No reflux with PPI, but begins as soon as they are stopped   Endocrine: Negative for cold intolerance, heat intolerance, polydipsia and polyuria.   Genitourinary: Negative for dysuria, frequency, hematuria, urgency and vaginal bleeding.   Musculoskeletal: Positive for back pain (chronic due to DDD-controlled with stretching and NSAID), myalgias and neck pain (chronic due to DDD, controlled with NSAIDs). Negative for arthralgias and joint swelling.   Skin: Negative for rash.   Allergic/Immunologic: Negative for environmental allergies.   Neurological: Negative for dizziness, weakness, numbness and headaches.   Hematological: Negative for adenopathy. Does not bruise/bleed easily.   Psychiatric/Behavioral: Negative for dysphoric mood and sleep disturbance. The patient is not nervous/anxious.        Objective   Blood pressure 138/76, pulse 51, temperature 97.8 °F (36.6 °C), resp. rate 20, height 160 cm (63\"), weight 66.2 kg (146 lb), SpO2 97 %.    Physical Exam   Constitutional: She is oriented to person, place, and time. She appears well-developed and " well-nourished. No distress.   HENT:   Head: Normocephalic and atraumatic.   Right Ear: Tympanic membrane, external ear and ear canal normal.   Left Ear: Tympanic membrane, external ear and ear canal normal.   Mouth/Throat: Uvula is midline and oropharynx is clear and moist.   Eyes: Conjunctivae and EOM are normal. Pupils are equal, round, and reactive to light. Right eye exhibits no discharge. Left eye exhibits no discharge.   Neck: Neck supple. No thyromegaly present.   Cardiovascular: Normal rate, regular rhythm, normal heart sounds and intact distal pulses.   No murmur heard.  Pulmonary/Chest: Effort normal and breath sounds normal.   Abdominal: Soft. Bowel sounds are normal. There is no hepatosplenomegaly. There is no tenderness.   Musculoskeletal: She exhibits no deformity.   Gait smooth and steady  Right knee joint enlarged>left knee joint, without swelling, erythema, TTP  PIP joints right hand enlarged>left, no erythema, swelling, FROM   strength 4/5 b/l     Lymphadenopathy:     She has no cervical adenopathy.   Neurological: She is alert and oriented to person, place, and time. No cranial nerve deficit.   Skin: Skin is warm and dry.   Psychiatric: She has a normal mood and affect.   Nursing note and vitals reviewed.      Assessment   Problem List Items Addressed This Visit        Digestive    GERD (gastroesophageal reflux disease) - Primary    Relevant Medications    omeprazole (priLOSEC) 40 MG capsule       Endocrine    Multiple thyroid nodules      Other Visit Diagnoses     Encounter to establish care        Polyarthritis               Procedures PHQ-9 Total Score: 0   DAMON 7 Total Score: 0         Impression and Plan:Follow up next year, sooner if any problems. PPI refilled, we discussed trial off and possible long term side effects from PPI.   Discussed mgmt options for OA as well as need to keep moving as much as possible to maintain joint mobility.   We reviewed her labs that she had done in April  and she appears to be in very good health, normal CMP, CBC and lipids, normal TSH.    Reinforced healthy diet and exercise. She is UTD on health maintenance.   I have reviewed her chart today as well-most recent OV notes from PCP, oncology, ENT, general surgery, neurosurgery reviewed as well as labs, imaging.     There are no preventive care reminders to display for this patient.    We discussed healthy diet and ways to incorporate activity and exercise into daily life.  Recommendations include trying to get at least 150 mins. of moderate intensity aerobic activity that provide enjoyment to lower risks of CVD disease.          EMR Dragon/Transcription disclaimer:   Much of this encounter note is an electronic transcription/translation of spoken language to printed text. The electronic translation of spoken language may permit erroneous, or at times, nonsensical words or phrases to be inadvertently transcribed; Although I have reviewed the note for such errors, some may still exist.

## 2019-05-21 NOTE — PATIENT INSTRUCTIONS
"Eating Plan for Brain Health  A healthy diet is an important part of overall wellness, and it may help to improve brain function. Eating a healthy diet can lower the risk of having memory-loss diseases such as Alzheimer disease or dementia, or it can slow down the progression of those diseases.  Depending on your overall health and any other health conditions you have, you may need to follow specific diet guidelines. Work with your health care provider or diet and nutrition specialist (dietitian) to find and follow an eating plan that is right for you.  What are tips for following this plan?  Reading food labels  · Check food labels for Daily Value (DV) percentages. Aim for a DV of 5% or less for:  ? Saturated fats.  ? Trans fats.  ? Cholesterol.  ? Salt (sodium).  · Choose whole grains instead of processed grains. The first ingredient in the ingredient list should include words like \"whole grain\" or \"whole wheat.\"  Shopping  · Before you go grocery shopping, plan your meals and make a shopping list.  · Look for lean meats, fish, low-fat dairy products, fruits and vegetables, and whole grains.  · Avoid buying processed or pre-made foods. These are higher in added sugar, fat, and sodium.  Cooking  · Use healthy oils such as olive oil, instead of butter or margarine.  · Avoid frying foods. Healthier ways of cooking include roasting, baking, poaching, and steaming.  · Keep in mind that many healthy foods can be prepared without cooking, such as tuna, nuts, beans, and fruits.  Meal planning  · Plan to eat one or more servings of each of these every day:  ? Green leafy vegetables.  ? Nuts.  ? Whole grains.  · Plan to eat berries, beans, fish, and poultry two or more times every week.  · Avoid red meats, butter, cheese, sweets, and fried foods.  · Eat several small meals throughout the day. For example, eat 6 small meals rather than 3 full-size meals.  General tips  · If you have difficulty chewing or " swallowing:  ? Choose foods that are tender, soft, and moist.  ? Avoid foods that are sticky, hard, dry, chewy, or crunchy.  ? Cut foods into pieces that are smaller than your thumbnail.  · If you are underweight:  ? Add extra protein or calories to meals by adding cream, nut butters, or protein powder to foods and drinks.  ? Drink nutritional supplement shakes as told by your health care provider or dietitian.  · Drink plenty of fluids to keep your urine pale yellow.  · Limit alcohol intake to no more than 1 drink a day for nonpregnant women and 2 drinks a day for men. One drink equals 12 oz of beer, 5 oz of wine, or 1½ oz of hard liquor.  · Take daily vitamin and mineral supplements as told by your health care provider or dietitian.  · Follow daily calorie and nutrient intake goals as told by your dietitian.  What foods are recommended?  · Foods that are high in omega-3s (omega-3 fatty acids). Omega-3s are often found in coldwater fish. They are also found in ground flaxseed, walnuts, edamame, and seaweed.  ? It is recommended that adults eat 8 oz (230 g) or more of fish or other seafood every week.  ? The best kinds of fish for omega-3s are wild salmon, albacore, tuna, sardines, and farmed trout.  ? It is important to bake or grill fish instead of frying it, to avoid unhealthy fats.  · Foods that contain vitamins C, D, and E. These foods include:  ? Avocados.  ? Beans.  ? Nuts and seeds.  ? Green leafy vegetables, like spinach and kale.  ? Cruciferous vegetables, like broccoli or [place] sprouts.  · Cherries and berries, especially blackberries and blueberries. Berries have antioxidants and nutrients that support memory.  · Whole grains, such as oatmeal, whole-grain cereal, whole-grain bread, brown rice, and barley soup.  · Herbs and spices. Cooking with certain herbs and spices, such as turmeric, can help you absorb vitamins.  · Foods in the Mediterranean diet or the DASH (Dietary Approaches to Stop  Hypertension) diet. Your health care provider may recommend eating a combination of these diets. These diets recommend that you:  ? Eat green leafy vegetables, nuts, and whole grains every day.  ? Drink one glass of wine a day.  ? Eat berries, beans, fish, and poultry two or more times every week.  ? Limit red meats, butter, cheese, sweets, fried foods, and fast food to once a week or less.  · Healthy breakfast foods, such as whole-grain cereal, low-fat yogurt, berries or vegetables, and nuts. Eating breakfast every day can boost brain function and concentration for people of all ages.  What foods are not recommended?  · Foods that are high in trans fats, including:  ? Fried foods.  ? Snack foods.  ? Pastries like cookies and donuts, especially the ones that come prepackaged.  · Foods that are high in saturated fats, including:  ? Processed meats, like sausage or deli meat.  ? Red meat.  ? Certain dairy products like butter, margarine, and certain cheeses.  · Processed grains, such as white bread.  · Sweets and fast food. Limit these types of food to once a week or less.  Summary  · Eating a nutritious diet can support brain health as well as overall wellness.  · Choose foods that are high in omega-3 fatty acids, vitamins, and whole grains.  · Avoid foods that contain trans fats and saturated fats, and limit sweets and fast food.  This information is not intended to replace advice given to you by your health care provider. Make sure you discuss any questions you have with your health care provider.  Document Released: 04/23/2018 Document Revised: 04/23/2018 Document Reviewed: 04/23/2018  [employer]AR[employer] Interactive Patient Education © 2019 [employer]

## 2019-09-13 ENCOUNTER — HOSPITAL ENCOUNTER (OUTPATIENT)
Dept: GENERAL RADIOLOGY | Facility: HOSPITAL | Age: 69
Discharge: HOME | End: 2019-09-13
Admitting: NURSE PRACTITIONER

## 2019-09-13 ENCOUNTER — HOSPITAL ENCOUNTER (OUTPATIENT)
Dept: GENERAL RADIOLOGY | Facility: HOSPITAL | Age: 69
Discharge: HOME | End: 2019-09-13

## 2019-09-13 ENCOUNTER — OFFICE VISIT (OUTPATIENT)
Dept: FAMILY MEDICINE CLINIC | Facility: CLINIC | Age: 69
End: 2019-09-13

## 2019-09-13 VITALS
SYSTOLIC BLOOD PRESSURE: 142 MMHG | BODY MASS INDEX: 26.58 KG/M2 | DIASTOLIC BLOOD PRESSURE: 88 MMHG | TEMPERATURE: 98 F | HEART RATE: 69 BPM | WEIGHT: 150 LBS | RESPIRATION RATE: 16 BRPM | OXYGEN SATURATION: 98 % | HEIGHT: 63 IN

## 2019-09-13 DIAGNOSIS — M54.31 SCIATIC NOTCH PAIN, RIGHT: Primary | ICD-10-CM

## 2019-09-13 DIAGNOSIS — Z23 NEED FOR VACCINATION: ICD-10-CM

## 2019-09-13 PROCEDURE — G0008 ADMIN INFLUENZA VIRUS VAC: HCPCS | Performed by: NURSE PRACTITIONER

## 2019-09-13 PROCEDURE — 90653 IIV ADJUVANT VACCINE IM: CPT | Performed by: NURSE PRACTITIONER

## 2019-09-13 PROCEDURE — 72110 X-RAY EXAM L-2 SPINE 4/>VWS: CPT

## 2019-09-13 PROCEDURE — 73502 X-RAY EXAM HIP UNI 2-3 VIEWS: CPT

## 2019-09-13 PROCEDURE — 99214 OFFICE O/P EST MOD 30 MIN: CPT | Performed by: NURSE PRACTITIONER

## 2019-09-13 RX ORDER — CYCLOBENZAPRINE HCL 10 MG
10 TABLET ORAL NIGHTLY PRN
Qty: 30 TABLET | Refills: 0 | Status: SHIPPED | OUTPATIENT
Start: 2019-09-13 | End: 2020-01-30

## 2019-09-13 NOTE — PROGRESS NOTES
Subjective   Ashley Comer is a 68 y.o. female.     Chief Complaint   Patient presents with   • Hip Pain     right hip pain x 2 weeks      HPI  Here for 2 weeks of right hip pain which is new and different. Pain is located over right SI joint.   Comes and goes.  When it occurs it is a sharp pain that lasts a few minutes.  Not sure which movements cause the pain but thinks bending forward and twisting type mvmts cause the pain.  She has had sciatica in past and this does not feel like that.  Lying on her stomach gradually from melania pose improves her pain.  Has tried some tylenol but doesn't seem helpful because pain is intermittent.  She has used ICE which seems to be a little helpful.  Has used biofreeze in past for other pain but not tried it for this.  No traumas or unusual activity in weeks prior to pain which occurred suddenly 2 weeks ago.     Has stopped naproxen since starting tumeric and has noticed a little more pain in her neck than in past.    Has chronic OA of knees right>left, but this has not changed recently or caused any gait changes.     Doing yearly surveillance per Dr. Mejias for GIST which has been in remission. Thinks it will go to every 2 years if next is negative.   Social History     Tobacco Use   • Smoking status: Former Smoker     Packs/day: 1.00     Years: 25.00     Pack years: 25.00     Last attempt to quit:      Years since quittin.7   • Smokeless tobacco: Never Used   Substance Use Topics   • Alcohol use: Yes     Comment: social use   • Drug use: No       The following portions of the patient's history were reviewed and updated as appropriate: allergies, current medications, past family history, past medical history, past social history, past surgical history and problem list.    Review of Systems   Constitutional: Negative for appetite change, chills, fatigue and fever.   HENT: Positive for congestion and sneezing.    Respiratory: Negative for cough and shortness of breath.   "  Cardiovascular: Negative for chest pain and palpitations.   Gastrointestinal: Negative for abdominal pain, blood in stool, constipation, diarrhea, nausea and vomiting.   Genitourinary: Negative for difficulty urinating, dysuria and hematuria.   Musculoskeletal: Positive for neck pain. Negative for back pain and joint swelling.   Allergic/Immunologic: Positive for environmental allergies.   Neurological: Negative for dizziness, weakness and numbness.   Psychiatric/Behavioral: Negative for sleep disturbance. The patient is not nervous/anxious.        Objective   Blood pressure 142/88, pulse 69, temperature 98 °F (36.7 °C), resp. rate 16, height 160 cm (63\"), weight 68 kg (150 lb), SpO2 98 %.    Physical Exam   Constitutional: She is oriented to person, place, and time. She appears well-developed and well-nourished. No distress.   HENT:   Head: Normocephalic and atraumatic.   Eyes: Conjunctivae are normal. Right eye exhibits no discharge. Left eye exhibits no discharge.   Neck: Neck supple.   Cardiovascular: Normal rate and regular rhythm.   Pulmonary/Chest: Effort normal and breath sounds normal.   Abdominal: Soft. Bowel sounds are normal. There is no tenderness.   Musculoskeletal: She exhibits no edema or deformity.   Gait smooth and steady, no antalgia noted  Normal hip and knee exams b/l.  Right knee with some crepitus o/w normal  Lumbosacral spine without obvious deformities and without step off, tenderness.  No paraspinal muscle tightness or tenderness noted  Does have mild tenderness over right SI joint and pinpoints her pain to this location  Minimal pain right straight leg against resistance, does have some SI pain with adduction with right leg extended jordan against resistance  FROM lumbar spine, b/l hips and b/l knees   Lymphadenopathy:     She has no cervical adenopathy.   Neurological: She is alert and oriented to person, place, and time.   Skin: Skin is warm and dry. She is not diaphoretic.   Psychiatric: " She has a normal mood and affect.   Nursing note and vitals reviewed.      Assessment   Problem List Items Addressed This Visit     None      Visit Diagnoses     Sciatic notch pain, right    -  Primary    Relevant Orders    XR Spine Lumbar 4+ View    XR Hip With or Without Pelvis 2 - 3 View Right    Need for vaccination        Relevant Orders    Fluad Quad >65 years (6208-1034) (Completed)           Procedures PHQ-9 Total Score: 0   DAMON 7 Total Score: 0         Impression and Plan:  I think this is most likely right SI joint pain due to muscle spasm, however due to PMH GIST would like to image lumbosacral spine and right hip to make sure no other etiologies.  She also has OA b/l knees so she may have some new DDD lumbosacral spine or hip.    We will give flexeril at HS.  Side effects reviewed.  If she can stay off naproxen she will continue to do without.  She will try biofreeze, yoga stretches, and ICE TID.  If no improvement or any worsening she will let me know.  We could also consider some PT if needed.  She is pretty active.     We discussed s/s requiring emergent tx.   We have given flu vaccine today.        Health Maintenance Due   Topic Date Due   • COLONOSCOPY  07/28/2019   • INFLUENZA VACCINE  08/01/2019              EMR Dragon/Transcription disclaimer:   Much of this encounter note is an electronic transcription/translation of spoken language to printed text. The electronic translation of spoken language may permit erroneous, or at times, nonsensical words or phrases to be inadvertently transcribed; Although I have reviewed the note for such errors, some may still exist.

## 2019-09-17 ENCOUNTER — PATIENT MESSAGE (OUTPATIENT)
Dept: FAMILY MEDICINE CLINIC | Facility: CLINIC | Age: 69
End: 2019-09-17

## 2019-09-17 NOTE — PROGRESS NOTES
Spoke in detail with Patient about results, patient expressed understanding and will follow up as agreed.     Any pending Labs and/or Diagnostic procedures required have been ordered for future release.    Julienne LEONARD

## 2019-11-12 ENCOUNTER — HOSPITAL ENCOUNTER (OUTPATIENT)
Dept: CARDIOLOGY | Facility: HOSPITAL | Age: 69
Discharge: HOME | End: 2019-11-12
Admitting: NURSE PRACTITIONER

## 2019-11-12 ENCOUNTER — HOSPITAL ENCOUNTER (OUTPATIENT)
Dept: GENERAL RADIOLOGY | Facility: HOSPITAL | Age: 69
Discharge: HOME | End: 2019-11-12

## 2019-11-12 ENCOUNTER — OFFICE VISIT (OUTPATIENT)
Dept: FAMILY MEDICINE CLINIC | Facility: CLINIC | Age: 69
End: 2019-11-12

## 2019-11-12 VITALS
WEIGHT: 154 LBS | TEMPERATURE: 97 F | HEIGHT: 63 IN | BODY MASS INDEX: 27.29 KG/M2 | HEART RATE: 90 BPM | RESPIRATION RATE: 20 BRPM | SYSTOLIC BLOOD PRESSURE: 126 MMHG | DIASTOLIC BLOOD PRESSURE: 68 MMHG | OXYGEN SATURATION: 99 %

## 2019-11-12 DIAGNOSIS — M79.89 RIGHT LEG SWELLING: Primary | ICD-10-CM

## 2019-11-12 DIAGNOSIS — G89.29 CHRONIC PAIN OF RIGHT KNEE: ICD-10-CM

## 2019-11-12 DIAGNOSIS — M25.561 CHRONIC PAIN OF RIGHT KNEE: ICD-10-CM

## 2019-11-12 LAB
BH CV LOWER VASCULAR LEFT COMMON FEMORAL AUGMENT: NORMAL
BH CV LOWER VASCULAR LEFT COMMON FEMORAL COMPETENT: NORMAL
BH CV LOWER VASCULAR LEFT COMMON FEMORAL COMPRESS: NORMAL
BH CV LOWER VASCULAR LEFT COMMON FEMORAL PHASIC: NORMAL
BH CV LOWER VASCULAR LEFT COMMON FEMORAL SPONT: NORMAL
BH CV LOWER VASCULAR RIGHT COMMON FEMORAL AUGMENT: NORMAL
BH CV LOWER VASCULAR RIGHT COMMON FEMORAL COMPETENT: NORMAL
BH CV LOWER VASCULAR RIGHT COMMON FEMORAL COMPRESS: NORMAL
BH CV LOWER VASCULAR RIGHT COMMON FEMORAL PHASIC: NORMAL
BH CV LOWER VASCULAR RIGHT COMMON FEMORAL SPONT: NORMAL
BH CV LOWER VASCULAR RIGHT DISTAL FEMORAL COMPRESS: NORMAL
BH CV LOWER VASCULAR RIGHT GASTRONEMIUS COMPRESS: NORMAL
BH CV LOWER VASCULAR RIGHT GREATER SAPH AK COMPRESS: NORMAL
BH CV LOWER VASCULAR RIGHT GREATER SAPH BK COMPRESS: NORMAL
BH CV LOWER VASCULAR RIGHT MID FEMORAL AUGMENT: NORMAL
BH CV LOWER VASCULAR RIGHT MID FEMORAL COMPETENT: NORMAL
BH CV LOWER VASCULAR RIGHT MID FEMORAL COMPRESS: NORMAL
BH CV LOWER VASCULAR RIGHT MID FEMORAL PHASIC: NORMAL
BH CV LOWER VASCULAR RIGHT MID FEMORAL SPONT: NORMAL
BH CV LOWER VASCULAR RIGHT PERONEAL COMPRESS: NORMAL
BH CV LOWER VASCULAR RIGHT POPLITEAL AUGMENT: NORMAL
BH CV LOWER VASCULAR RIGHT POPLITEAL COMPETENT: NORMAL
BH CV LOWER VASCULAR RIGHT POPLITEAL COMPRESS: NORMAL
BH CV LOWER VASCULAR RIGHT POPLITEAL PHASIC: NORMAL
BH CV LOWER VASCULAR RIGHT POPLITEAL SPONT: NORMAL
BH CV LOWER VASCULAR RIGHT POSTERIOR TIBIAL COMPRESS: NORMAL
BH CV LOWER VASCULAR RIGHT PROFUNDA FEMORAL COMPRESS: NORMAL
BH CV LOWER VASCULAR RIGHT PROXIMAL FEMORAL COMPRESS: NORMAL
BH CV LOWER VASCULAR RIGHT SAPHENOFEMORAL JUNCTION COMPRESS: NORMAL

## 2019-11-12 PROCEDURE — 99214 OFFICE O/P EST MOD 30 MIN: CPT | Performed by: NURSE PRACTITIONER

## 2019-11-12 PROCEDURE — 93971 EXTREMITY STUDY: CPT

## 2019-11-12 PROCEDURE — 73562 X-RAY EXAM OF KNEE 3: CPT

## 2019-11-12 NOTE — PROGRESS NOTES
11/12/19 Right LEV duplex preliminary findings are negative for DVT. Preliminary report given to CARMELA Tyler and pt released per her follow up instructions.

## 2019-11-12 NOTE — PROGRESS NOTES
Subjective   Ashley Comer is a 69 y.o. female.     Chief Complaint   Patient presents with   • Ankle Injury     right, swelling, shin pain as well      Patient returns today for ongoing pain and swelling of her right LE. Began after she was here in September for right leg injury  and right hip pain.  Hip pain resolved itself without intervention but she is still having medial lower leg pain and swelling.  She had an abrasion there initially which healed and never had bruise, but it is still painful to palpation.  She also has noticed RLE swelling which has gradually increased, thinks it has been ongoing for at least 1 month. Swelling gradually increases during the day if she is on it, and is lessened at night but never completely resolved. Never had this prior to her injury.  Worried she might have a blood clot. Never had one in past.  No personal or FH clotting disorders.     Also has chronic right knee pain which has worsened since last visit. Denies injury.  Pain is worse when going up and down stairs and is having increasing difficulty going up and down stairs in her home.   Pain is lateral part of knee.  No trauma.  Did have XR appr 12 yrs ago and told she might need knee replacement but she never went back.  Pain is improved with sitting with it extended.  Sometimes feels like it will give out on her.  Does yoga and is active and worries about losing mobility. Prefers more holistic tx when possible. Aleve does help but does not resolve.      Ankle Injury    Pertinent negatives include no numbness.   Lower Extremity Issue   Pertinent negatives include no abdominal pain, chest pain, chills, coughing, fatigue, fever, headaches, nausea, numbness, vomiting or weakness. The symptoms are aggravated by movement, palpation and weight bearing.       Social History     Tobacco Use   • Smoking status: Former Smoker     Packs/day: 1.00     Years: 25.00     Pack years: 25.00     Start date: 2/2/1968     Last attempt to  "quit:      Years since quittin.8   • Smokeless tobacco: Never Used   Substance Use Topics   • Alcohol use: Yes     Comment: occas   • Drug use: No       The following portions of the patient's history were reviewed and updated as appropriate: allergies, current medications, past family history, past medical history, past social history, past surgical history and problem list.    Review of Systems   Constitutional: Negative for chills, fatigue and fever.   Respiratory: Negative for cough and shortness of breath.    Cardiovascular: Positive for leg swelling. Negative for chest pain and palpitations.   Gastrointestinal: Negative for abdominal pain, constipation, diarrhea, nausea and vomiting.   Musculoskeletal: Positive for gait problem (due to right knee pain). Negative for back pain.   Neurological: Negative for dizziness, weakness, numbness and headaches.   Hematological: Does not bruise/bleed easily.       Objective   Blood pressure 126/68, pulse 90, temperature 97 °F (36.1 °C), resp. rate 20, height 160 cm (63\"), weight 69.9 kg (154 lb), SpO2 99 %.  Body mass index is 27.28 kg/m².    Physical Exam   Constitutional: She is oriented to person, place, and time. She appears well-developed and well-nourished. No distress.   HENT:   Head: Normocephalic and atraumatic.   Eyes: Conjunctivae are normal. Right eye exhibits no discharge. Left eye exhibits no discharge.   Cardiovascular: Normal rate, regular rhythm and normal heart sounds.   Pulmonary/Chest: Effort normal and breath sounds normal.   Abdominal: Soft. Bowel sounds are normal. There is no tenderness.   Musculoskeletal: She exhibits no deformity.   Gait smooth and steady  Right leg is mildly swollen when compared to left. Skin intact, no bruising.  Multiple spider veins noted.   Pedal pulse 2+, good cap refill.    Right knee with FROM, crepitus mild with flexion.  +grind test, no pain across joint line, does not appear to have fluid, no erythema, does " not seem unstable, +valgus, negative varus   Neurological: She is alert and oriented to person, place, and time.   Skin: Skin is warm and dry. She is not diaphoretic.   Psychiatric: She has a normal mood and affect.   Nursing note and vitals reviewed.      Assessment   Problem List Items Addressed This Visit     None      Visit Diagnoses     Right leg swelling    -  Primary    Relevant Orders    Duplex Venous Lower Extremity - Right CAR    XR Knee 3 View Right    Chronic pain of right knee        Relevant Orders    Ambulatory Referral to Sports Medicine           Procedures           Impression and Plan:  Doppler RLE secondary to unexplained leg swelling after injury. Was not immobile after fall in September. 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  If + will need anticoagulation which we discussed.     Right knee pain:  Most likely chronic knee  pain is exacerbated  with RLE swelling.  No recent XR,  will get one today and then after discussion of various tx options for suspected DJD she is agreeable to sports medicine for eval and PT if it will be helpful.       Health Maintenance Due   Topic Date Due   • COLONOSCOPY  07/28/2019   • MAMMOGRAM  11/01/2019   • DXA SCAN  11/07/2019              EMR Dragon/Transcription disclaimer:   Much of this encounter note is an electronic transcription/translation of spoken language to printed text. The electronic translation of spoken language may permit erroneous, or at times, nonsensical words or phrases to be inadvertently transcribed; Although I have reviewed the note for such errors, some may still exist.          Answers for HPI/ROS submitted by the patient on 11/11/2019   Lower extremity pain  Incident occurred: more than 1 week ago  Incident location: other  Injury mechanism: other  Pain location: right leg, right knee  Pain quality: aching  Pain - numeric: 3/10  Pain course: intermittent  tingling: No  inability to bear weight: No  loss of motion: No  muscle weakness: No  Foreign body  present: no foreign bodies

## 2019-11-26 ENCOUNTER — APPOINTMENT (OUTPATIENT)
Dept: WOMENS IMAGING | Facility: HOSPITAL | Age: 69
End: 2019-11-26

## 2019-11-26 PROCEDURE — 77063 BREAST TOMOSYNTHESIS BI: CPT | Performed by: RADIOLOGY

## 2019-11-26 PROCEDURE — 77067 SCR MAMMO BI INCL CAD: CPT | Performed by: RADIOLOGY

## 2019-12-05 ENCOUNTER — OFFICE VISIT (OUTPATIENT)
Dept: SPORTS MEDICINE | Facility: CLINIC | Age: 69
End: 2019-12-05

## 2019-12-05 VITALS
WEIGHT: 153 LBS | HEART RATE: 76 BPM | TEMPERATURE: 98.4 F | DIASTOLIC BLOOD PRESSURE: 70 MMHG | OXYGEN SATURATION: 98 % | HEIGHT: 63 IN | BODY MASS INDEX: 27.11 KG/M2 | SYSTOLIC BLOOD PRESSURE: 130 MMHG

## 2019-12-05 DIAGNOSIS — M17.11 ARTHRITIS OF RIGHT KNEE: Primary | ICD-10-CM

## 2019-12-05 DIAGNOSIS — M25.571 CHRONIC PAIN OF RIGHT ANKLE: ICD-10-CM

## 2019-12-05 DIAGNOSIS — G89.29 CHRONIC PAIN OF RIGHT ANKLE: ICD-10-CM

## 2019-12-05 PROCEDURE — 73610 X-RAY EXAM OF ANKLE: CPT | Performed by: FAMILY MEDICINE

## 2019-12-05 PROCEDURE — 99204 OFFICE O/P NEW MOD 45 MIN: CPT | Performed by: FAMILY MEDICINE

## 2019-12-05 NOTE — PROGRESS NOTES
"Ashley is a 69 y.o. year old female evaluation of a problem that is new to this examiner.    Chief Complaint   Patient presents with   • Knee Pain     right side; edema on ankle and shin area       History of Present Illness   HPI   1.  For about the past 12 years patient has been experiencing right knee pain that is mostly anterior and worse with steps, transitioning from seated to standing.  Over the past few weeks she has noted some pain over the lateral knee.  She has not had any locking or swelling.  She has had a few periods of \"almost giving out on me\".  2.  In August hit R lower medial distal tibia after a slip and since then has noted intermittent ankle swelling that gets worse at end of the day \"but no swelling since Saturday\".     I have reviewed the patient's medical, family, and social history in detail and updated the computerized patient record.    Review of Systems   Constitutional: Negative for fever.   Musculoskeletal:        Per HPI   Skin: Negative for wound.   Neurological: Negative for numbness.   All other systems reviewed and are negative.      /70 (BP Location: Left arm, Patient Position: Sitting, Cuff Size: Adult)   Pulse 76   Temp 98.4 °F (36.9 °C) (Oral)   Ht 160 cm (62.99\")   Wt 69.4 kg (153 lb)   LMP  (LMP Unknown)   SpO2 98%   BMI 27.11 kg/m²      Physical Exam   Constitutional: She is oriented to person, place, and time. She appears well-developed and well-nourished.   HENT:   Head: Normocephalic and atraumatic.   Eyes: Conjunctivae and EOM are normal. Pupils are equal, round, and reactive to light.   Pulmonary/Chest: Effort normal.   Musculoskeletal:   Right knee with valgus attitude.  Significant patellofemoral crepitus but no pain.  No effusion erythema.  Patient lacks a few degrees of full extension, negative bounce test.  Patient lacks about 10 to 15 degrees full flexion.    Right ankle with some mild 1+ pitting edema around the lateral ankle.  Otherwise her ankle " exam is normal, no pain with palpation over the medial lateral malleoli.  Normal talar tilt.  Motor 5 out of 5.   Neurological: She is alert and oriented to person, place, and time.   Skin: Skin is warm and dry.   Psychiatric: She has a normal mood and affect. Her behavior is normal.   Vitals reviewed.  Right Ankle X-Ray  Indication: Pain  Views: AP, Lateral, Mortise    Findings:  No fracture  No bony lesion  Soft tissues normal  Normal joint spaces    No prior studies available for comparison.    Reviewed her right knee x-ray images from 11/12/2019 and using a knee model explained to her her anterior pain appears to be coming from significant patellofemoral arthritis and her lateral pain that appears to be coming from moderate to severe lateral compartment arthritis.      Current Outpatient Medications:   •  acetaminophen (TYLENOL) 500 MG tablet, Take 1,000 mg by mouth Every 6 (Six) Hours As Needed for Mild Pain ., Disp: , Rfl:   •  B Complex-C (SUPER B COMPLEX PO), Take 1 tablet by mouth daily. As directed, Disp: , Rfl:   •  calcium citrate-vitamin d (CITRACAL) 200-250 MG-UNIT tablet tablet, Take 1 tablet by mouth 2 (Two) Times a Day., Disp: , Rfl:   •  Cholecalciferol (VITAMIN D3) 1000 UNITS capsule, Take 1,000 Units by mouth Daily. Take as directed , Disp: , Rfl:   •  cyclobenzaprine (FLEXERIL) 10 MG tablet, Take 1 tablet by mouth At Night As Needed for Muscle Spasms., Disp: 30 tablet, Rfl: 0  •  Misc Natural Products (OSTEO BI-FLEX TRIPLE STRENGTH PO), Take 2 tablets by mouth Daily., Disp: , Rfl:   •  Multiple Vitamins-Minerals (MULTI FOR HER) tablet, Take 1 tablet by mouth daily., Disp: , Rfl:   •  naproxen sodium (ALEVE) 220 MG tablet, Take 220 mg by mouth 2 (Two) Times a Day With Meals., Disp: , Rfl:   •  omeprazole (priLOSEC) 40 MG capsule, Take 1 capsule by mouth Daily., Disp: 90 capsule, Rfl: 2  •  Psyllium 500 MG capsule, Take 1 capsule by mouth Daily. Take as directed , Disp: , Rfl:   •  Turmeric 500 MG  tablet, , Disp: , Rfl:      Diagnoses and all orders for this visit:    Arthritis of right knee    Chronic pain of right ankle  -     XR Ankle 3+ View Right       1.  I did discuss with the patient went all nonsurgical treatment options for knee arthritis.  Also given her our handout on nonsurgical options for knee arthritis.  At this time she would like to consider all of these options and she will get back to us if she would like to try any of the nonsurgical options.  She does have a cruise planned to [place] in August 2020 so it is possible that we might consider gel injections with an intra-articular steroid injection on the last gel injection somewhere in June or July.  2.  Right ankle swelling and pain, possible that her previous injury caused a bone bruise and some scar tissue developing that has created to some soft tissue swelling.  It does appear to be dependent and I have recommended compression sock during the day for this.      EMR Dragon/Transcription disclaimer:    Much of this encounter note is an electronic transcription/translation of spoken language to printed text.  The electronic translation of spoken language may permit erroneous, or at times, nonsensical words or phrases to be inadvertently transcribed.  Although I have reviewed the note for such errors some may still exist.

## 2020-01-20 ENCOUNTER — HOSPITAL ENCOUNTER (OUTPATIENT)
Dept: CT IMAGING | Facility: HOSPITAL | Age: 70
Discharge: HOME | End: 2020-01-20
Attending: INTERNAL MEDICINE | Admitting: INTERNAL MEDICINE

## 2020-01-20 DIAGNOSIS — C49.A3 GIST (GASTROINTESTINAL STROMAL TUMOR) OF SMALL BOWEL, MALIGNANT (HCC): ICD-10-CM

## 2020-01-20 LAB
ALBUMIN SERPL-MCNC: 4.4 G/DL (ref 3.5–5.2)
ALBUMIN/GLOB SERPL: 1.5 G/DL
ALP SERPL-CCNC: 72 U/L (ref 39–117)
ALT SERPL W P-5'-P-CCNC: 16 U/L (ref 1–33)
ANION GAP SERPL CALCULATED.3IONS-SCNC: 15 MMOL/L (ref 5–15)
AST SERPL-CCNC: 20 U/L (ref 1–32)
BASOPHILS # BLD AUTO: 0.06 10*3/MM3 (ref 0–0.2)
BASOPHILS NFR BLD AUTO: 0.9 % (ref 0–1.5)
BILIRUB SERPL-MCNC: 0.2 MG/DL (ref 0.2–1.2)
BUN BLD-MCNC: 19 MG/DL (ref 8–23)
BUN/CREAT SERPL: 28.4 (ref 7–25)
CALCIUM SPEC-SCNC: 9.6 MG/DL (ref 8.6–10.5)
CHLORIDE SERPL-SCNC: 97 MMOL/L (ref 98–107)
CO2 SERPL-SCNC: 26 MMOL/L (ref 22–29)
CREAT BLD-MCNC: 0.67 MG/DL (ref 0.57–1)
CREAT BLDA-MCNC: 0.6 MG/DL (ref 0.6–1.3)
DEPRECATED RDW RBC AUTO: 38.3 FL (ref 37–54)
EOSINOPHIL # BLD AUTO: 0.14 10*3/MM3 (ref 0–0.4)
EOSINOPHIL NFR BLD AUTO: 2.1 % (ref 0.3–6.2)
ERYTHROCYTE [DISTWIDTH] IN BLOOD BY AUTOMATED COUNT: 12.9 % (ref 12.3–15.4)
GFR SERPL CREATININE-BSD FRML MDRD: 87 ML/MIN/1.73
GLOBULIN UR ELPH-MCNC: 2.9 GM/DL
GLUCOSE BLD-MCNC: 87 MG/DL (ref 65–99)
HCT VFR BLD AUTO: 37.9 % (ref 34–46.6)
HGB BLD-MCNC: 12.3 G/DL (ref 12–15.9)
IMM GRANULOCYTES # BLD AUTO: 0.01 10*3/MM3 (ref 0–0.05)
IMM GRANULOCYTES NFR BLD AUTO: 0.1 % (ref 0–0.5)
LYMPHOCYTES # BLD AUTO: 1.09 10*3/MM3 (ref 0.7–3.1)
LYMPHOCYTES NFR BLD AUTO: 16.1 % (ref 19.6–45.3)
MCH RBC QN AUTO: 26.8 PG (ref 26.6–33)
MCHC RBC AUTO-ENTMCNC: 32.5 G/DL (ref 31.5–35.7)
MCV RBC AUTO: 82.6 FL (ref 79–97)
MONOCYTES # BLD AUTO: 0.56 10*3/MM3 (ref 0.1–0.9)
MONOCYTES NFR BLD AUTO: 8.3 % (ref 5–12)
NEUTROPHILS # BLD AUTO: 4.92 10*3/MM3 (ref 1.7–7)
NEUTROPHILS NFR BLD AUTO: 72.5 % (ref 42.7–76)
NRBC BLD AUTO-RTO: 0 /100 WBC (ref 0–0.2)
PLATELET # BLD AUTO: 379 10*3/MM3 (ref 140–450)
PMV BLD AUTO: 9.5 FL (ref 6–12)
POTASSIUM BLD-SCNC: 4.1 MMOL/L (ref 3.5–5.2)
PROT SERPL-MCNC: 7.3 G/DL (ref 6–8.5)
RBC # BLD AUTO: 4.59 10*6/MM3 (ref 3.77–5.28)
SODIUM BLD-SCNC: 138 MMOL/L (ref 136–145)
WBC NRBC COR # BLD: 6.78 10*3/MM3 (ref 3.4–10.8)

## 2020-01-20 PROCEDURE — 82565 ASSAY OF CREATININE: CPT

## 2020-01-20 PROCEDURE — 85025 COMPLETE CBC W/AUTO DIFF WBC: CPT | Performed by: INTERNAL MEDICINE

## 2020-01-20 PROCEDURE — 80053 COMPREHEN METABOLIC PANEL: CPT | Performed by: INTERNAL MEDICINE

## 2020-01-20 PROCEDURE — 25010000002 IOPAMIDOL 61 % SOLUTION: Performed by: INTERNAL MEDICINE

## 2020-01-20 PROCEDURE — 74177 CT ABD & PELVIS W/CONTRAST: CPT

## 2020-01-20 PROCEDURE — 71260 CT THORAX DX C+: CPT

## 2020-01-20 RX ADMIN — IOPAMIDOL 85 ML: 612 INJECTION, SOLUTION INTRAVENOUS at 10:12

## 2020-01-30 ENCOUNTER — APPOINTMENT (OUTPATIENT)
Dept: LAB | Facility: HOSPITAL | Age: 70
End: 2020-01-30

## 2020-01-30 ENCOUNTER — OFFICE VISIT (OUTPATIENT)
Dept: ONCOLOGY | Facility: CLINIC | Age: 70
End: 2020-01-30

## 2020-01-30 VITALS
DIASTOLIC BLOOD PRESSURE: 77 MMHG | OXYGEN SATURATION: 96 % | WEIGHT: 151.1 LBS | SYSTOLIC BLOOD PRESSURE: 135 MMHG | RESPIRATION RATE: 16 BRPM | BODY MASS INDEX: 26.77 KG/M2 | TEMPERATURE: 97.7 F | HEIGHT: 63 IN | HEART RATE: 68 BPM

## 2020-01-30 DIAGNOSIS — C49.A3 GIST (GASTROINTESTINAL STROMAL TUMOR) OF SMALL BOWEL, MALIGNANT (HCC): Primary | ICD-10-CM

## 2020-01-30 PROCEDURE — 99214 OFFICE O/P EST MOD 30 MIN: CPT | Performed by: INTERNAL MEDICINE

## 2020-01-30 NOTE — PROGRESS NOTES
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  HISTORY OF PRESENT ILLNESS:     History of Present Illness     Mrs. Comer is a 69 y.o. female here today for annual follow-up visit to our office.  She underwent surveillance CT scans 1/20/2020 which was stable and showing no evidence of recurrent disease.    She looks great in the office today with no new symptoms.  She is planning a cruise to [place] later in the year.    Past Oncologic History  She had been seen in consultation during her admission to [facility] from 2/20/2016 to 2/26/2016.  She was admitted at that time with symptomatic anemia and GI bleeding.  On endoscopy the blood appeared to be coming from the region of the appendix and she underwent a laparoscopic appendectomy with Dr. Meraz on 2/22/2016.  Also at the same procedure she was found to have a tumor within the small bowel and had a partial small bowel resection with reanastomosis.  The pathology from that specimen showed a gastrointestinal stromal tumor (GIST) .  The tumor was 4.5 cm and appeared to be low-grade with 0 mitotic figures per high-powered field.    We discussed with Miss Comer at that time that we would not recommend any postop adjuvant Gleevec therapy as her prognosis was very good with surgery alone.  We did however discuss some follow-up in the office with plans to perform surveillance CT scans for a few years after surgery.        Past Medical History, Past Surgical History, Social History, Family History have been reviewed and are without significant changes except as mentioned.    Review of Systems   Constitutional: Negative for activity change, appetite change, fatigue, fever and unexpected weight change.   HENT: Negative for hearing loss, nosebleeds, trouble swallowing and voice change.    Eyes: Negative for visual disturbance.   Respiratory: Negative for cough, shortness of breath and wheezing.    Cardiovascular: Negative for chest  "pain and palpitations.   Gastrointestinal: Negative for abdominal pain, diarrhea, nausea and vomiting.   Genitourinary: Negative for difficulty urinating, frequency, hematuria and urgency.   Musculoskeletal: Negative for back pain and neck pain.   Skin: Negative for rash.   Neurological: Negative for dizziness, seizures, syncope and headaches.   Hematological: Negative for adenopathy. Does not bruise/bleed easily.   Psychiatric/Behavioral: Negative for behavioral problems. The patient is not nervous/anxious.       A comprehensive 14 point review of systems was performed and was negative except as mentioned.    Medications:  The current medication list was reviewed in the EMR    ALLERGIES:    Allergies   Allergen Reactions   • Penicillins Rash     Rash over whole body       Objective      Vitals:    01/30/20 1426   BP: 135/77   Pulse: 68   Resp: 16   Temp: 97.7 °F (36.5 °C)   TempSrc: Oral   SpO2: 96%   Weight: 68.5 kg (151 lb 1.6 oz)   Height: 160 cm (62.99\")   PainSc: 0-No pain     Current Status 1/30/2020   ECOG score 0       Physical Exam   Constitutional: She is oriented to person, place, and time. She appears well-developed and well-nourished. No distress.   HENT:   Head: Normocephalic.   Eyes: Pupils are equal, round, and reactive to light. Conjunctivae and EOM are normal. No scleral icterus.   Neck: Normal range of motion. Neck supple. No JVD present. No thyromegaly present.   Cardiovascular: Normal rate and regular rhythm. Exam reveals no gallop and no friction rub.   No murmur heard.  Pulmonary/Chest: Effort normal and breath sounds normal. She has no wheezes. She has no rales.   Abdominal: Soft. She exhibits no distension and no mass. There is no tenderness.   Musculoskeletal: Normal range of motion. She exhibits no edema or deformity.   Lymphadenopathy:     She has no cervical adenopathy.   Neurological: She is alert and oriented to person, place, and time. She has normal reflexes. No cranial nerve " deficit.   Skin: Skin is warm and dry. No rash noted. No erythema.   Psychiatric: She has a normal mood and affect. Her behavior is normal. Judgment normal.        RECENT LABS:  Hematology WBC   Date Value Ref Range Status   01/20/2020 6.78 3.40 - 10.80 10*3/mm3 Final   04/12/2019 7.20 3.40 - 10.80 10*3/mm3 Final     RBC   Date Value Ref Range Status   01/20/2020 4.59 3.77 - 5.28 10*6/mm3 Final   04/12/2019 4.54 3.77 - 5.28 10*6/mm3 Final     Hemoglobin   Date Value Ref Range Status   01/20/2020 12.3 12.0 - 15.9 g/dL Final     Hematocrit   Date Value Ref Range Status   01/20/2020 37.9 34.0 - 46.6 % Final     Platelets   Date Value Ref Range Status   01/20/2020 379 140 - 450 10*3/mm3 Final              Lab Results   Component Value Date    IRON 105 01/23/2019    TIBC 350 01/23/2019    FERRITIN 51.30 01/23/2019       Lab Results   Component Value Date    GLUCOSE 87 01/20/2020    BUN 19 01/20/2020    CREATININE 0.67 01/20/2020    CREATININE 0.60 01/20/2020    EGFRIFNONA 87 01/20/2020    EGFRIFAFRI 102 04/12/2019    BCR 28.4 (H) 01/20/2020    CO2 26.0 01/20/2020    CALCIUM 9.6 01/20/2020    PROTENTOTREF 7.2 04/12/2019    ALBUMIN 4.40 01/20/2020    LABIL2 1.8 04/12/2019    AST 20 01/20/2020    ALT 16 01/20/2020     CT OF THE CHEST ABDOMEN AND PELVIS WITH CONTRAST 1/20/2020   IMPRESSION:  1. Stable appearance of the chest, abdomen and pelvis since the previous  study of 01/23/2019.   2. No pathologic lymphadenopathy or other evidence of metastatic disease  is seen.         Assessment/Plan      1.  Gastrointestinal stromal tumor of the small bowel resected 2/22/2016 by Dr. Meraz with good margins.  The tumor appeared to be low-grade with good prognostic features.  Patient continues to do well and CT scans show no definite evidence of recurrent or metastatic disease now 4 years out from surgery.  2.  Iron deficiency anemia due to GI blood loss. Resolved.     Plan    1. We will schedule a follow-up visit in our office in 12  months with repeat CT scan and labs one week prior to the visit.                    1/30/2020      CC:

## 2020-03-10 DIAGNOSIS — K21.9 GASTROESOPHAGEAL REFLUX DISEASE WITHOUT ESOPHAGITIS: ICD-10-CM

## 2020-03-10 RX ORDER — OMEPRAZOLE 40 MG/1
CAPSULE, DELAYED RELEASE ORAL
Qty: 90 CAPSULE | Refills: 1 | Status: SHIPPED | OUTPATIENT
Start: 2020-03-10 | End: 2020-09-08

## 2020-06-01 ENCOUNTER — OFFICE VISIT (OUTPATIENT)
Dept: FAMILY MEDICINE CLINIC | Facility: CLINIC | Age: 70
End: 2020-06-01

## 2020-06-01 VITALS
DIASTOLIC BLOOD PRESSURE: 73 MMHG | TEMPERATURE: 97.8 F | OXYGEN SATURATION: 98 % | WEIGHT: 138 LBS | HEIGHT: 63 IN | HEART RATE: 58 BPM | SYSTOLIC BLOOD PRESSURE: 145 MMHG | BODY MASS INDEX: 24.45 KG/M2 | RESPIRATION RATE: 16 BRPM

## 2020-06-01 DIAGNOSIS — E78.00 PURE HYPERCHOLESTEROLEMIA: ICD-10-CM

## 2020-06-01 DIAGNOSIS — M13.0 POLYARTHRITIS: ICD-10-CM

## 2020-06-01 DIAGNOSIS — Z00.00 MEDICARE ANNUAL WELLNESS VISIT, SUBSEQUENT: Primary | ICD-10-CM

## 2020-06-01 DIAGNOSIS — E04.2 MULTIPLE THYROID NODULES: ICD-10-CM

## 2020-06-01 DIAGNOSIS — R07.89 OTHER CHEST PAIN: ICD-10-CM

## 2020-06-01 DIAGNOSIS — K21.9 GASTROESOPHAGEAL REFLUX DISEASE WITHOUT ESOPHAGITIS: ICD-10-CM

## 2020-06-01 PROCEDURE — 99213 OFFICE O/P EST LOW 20 MIN: CPT | Performed by: NURSE PRACTITIONER

## 2020-06-01 PROCEDURE — G0439 PPPS, SUBSEQ VISIT: HCPCS | Performed by: NURSE PRACTITIONER

## 2020-06-01 PROCEDURE — 93000 ELECTROCARDIOGRAM COMPLETE: CPT | Performed by: NURSE PRACTITIONER

## 2020-06-01 RX ORDER — MULTIVIT WITH MINERALS/LUTEIN
TABLET ORAL
COMMUNITY
End: 2022-04-28

## 2020-06-01 RX ORDER — CYCLOSPORINE 0.5 MG/ML
EMULSION OPHTHALMIC
COMMUNITY
Start: 2020-02-27 | End: 2022-09-22

## 2020-06-01 NOTE — PROGRESS NOTES
Subjective   Ashley Comer is a 69 y.o. female.     Chief Complaint   Patient presents with   • Medicare Wellness-subsequent     AWV      HPI  New problem. Burning in chest started 2-3 months ago.  Last appr 30 mins and feels like breathing fire-occurs daily and is 2-3 x day.  Not taking anything for it.  Never had it before.  Takes MVI with food always. No dietary or supplement changes.  Has appt for monitoring of thyroid in next month.   Otherwise has been well feeling.    Gets lonely at times, but feels like this is normal and that she zonia with it well.  She misses her spouse more at times like this, but feels she has good support.       Social History     Tobacco Use   • Smoking status: Former Smoker     Packs/day: 1.00     Years: 25.00     Pack years: 25.00     Start date: 1968     Last attempt to quit:      Years since quittin.4   • Smokeless tobacco: Never Used   Substance Use Topics   • Alcohol use: Yes     Comment: occas   • Drug use: No       The following portions of the patient's history were reviewed and updated as appropriate: allergies, current medications, past family history, past medical history, past social history, past surgical history and problem list.    Review of Systems   Constitutional: Positive for activity change. Negative for appetite change, chills, fatigue and unexpected weight change.   HENT: Negative for sore throat, trouble swallowing and voice change.    Eyes: Negative for pain and visual disturbance.   Respiratory: Negative for cough and shortness of breath.    Cardiovascular: Positive for chest pain. Negative for palpitations and leg swelling.   Gastrointestinal: Negative for abdominal distention, abdominal pain, blood in stool, constipation, diarrhea, nausea and vomiting.   Endocrine: Negative for cold intolerance and heat intolerance.   Genitourinary: Negative for difficulty urinating, frequency, urgency and vaginal bleeding.   Musculoskeletal: Negative for  "arthralgias and myalgias.   Skin: Negative for rash and wound.   Neurological: Negative for dizziness, weakness, light-headedness and headaches.   Psychiatric/Behavioral: Positive for dysphoric mood (feels blue at times and thinks she is handling it well). Negative for sleep disturbance. The patient is not nervous/anxious.        Objective   Blood pressure 145/73, pulse 58, temperature 97.8 °F (36.6 °C), resp. rate 16, height 160 cm (62.99\"), weight 62.6 kg (138 lb), SpO2 98 %.  Body mass index is 24.45 kg/m².    Physical Exam   Constitutional: She is oriented to person, place, and time. She appears well-developed and well-nourished. No distress.   HENT:   Head: Normocephalic and atraumatic.   Right Ear: Tympanic membrane, external ear and ear canal normal.   Left Ear: Tympanic membrane, external ear and ear canal normal.   Eyes: Conjunctivae are normal. Right eye exhibits no discharge. Left eye exhibits no discharge.   Neck: Neck supple. No thyromegaly present.   Cardiovascular: Regular rhythm, normal heart sounds and intact distal pulses. Bradycardia present.   No murmur heard.  Pulmonary/Chest: Effort normal and breath sounds normal.   Abdominal: Soft. Bowel sounds are normal. She exhibits no mass. There is no tenderness. There is no rebound and no guarding.   Musculoskeletal: She exhibits no deformity.   Gait smooth and steady   Neurological: She is alert and oriented to person, place, and time.   Skin: Skin is warm and dry. She is not diaphoretic.   Psychiatric: She has a normal mood and affect. Her behavior is normal.   Appears open and forthcoming  Seems to have good support and perspective   Nursing note and vitals reviewed.      Assessment   Problem List Items Addressed This Visit        Digestive    GERD (gastroesophageal reflux disease)    Relevant Orders    Comprehensive metabolic panel (Completed)    CBC and Differential (Completed)       Endocrine    Multiple thyroid nodules    Relevant Orders    TSH " Rfx On Abnormal To Free T4 (Completed)      Other Visit Diagnoses     Medicare annual wellness visit, subsequent    -  Primary    Other chest pain        Relevant Orders    ECG 12 Lead (Completed)    Polyarthritis        Pure hypercholesterolemia        Relevant Orders    Lipid Panel With LDL/HDL Ratio (Completed)             ECG 12 Lead  Date/Time: 6/1/2020 2:08 PM  Performed by: Fannie Aguirre APRN  Authorized by: Fannie Aguirre APRN   Comparison: compared with previous ECG from 8/30/2016  Comparison to previous ECG: Previous EKG was NSR with HR of 64  Rhythm: sinus bradycardia  Rate: bradycardic  Conduction: conduction normal  QRS axis: normal  Other: no other findings    Clinical impression: non-specific ECG                   Impression and Plan:  Burning in chest:  New problem req. W/U. EKG checked to due to chest pain showing SB, does not appear to be cause of pain.  Would consider reflux control trial to see if this is helpful.  If not, will need further w/u.  Short f/u to make sure sx resolved.    UTD on health maintenance, other than dexascan-last done 2018. Prefer to wait until fall due to corona virus exp risk.      Health Maintenance Due   Topic Date Due   • DXA SCAN  11/07/2019              EMR Dragon/Transcription disclaimer:   Much of this encounter note is an electronic transcription/translation of spoken language to printed text. The electronic translation of spoken language may permit erroneous, or at times, nonsensical words or phrases to be inadvertently transcribed; Although I have reviewed the note for such errors, some may still exist.        Answers for HPI/ROS submitted by the patient on 6/1/2020   What is the primary reason for your visit?: Physical

## 2020-06-01 NOTE — PROGRESS NOTES
The ABCs of the Annual Wellness Visit  Subsequent Medicare Wellness Visit    Chief Complaint   Patient presents with   • Medicare Wellness-subsequent     AWV       Subjective   History of Present Illness:  Ashley Comer is a 69 y.o. female who presents for a Subsequent Medicare Wellness Visit.    HEALTH RISK ASSESSMENT    Recent Hospitalizations:  No hospitalization(s) within the last year.    Current Medical Providers:  Patient Care Team:  Fannie Aguirre APRN as PCP - General (Nurse Practitioner)  Omega Wu DPM as PCP - Claims Attributed  Vargas Mejias MD as Consulting Physician (Hematology and Oncology)  Mini Meraz MD as Referring Physician (General Surgery)  Sacha Porter MD as Consulting Physician (Obstetrics and Gynecology)    Smoking Status:  Social History     Tobacco Use   Smoking Status Former Smoker   • Packs/day: 1.00   • Years: 25.00   • Pack years: 25.00   • Start date: 1968   • Last attempt to quit:    • Years since quittin.4   Smokeless Tobacco Never Used       Alcohol Consumption:  Social History     Substance and Sexual Activity   Alcohol Use Yes    Comment: occas       Depression Screen:   PHQ-2/PHQ-9 Depression Screening 2019   Little interest or pleasure in doing things 0   Feeling down, depressed, or hopeless 0   Trouble falling or staying asleep, or sleeping too much 0   Feeling tired or having little energy 0   Poor appetite or overeating 0   Feeling bad about yourself - or that you are a failure or have let yourself or your family down 0   Trouble concentrating on things, such as reading the newspaper or watching television 0   Moving or speaking so slowly that other people could have noticed. Or the opposite - being so fidgety or restless that you have been moving around a lot more than usual 0   Thoughts that you would be better off dead, or of hurting yourself in some way 0   Total Score 0   If you checked off any problems, how  difficult have these problems made it for you to do your work, take care of things at home, or get along with other people? -       Fall Risk Screen:  SUSI Fall Risk Assessment was completed, and patient is at LOW risk for falls.Assessment completed on:6/1/2020    Health Habits and Functional and Cognitive Screening:  Functional & Cognitive Status 6/1/2020   Do you have difficulty preparing food and eating? No   Do you have difficulty bathing yourself, getting dressed or grooming yourself? No   Do you have difficulty using the toilet? No   Do you have difficulty moving around from place to place? No   Do you have trouble with steps or getting out of a bed or a chair? No   Current Diet Well Balanced Diet   Dental Exam Up to date   Eye Exam Up to date   Exercise (times per week) 5 times per week   Current Exercise Activities Include Walking   Do you need help using the phone?  No   Are you deaf or do you have serious difficulty hearing?  No   Do you need help with transportation? No   Do you need help shopping? No   Do you need help preparing meals?  No   Do you need help with housework?  No   Do you need help with laundry? No   Do you need help taking your medications? No   Do you need help managing money? No   Do you ever drive or ride in a car without wearing a seat belt? No   Have you felt unusual stress, anger or loneliness in the last month? No   Who do you live with? Alone   If you need help, do you have trouble finding someone available to you? No   Have you been bothered in the last four weeks by sexual problems? -   Do you have difficulty concentrating, remembering or making decisions? No         Does the patient have evidence of cognitive impairment? No    Asprin use counseling:Does not need ASA (and currently is not on it)    Age-appropriate Screening Schedule:  Refer to the list below for future screening recommendations based on patient's age, sex and/or medical conditions. Orders for these recommended  tests are listed in the plan section. The patient has been provided with a written plan.    Health Maintenance   Topic Date Due   • COLONOSCOPY  07/28/2019   • MAMMOGRAM  11/01/2019   • DXA SCAN  11/07/2019   • LIPID PANEL  04/12/2020   • INFLUENZA VACCINE  08/01/2020   • TDAP/TD VACCINES (2 - Td) 02/22/2022   • ZOSTER VACCINE  Discontinued          The following portions of the patient's history were reviewed and updated as appropriate: allergies, current medications, past family history, past medical history, past social history, past surgical history and problem list.    Outpatient Medications Prior to Visit   Medication Sig Dispense Refill   • acetaminophen (TYLENOL) 500 MG tablet Take 1,000 mg by mouth Every 6 (Six) Hours As Needed for Mild Pain .     • ascorbic acid (VITAMIN C) 1000 MG tablet      • B Complex-C (SUPER B COMPLEX PO) Take 1 tablet by mouth daily. As directed     • calcium citrate-vitamin d (CITRACAL) 200-250 MG-UNIT tablet tablet Take 1 tablet by mouth 2 (Two) Times a Day.     • Cholecalciferol (VITAMIN D3) 1000 UNITS capsule Take 1,000 Units by mouth Daily. Take as directed      • Multiple Vitamins-Minerals (MULTI FOR HER) tablet Take 1 tablet by mouth daily.     • naproxen sodium (ALEVE) 220 MG tablet Take 220 mg by mouth 2 (Two) Times a Day With Meals.     • omeprazole (priLOSEC) 40 MG capsule TAKE ONE CAPSULE BY MOUTH DAILY 90 capsule 1   • RESTASIS 0.05 % ophthalmic emulsion      • Turmeric 500 MG tablet        No facility-administered medications prior to visit.        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 Patient has an advance directive (not in EMR), copy requested.    Review of Systems    Compared to one year ago, the patient feels her physical health is better.  Compared to one year ago, the patient feels her mental health is worse.    Reviewed chart for potential of high risk medication in the elderly: not applicable  Reviewed chart for potential of harmful drug interactions in the elderly:not applicable    Objective         Vitals:    06/01/20 1323   BP: 145/73   Pulse: 58   Resp: 16   Temp: 97.8 °F (36.6 °C)   SpO2: 98%   Weight: 62.6 kg (138 lb)   Height: 160 cm (62.99\")   PainSc: 0-No pain       Body mass index is 24.45 kg/m².  Discussed the patient's BMI with her. The BMI is in the acceptable range.    Physical Exam          Assessment/Plan   Medicare Risks and Personalized Health Plan  CMS Preventative Services Quick Reference  Advance Directive Discussion  Breast Cancer/Mammogram Screening  Osteoprorosis Risk    The above risks/problems have been discussed with the patient.  Pertinent information has been shared with the patient in the After Visit Summary.  Follow up plans and orders are seen below in the Assessment/Plan Section.    There are no diagnoses linked to this encounter.  Follow Up:  No follow-ups on file.     An After Visit Summary and PPPS were given to the patient.             Answers for HPI/ROS submitted by the patient on 6/1/2020   What is the primary reason for your visit?: Physical    "

## 2020-06-02 LAB
ALBUMIN SERPL-MCNC: 4.6 G/DL (ref 3.5–5.2)
ALBUMIN/GLOB SERPL: 1.9 G/DL
ALP SERPL-CCNC: 78 U/L (ref 39–117)
ALT SERPL-CCNC: 22 U/L (ref 1–33)
AST SERPL-CCNC: 17 U/L (ref 1–32)
BASOPHILS # BLD AUTO: 0.07 10*3/MM3 (ref 0–0.2)
BASOPHILS NFR BLD AUTO: 0.9 % (ref 0–1.5)
BILIRUB SERPL-MCNC: 0.2 MG/DL (ref 0.2–1.2)
BUN SERPL-MCNC: 20 MG/DL (ref 8–23)
BUN/CREAT SERPL: 32.8 (ref 7–25)
CALCIUM SERPL-MCNC: 10.2 MG/DL (ref 8.6–10.5)
CHLORIDE SERPL-SCNC: 103 MMOL/L (ref 98–107)
CHOLEST SERPL-MCNC: 232 MG/DL (ref 0–200)
CO2 SERPL-SCNC: 28.1 MMOL/L (ref 22–29)
CREAT SERPL-MCNC: 0.61 MG/DL (ref 0.57–1)
EOSINOPHIL # BLD AUTO: 0.13 10*3/MM3 (ref 0–0.4)
EOSINOPHIL NFR BLD AUTO: 1.7 % (ref 0.3–6.2)
ERYTHROCYTE [DISTWIDTH] IN BLOOD BY AUTOMATED COUNT: 14 % (ref 12.3–15.4)
GLOBULIN SER CALC-MCNC: 2.4 GM/DL
GLUCOSE SERPL-MCNC: 95 MG/DL (ref 65–99)
HCT VFR BLD AUTO: 35 % (ref 34–46.6)
HDLC SERPL-MCNC: 90 MG/DL (ref 40–60)
HGB BLD-MCNC: 11 G/DL (ref 12–15.9)
IMM GRANULOCYTES # BLD AUTO: 0.02 10*3/MM3 (ref 0–0.05)
IMM GRANULOCYTES NFR BLD AUTO: 0.3 % (ref 0–0.5)
LDLC SERPL CALC-MCNC: 124 MG/DL (ref 0–100)
LDLC/HDLC SERPL: 1.38 {RATIO}
LYMPHOCYTES # BLD AUTO: 1.65 10*3/MM3 (ref 0.7–3.1)
LYMPHOCYTES NFR BLD AUTO: 21.5 % (ref 19.6–45.3)
MCH RBC QN AUTO: 26.4 PG (ref 26.6–33)
MCHC RBC AUTO-ENTMCNC: 31.4 G/DL (ref 31.5–35.7)
MCV RBC AUTO: 83.9 FL (ref 79–97)
MONOCYTES # BLD AUTO: 0.6 10*3/MM3 (ref 0.1–0.9)
MONOCYTES NFR BLD AUTO: 7.8 % (ref 5–12)
NEUTROPHILS # BLD AUTO: 5.22 10*3/MM3 (ref 1.7–7)
NEUTROPHILS NFR BLD AUTO: 67.8 % (ref 42.7–76)
NRBC BLD AUTO-RTO: 0 /100 WBC (ref 0–0.2)
PLATELET # BLD AUTO: 341 10*3/MM3 (ref 140–450)
POTASSIUM SERPL-SCNC: 3.9 MMOL/L (ref 3.5–5.2)
PROT SERPL-MCNC: 7 G/DL (ref 6–8.5)
RBC # BLD AUTO: 4.17 10*6/MM3 (ref 3.77–5.28)
SODIUM SERPL-SCNC: 139 MMOL/L (ref 136–145)
TRIGL SERPL-MCNC: 90 MG/DL (ref 0–150)
TSH SERPL DL<=0.005 MIU/L-ACNC: 1.21 UIU/ML (ref 0.27–4.2)
VLDLC SERPL CALC-MCNC: 18 MG/DL
WBC # BLD AUTO: 7.69 10*3/MM3 (ref 3.4–10.8)

## 2020-06-03 NOTE — PROGRESS NOTES
TSH is good  cmp is good  Lipids remain stable and are good  Her cbc is showing new anemia-not severe but it has dropped a point    I would like her to schedule a televisit next week so that we can review this so that I can follow it up    Her EKG in the office was normal other than a low heart rate which was not concerning but was a little different than in the past and I would like to discuss this as well in televisit

## 2020-06-09 ENCOUNTER — TELEMEDICINE (OUTPATIENT)
Dept: FAMILY MEDICINE CLINIC | Facility: CLINIC | Age: 70
End: 2020-06-09

## 2020-06-09 DIAGNOSIS — D50.0 BLOOD LOSS ANEMIA: Primary | ICD-10-CM

## 2020-06-09 DIAGNOSIS — R00.1 BRADYCARDIA: ICD-10-CM

## 2020-06-09 DIAGNOSIS — R07.89 OTHER CHEST PAIN: ICD-10-CM

## 2020-06-09 PROCEDURE — 99213 OFFICE O/P EST LOW 20 MIN: CPT | Performed by: NURSE PRACTITIONER

## 2020-06-09 NOTE — PROGRESS NOTES
"Ashley Comer    1950  [id number]        HPI       Patient requested televisit to follow up on recent lab work indicating new anemia.  She was surprised as she has not had any recent anemia and not felt symptomatic.  She did donate blood just prior to labs and was told she was borderline at that time to donate.      Chest pain:  She has noticed a significant improvement in intensity and frequency of the burning in her chest-felt like she \"could breath fire\" -after starting the zyrtec.  She takes it in the evening as it makes her drowsy.  Feels like she has been sleeping better since starting it.  Sx began just prior to lock down.     Bradycardia:  Does not check her HR regularly, but feels like it has good variation.  She does not have a smart watch.  She has not noticed more fatigue with climbing stairs.  Walks more days a 20 min mile which she thinks is reasonable for her age.  She does not experience any dizziness.  Does not have chest pain that feels \"cardiac in nature\".  Occasionally feels a little\"ectopy\" in her chest but thinks it is normal and is intermittent and feels like a little flutter every once in awhile.  She has never had cardiac problems in past and not been seen by cardiologist.         Review of Systems - General ROS: negative for - chills, fatigue, fever, malaise, night sweats, sleep disturbance, weight gain or weight loss  Hematological and Lymphatic ROS: negative for - bleeding problems, blood clots, bruising, fatigue, pallor or weight loss  Cardiovascular ROS: negative for - dyspnea on exertion, edema, irregular heartbeat, loss of consciousness, orthopnea, rapid heart rate or shortness of breath  Gastrointestinal ROS: no abdominal pain, change in bowel habits, or black or bloody stools  Respiratory: negative for cough, dyspnea with or without exertion, wheezing.      Anemia:  Most likely R/T recent donation.  She is not sx and denies any blood loss noted jordan in stool and no vaginal " bleeding.  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  Will check CBC in 2 months, if she is still anemic will need to check stools for blood loss.  Most likely due to donation.    Bradycardia:  Her EKG was otherwise normal.  She thinks she has good variability of HR, and is otherwise not sx.  Will have her monitor her HR various times of the day and night and send me message next week to let me know how it is doing.  Does not feel like she needs cardiology     Chest pain:  Has greatly improved with addition of OTC zyrtec and stopping some new vegan lip balm.  Will expect that it will continue to improve if due to increased pollens in air.  If not, she will let me know.            Ashley was seen today for anemia, slow heart rate and chest pain.    Diagnoses and all orders for this visit:    Blood loss anemia    Bradycardia    Other chest pain        Any medications prescribed have been sent electronically to   [facility] [address] [place] [address] [address] [facility] AT [facility] - [phone]  - [phone] [address] [address]  Phone: [phone] Fax: [phone]        Fannie Aguirre, CARMELA  06/09/20  10:19 AM  Patient gave consent today for a telehealth video visit using [employer] via epic as following recommendations of our governor and CDC during the COVID-19 pandemic.    16 min was spent in discussion with pt and greater than 50% of that time was spent counseling.

## 2020-06-09 NOTE — PATIENT INSTRUCTIONS
Bradycardia, Adult  Bradycardia is a slower-than-normal heartbeat. A normal resting heart rate for an adult ranges from 60 to 100 beats per minute. With bradycardia, the resting heart rate is less than 60 beats per minute.  Bradycardia can prevent enough oxygen from reaching certain areas of your body when you are active. It can be serious if it keeps enough oxygen from reaching your brain and other parts of your body. Bradycardia is not a problem for everyone. For some healthy adults, a slow resting heart rate is normal.  What are the causes?  This condition may be caused by:  · A problem with the heart, including:  ? A problem with the heart's electrical system, such as a heart block. With a heart block, electrical signals between the chambers of the heart are partially or completely blocked, so they are not able to work as they should.  ? A problem with the heart's natural pacemaker (sinus node).  ? Heart disease.  ? A heart attack.  ? Heart damage.  ? Lyme disease.  ? A heart infection.  ? A heart condition that is present at birth (congenital heart defect).  · Certain medicines that treat heart conditions.  · Certain conditions, such as hypothyroidism and obstructive sleep apnea.  · Problems with the balance of chemicals and other substances, like potassium, in the blood.  · Trauma.  · Radiation therapy.  What increases the risk?  You are more likely to develop this condition if you:  · Are age 65 or older.  · Have high blood pressure (hypertension), high cholesterol (hyperlipidemia), or diabetes.  · Drink heavily, use tobacco or nicotine products, or use drugs.  What are the signs or symptoms?  Symptoms of this condition include:  · Light-headedness.  · Feeling faint or fainting.  · Fatigue and weakness.  · Trouble with activity or exercise.  · Shortness of breath.  · Chest pain (angina).  · Drowsiness.  · Confusion.  · Dizziness.  How is this diagnosed?  This condition may be diagnosed based on:  · Your  symptoms.  · Your medical history.  · A physical exam.  During the exam, your health care provider will listen to your heartbeat and check your pulse. To confirm the diagnosis, your health care provider may order tests, such as:  · Blood tests.  · An electrocardiogram (ECG). This test records the heart's electrical activity. The test can show how fast your heart is beating and whether the heartbeat is steady.  · A test in which you wear a portable device (event recorder or Holter monitor) to record your heart's electrical activity while you go about your day.  · An exercise test.  How is this treated?  Treatment for this condition depends on the cause of the condition and how severe your symptoms are. Treatment may involve:  · Treatment of the underlying condition.  · Changing your medicines or how much medicine you take.  · Having a small, battery-operated device called a pacemaker implanted under the skin. When bradycardia occurs, this device can be used to increase your heart rate and help your heart beat in a regular rhythm.  Follow these instructions at home:  Lifestyle    · Manage any health conditions that contribute to bradycardia as told by your health care provider.  · Follow a heart-healthy diet. A nutrition specialist (dietitian) can help educate you about healthy food options and changes.  · Follow an exercise program that is approved by your health care provider.  · Maintain a healthy weight.  · Try to reduce or manage your stress, such as with yoga or meditation. If you need help reducing stress, ask your health care provider.  · Do not use any products that contain nicotine or tobacco, such as cigarettes, e-cigarettes, and chewing tobacco. If you need help quitting, ask your health care provider.  · Do not use illegal drugs.  · Limit alcohol intake to no more than 1 drink a day for nonpregnant women and 2 drinks a day for men. Be aware of how much alcohol is in your drink. In the [place] one drink  equals one 12 oz bottle of beer (355 mL), one 5 oz glass of wine (148 mL), or one 1½ oz glass of hard liquor (44 mL).  General instructions  · Take over-the-counter and prescription medicines only as told by your health care provider.  · Keep all follow-up visits as told by your health care provider. This is important.  How is this prevented?  In some cases, bradycardia may be prevented by:  · Treating underlying medical problems.  · Stopping behaviors or medicines that can trigger the condition.  Contact a health care provider if you:  · Feel light-headed or dizzy.  · Almost faint.  · Feel weak or are easily fatigued during physical activity.  · Experience confusion or have memory problems.  Get help right away if:  · You faint.  · You have:  ? An irregular heartbeat (palpitations).  ? Chest pain.  ? Trouble breathing.  Summary  · Bradycardia is a slower-than-normal heartbeat. With bradycardia, the resting heart rate is less than 60 beats per minute.  · Treatment for this condition depends on the cause.  · Manage any health conditions that contribute to bradycardia as told by your health care provider.  · Do not use any products that contain nicotine or tobacco, such as cigarettes, e-cigarettes, and chewing tobacco, and limit alcohol intake.  · Keep all follow-up visits as told by your health care provider. This is important.  This information is not intended to replace advice given to you by your health care provider. Make sure you discuss any questions you have with your health care provider.  Document Released: 09/09/2003 Document Revised: 07/01/2019 Document Reviewed: 05/29/2019  [employer] Patient Education © 2020 [employer]  Anemia    Anemia is a condition in which you do not have enough red blood cells or hemoglobin. Hemoglobin is a substance in red blood cells that carries oxygen. When you do not have enough red blood cells or hemoglobin (are anemic), your body cannot get enough oxygen and your organs may  not work properly. As a result, you may feel very tired or have other problems.  What are the causes?  Common causes of anemia include:  · Excessive bleeding. Anemia can be caused by excessive bleeding inside or outside the body, including bleeding from the intestine or from periods in women.  · Poor nutrition.  · Long-lasting (chronic) kidney, thyroid, and liver disease.  · Bone marrow disorders.  · Cancer and treatments for cancer.  · HIV (human immunodeficiency virus) and AIDS (acquired immunodeficiency syndrome).  · Treatments for HIV and AIDS.  · Spleen problems.  · Blood disorders.  · Infections, medicines, and autoimmune disorders that destroy red blood cells.  What are the signs or symptoms?  Symptoms of this condition include:  · Minor weakness.  · Dizziness.  · Headache.  · Feeling heartbeats that are irregular or faster than normal (palpitations).  · Shortness of breath, especially with exercise.  · Paleness.  · Cold sensitivity.  · Indigestion.  · Nausea.  · Difficulty sleeping.  · Difficulty concentrating.  Symptoms may occur suddenly or develop slowly. If your anemia is mild, you may not have symptoms.  How is this diagnosed?  This condition is diagnosed based on:  · Blood tests.  · Your medical history.  · A physical exam.  · Bone marrow biopsy.  Your health care provider may also check your stool (feces) for blood and may do additional testing to look for the cause of your bleeding.  You may also have other tests, including:  · Imaging tests, such as a CT scan or MRI.  · Endoscopy.  · Colonoscopy.  How is this treated?  Treatment for this condition depends on the cause. If you continue to lose a lot of blood, you may need to be treated at a hospital. Treatment may include:  · Taking supplements of iron, vitamin B12, or folic acid.  · Taking a hormone medicine (erythropoietin) that can help to stimulate red blood cell growth.  · Having a blood transfusion. This may be needed if you lose a lot of  blood.  · Making changes to your diet.  · Having surgery to remove your spleen.  Follow these instructions at home:  · Take over-the-counter and prescription medicines only as told by your health care provider.  · Take supplements only as told by your health care provider.  · Follow any diet instructions that you were given.  · Keep all follow-up visits as told by your health care provider. This is important.  Contact a health care provider if:  · You develop new bleeding anywhere in the body.  Get help right away if:  · You are very weak.  · You are short of breath.  · You have pain in your abdomen or chest.  · You are dizzy or feel faint.  · You have trouble concentrating.  · You have bloody or black, tarry stools.  · You vomit repeatedly or you vomit up blood.  Summary  · Anemia is a condition in which you do not have enough red blood cells or enough of a substance in your red blood cells that carries oxygen (hemoglobin).  · Symptoms may occur suddenly or develop slowly.  · If your anemia is mild, you may not have symptoms.  · This condition is diagnosed with blood tests as well as a medical history and physical exam. Other tests may be needed.  · Treatment for this condition depends on the cause of the anemia.  This information is not intended to replace advice given to you by your health care provider. Make sure you discuss any questions you have with your health care provider.  Document Released: 01/25/2006 Document Revised: 11/30/2018 Document Reviewed: 01/19/2018  [employer] Patient Education © 2020 [employer]

## 2020-06-16 ENCOUNTER — RESULTS ENCOUNTER (OUTPATIENT)
Dept: FAMILY MEDICINE CLINIC | Facility: CLINIC | Age: 70
End: 2020-06-16

## 2020-06-16 DIAGNOSIS — R00.1 BRADYCARDIA: Primary | ICD-10-CM

## 2020-06-16 DIAGNOSIS — D50.0 BLOOD LOSS ANEMIA: ICD-10-CM

## 2020-06-23 ENCOUNTER — OFFICE VISIT (OUTPATIENT)
Dept: CARDIOLOGY | Facility: CLINIC | Age: 70
End: 2020-06-23

## 2020-06-23 VITALS
HEIGHT: 63 IN | HEART RATE: 69 BPM | BODY MASS INDEX: 24.1 KG/M2 | WEIGHT: 136 LBS | DIASTOLIC BLOOD PRESSURE: 82 MMHG | SYSTOLIC BLOOD PRESSURE: 148 MMHG

## 2020-06-23 DIAGNOSIS — R07.89 OTHER CHEST PAIN: ICD-10-CM

## 2020-06-23 DIAGNOSIS — R00.1 BRADYCARDIA, SINUS: Primary | ICD-10-CM

## 2020-06-23 DIAGNOSIS — R00.2 PALPITATIONS: ICD-10-CM

## 2020-06-23 PROCEDURE — 99204 OFFICE O/P NEW MOD 45 MIN: CPT | Performed by: INTERNAL MEDICINE

## 2020-06-23 PROCEDURE — 93000 ELECTROCARDIOGRAM COMPLETE: CPT | Performed by: INTERNAL MEDICINE

## 2020-06-23 RX ORDER — CETIRIZINE HYDROCHLORIDE 10 MG/1
10 TABLET ORAL DAILY
COMMUNITY
Start: 2020-06-05

## 2020-06-23 NOTE — PROGRESS NOTES
Subjective:     Encounter Date:06/23/2020      Patient ID: Ashley Comer is a 69 y.o. female.    Chief Complaint: bradycardia  HPI:   This is a 69-year-old woman who recently had her annual wellness exam.  EKG at that time showed sinus bradycardia with a heart rate of 50.  She is been monitoring her heart rate at home since then.  She periodically has heart rates in the 50s but is asymptomatic.  She has no dizziness, shortness of breath, weakness or fatigue.  Occasionally her heart rate gets into the 90s.  This morning she had a few minutes of palpitations and her heart rate was measured at 109.  She is measuring her rates with an анна on her phone.    She has good exercise tolerance and walks every day for 1 to 2 miles.  There are no exertional symptoms.  She is never had any cardiac disease in the past.  There is no family history of heart disease.  She is a former smoker who quit in 1982.  She is a  with 2 children who is retired.  She drinks alcohol occasionally.  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  She has not had any recurrence of cancer.  She has had recently had a burning sensation in the chest which is nonexertional, non-pleuritic, non-positional.  It lasts for a few minutes at a time and usually resolve spontaneously.  There is no association with food.  She has started taking Mylanta and has noticed an improvement in her symptoms with that.    The following portions of the patient's history were reviewed and updated as appropriate: allergies, current medications, past family history, past medical history, past social history, past surgical history and problem list.     REVIEW OF SYSTEMS:   All systems reviewed.  Pertinent positives identified in HPI.  All other systems are negative.    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   • Urinary tract infection 1970       Family History   Problem Relation Age of Onset   • Dementia Mother    • Hypertension Mother    • Tuberculosis Mother    • Pneumonia Mother    • Prostate cancer Father    • Tuberculosis Father    • Cancer Father         prostate w/bone mets   • Cancer Sister         teratoma, malignant    • Breast cancer Maternal Aunt    • Lung cancer Paternal Aunt    • Brain cancer Paternal Aunt    • Breast cancer Paternal Aunt    • Breast cancer Sister    • Cancer Maternal Aunt         Breast   • Cancer Paternal Aunt         Breast   • Cancer Paternal Aunt         Lung   • Cancer Paternal Aunt         Lung   • Cancer Sister         Malig Teratoma   • Cancer Sister         Breast   • Malig Hyperthermia Neg Hx        Social History     Socioeconomic History   • Marital status:      Spouse name: Jimmy   • Number of children: 2   • Years of education: COLLEGE   • Highest education level: Not on file   Occupational History   • Occupation: RN      Employer: [employer]     Employer: RETIRED   Tobacco Use   • Smoking status: Former Smoker     Packs/day: 1.00     Years: 25.00     Pack years: 25.00     Start date: 1968     Last attempt to quit:      Years since quittin.4   • Smokeless tobacco: Never Used   Substance and Sexual Activity   • Alcohol use: Yes     Comment: occas   • Drug use: No   • Sexual activity: Never   Social History Narrative    The patient'  was diagnosed with esophageal cancer on upper GI  endoscopy by Dr. Jose Enrique Yuan.        Allergies   Allergen Reactions   • Penicillins Rash     Rash over whole body       Past Surgical History:   Procedure Laterality Date   • APPENDECTOMY  2/23/2016   • CATARACT EXTRACTION Bilateral 2014   • COLONOSCOPY N/A 7/28/2017    Procedure: COLONOSCOPY INTO CECUM WITH POLYPECTOMY;  Surgeon: Mini Meraz MD;  Location: [facility] ENDOSCOPY;  Service:    • ENDOSCOPY N/A 7/28/2017    Procedure: ESOPHAGOGASTRODUODENOSCOPY WITH BIOPSIES;  Surgeon: Mini Meraz MD;  Location: [facility] ENDOSCOPY;  Service:    • FOOT SURGERY Right 1954    DEBRIDMENT   • LAPAROSCOPIC APPENDECTOMY     • SMALL INTESTINE SURGERY  02/2016    for GIST    • THYROID SURGERY Left 1993    thyroid lobectomy    • VITRECTOMY Right 11/2015         ECG 12 Lead  Date/Time: 6/23/2020 1:44 PM  Performed by: Coleen Nunez MD  Authorized by: Coleen Nunez MD   Comparison: compared with previous ECG from 6/1/2020  Rhythm: sinus rhythm  Rate: normal  Conduction: conduction normal  ST Segments: ST segments normal  T Waves: T waves normal  QRS axis: normal  Other: no other findings    Clinical impression: normal ECG               Objective:         PHYSICAL EXAM:  GEN: VSS, no distress,   Eyes: normal sclera, normal lids and lashes  HENT: moist mucus membranes,   Respiratory: CTAB, no rales or wheezes  CV: RRR, no murmurs, , +2 DP and 2+ carotid pulses b/l  GI: NABS, soft,  Nontender, nondistended  MSK: no edema, no scoliosis or kyphosis  Skin: no rash, warm, dry  Heme/Lymph: no bruising or bleeding  Psych: organized thought, normal behavior and affect  Neuro: Cranial nerves grossly intact, Alert and Oriented x 3.         Assessment:          Diagnosis Plan   1. Bradycardia, sinus     2. Other chest pain     3. Palpitations            Plan:         1.  Sinus bradycardia: Monitor.  Asymptomatic.  Good heart rate variability.  2.  Chest pain: She is had a burning sensation in the chest for the last few months.  There  is are atypical symptoms.  It improves with Mylanta.  I have encouraged her to visit her GI doctor.  3.  Palpitations: This morning she had a few minutes of palpitations for the first time.  Have asked her to monitor symptoms.  If they become more frequent or bothersome we could consider an outpatient monitor.    Fannie, thank you very much for referring this kind patient to me. Please call me with any questions or concerns. I will see the patient again in the office as needed.       Coleen Nunez MD  06/23/20  [facility]    Outpatient Encounter Medications as of 6/23/2020   Medication Sig Dispense Refill   • acetaminophen (TYLENOL) 500 MG tablet Take 1,000 mg by mouth Every 6 (Six) Hours As Needed for Mild Pain .     • ascorbic acid (VITAMIN C) 1000 MG tablet      • B Complex-C (SUPER B COMPLEX PO) Take 1 tablet by mouth daily. As directed     • calcium citrate-vitamin d (CITRACAL) 200-250 MG-UNIT tablet tablet Take 1 tablet by mouth 2 (Two) Times a Day.     • cetirizine (ZyrTEC Allergy) 10 MG tablet Take 5 mg by mouth Daily.     • Cholecalciferol (VITAMIN D3) 1000 UNITS capsule Take 1,000 Units by mouth Daily. Take as directed      • Multiple Vitamins-Minerals (MULTI FOR HER) tablet Take 1 tablet by mouth daily.     • Multiple Vitamins-Minerals (PRESERVISION AREDS 2 PO) Take  by mouth Daily.     • naproxen sodium (ALEVE) 220 MG tablet Take 220 mg by mouth 2 (Two) Times a Day With Meals.     • omeprazole (priLOSEC) 40 MG capsule TAKE ONE CAPSULE BY MOUTH DAILY 90 capsule 1   • RESTASIS 0.05 % ophthalmic emulsion      • [DISCONTINUED] Turmeric 500 MG tablet        No facility-administered encounter medications on file as of 6/23/2020.

## 2020-08-06 ENCOUNTER — LAB (OUTPATIENT)
Dept: LAB | Facility: HOSPITAL | Age: 70
End: 2020-08-06

## 2020-08-06 PROCEDURE — 36415 COLL VENOUS BLD VENIPUNCTURE: CPT | Performed by: NURSE PRACTITIONER

## 2020-08-06 PROCEDURE — 85025 COMPLETE CBC W/AUTO DIFF WBC: CPT | Performed by: NURSE PRACTITIONER

## 2020-08-07 LAB
BASOPHILS # BLD AUTO: 0.1 X10E3/UL (ref 0–0.2)
BASOPHILS NFR BLD AUTO: 1 %
EOSINOPHIL # BLD AUTO: 0.1 X10E3/UL (ref 0–0.4)
EOSINOPHIL # BLD AUTO: 2 %
ERYTHROCYTE [DISTWIDTH] IN BLOOD BY AUTOMATED COUNT: 14.1 % (ref 11.7–15.4)
HCT VFR BLD AUTO: 39.2 % (ref 34–46.6)
HGB BLD-MCNC: 12.7 G/DL (ref 11.1–15.9)
IMM GRANULOCYTES # BLD: 0 X10E3/UL (ref 0–0.1)
IMM GRANULOCYTES NFR BLD: 0 %
LYMPHOCYTES # BLD AUTO: 1.4 X10E3/UL (ref 0.7–3.1)
LYMPHOCYTES NFR BLD AUTO: 20 %
MCH RBC QN AUTO: 27.8 PG (ref 26.6–33)
MCHC RBC AUTO-ENTMCNC: 32.4 G/DL (ref 31.5–35.7)
MCV RBC AUTO: 86 FL (ref 79–97)
MONOCYTES # BLD AUTO: 0.6 X10E3/UL (ref 0.1–0.9)
MONOCYTES NFR BLD AUTO: 8 %
NEUTROPHILS # BLD AUTO: 4.9 X10E3/UL (ref 1.4–7)
NEUTROPHILS NFR BLD AUTO: 69 %
PLATELET # BLD AUTO: 321 X10E3/UL (ref 150–450)
RBC # BLD AUTO: 4.57 X10E6/UL (ref 3.77–5.28)
WBC # BLD AUTO: 7.1 X10E3/UL (ref 3.4–10.8)

## 2020-08-18 ENCOUNTER — OFFICE VISIT (OUTPATIENT)
Dept: SURGERY | Facility: CLINIC | Age: 70
End: 2020-08-18

## 2020-08-18 VITALS
WEIGHT: 138 LBS | DIASTOLIC BLOOD PRESSURE: 66 MMHG | BODY MASS INDEX: 24.45 KG/M2 | RESPIRATION RATE: 18 BRPM | OXYGEN SATURATION: 98 % | SYSTOLIC BLOOD PRESSURE: 138 MMHG | HEART RATE: 65 BPM | HEIGHT: 63 IN

## 2020-08-18 DIAGNOSIS — R13.10 DYSPHAGIA, UNSPECIFIED TYPE: Primary | ICD-10-CM

## 2020-08-18 DIAGNOSIS — K59.00 CONSTIPATION, UNSPECIFIED CONSTIPATION TYPE: ICD-10-CM

## 2020-08-18 DIAGNOSIS — Z86.010 HISTORY OF COLON POLYPS: ICD-10-CM

## 2020-08-18 PROCEDURE — 99204 OFFICE O/P NEW MOD 45 MIN: CPT | Performed by: SURGERY

## 2020-08-18 NOTE — PROGRESS NOTES
Chief Complaint   Patient presents with   • Heartburn    constipation, history of rectal polyp    Patient is a 69 y.o. female referred by Fannie Aguirre APRN for evaluation of heartburn, constipation and difficulty swallowing.  Patient has had a history of a small bowel GIST tumor.  Patient is also had a rectal polyp in the past.  Patient feels like she has difficulty swallowing solid food worse than liquid.  And has been having increasing frequency of reflux despite being on Prilosec.  Patient denies melena, hematochezia or bright red blood per rectum.  Patient denies any family history of ulcerative colitis, Crohn's disease, familial polyposis or colon cancer.    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   • Urinary tract infection 1970     Past Surgical History:   Procedure Laterality Date   • APPENDECTOMY  2/23/2016   • CATARACT EXTRACTION Bilateral 2014   • COLONOSCOPY N/A 7/28/2017    Procedure: COLONOSCOPY INTO CECUM WITH POLYPECTOMY;  Surgeon: Mini Meraz MD;  Location: [facility] ENDOSCOPY;  Service:    • ENDOSCOPY N/A 7/28/2017    Procedure: ESOPHAGOGASTRODUODENOSCOPY WITH BIOPSIES;  Surgeon: Mini Meraz MD;  Location: [facility] ENDOSCOPY;  Service:    • FOOT SURGERY Right 1954    DEBRIDMENT   • LAPAROSCOPIC APPENDECTOMY     • SMALL INTESTINE SURGERY  02/2016    for GIST    • THYROID SURGERY Left      thyroid lobectomy    • VITRECTOMY Right 2015     Family History   Problem Relation Age of Onset   • Dementia Mother    • Hypertension Mother    • Tuberculosis Mother    • Pneumonia Mother    • Prostate cancer Father    • Tuberculosis Father    • Cancer Father         prostate w/bone mets   • Cancer Sister         teratoma, malignant    • Breast cancer Maternal Aunt    • Lung cancer Paternal Aunt    • Brain cancer Paternal Aunt    • Breast cancer Paternal Aunt    • Breast cancer Sister    • Cancer Maternal Aunt         Breast   • Cancer Paternal Aunt         Breast   • Cancer Paternal Aunt         Lung   • Cancer Paternal Aunt         Lung   • Cancer Sister         Malig Teratoma   • Cancer Sister         Breast   • Malig Hyperthermia Neg Hx      Social History     Tobacco Use   • Smoking status: Former Smoker     Packs/day: 1.00     Years: 25.00     Pack years: 25.00     Start date: 1968     Last attempt to quit:      Years since quittin.6   • Smokeless tobacco: Never Used   Substance Use Topics   • Alcohol use: Yes     Comment: occas   • Drug use: No     Allergies   Allergen Reactions   • Penicillins Rash     Rash over whole body       Current Outpatient Medications:   •  acetaminophen (TYLENOL) 500 MG tablet, Take 1,000 mg by mouth Every 6 (Six) Hours As Needed for Mild Pain ., Disp: , Rfl:   •  ascorbic acid (VITAMIN C) 1000 MG tablet, , Disp: , Rfl:   •  B Complex-C (SUPER B COMPLEX PO), Take 1 tablet by mouth daily. As directed, Disp: , Rfl:   •  calcium citrate-vitamin d (CITRACAL) 200-250 MG-UNIT tablet tablet, Take 1 tablet by mouth 2 (Two) Times a Day., Disp: , Rfl:   •  cetirizine (ZyrTEC Allergy) 10 MG tablet, Take 5 mg by mouth Daily., Disp: , Rfl:   •  Cholecalciferol (VITAMIN D3) 1000 UNITS capsule, Take 1,000 Units by mouth Daily. Take as directed , Disp: , Rfl:   •  Multiple Vitamins-Minerals (MULTI FOR HER) tablet, Take 1 tablet by mouth daily., Disp: , Rfl:   •  Multiple  "Vitamins-Minerals (PRESERVISION AREDS 2 PO), Take  by mouth Daily., Disp: , Rfl:   •  naproxen sodium (ALEVE) 220 MG tablet, Take 220 mg by mouth 2 (Two) Times a Day With Meals., Disp: , Rfl:   •  omeprazole (priLOSEC) 40 MG capsule, TAKE ONE CAPSULE BY MOUTH DAILY, Disp: 90 capsule, Rfl: 1  •  RESTASIS 0.05 % ophthalmic emulsion, , Disp: , Rfl:     Review of Systems  General: Patient reports during good health  Eyes: No eye problems  Ears, nose, mouth and throat: No rhinitis, no hearing problems, no chronic cough  Cardiovascular/heart: Denies palpitations, syncope or chest pain  Respiratory/lung: Denies shortness of breath, hemoptysis, dyspnea on exertion   Genital/urinary: No frequency, hematuria or dysuria  Hematological/lymphatic: Denies anemia or other problems  Musculoskeletal: Positive neck pain  Skin: No psoriasis or other skin issues  Neurological: No seizures or other neurological problems  Psychiatric: None  Endocrine: Negative  Gastro-intestinal: No constipation, no diarrhea, no melena, no hematochezia  Vitals:    08/18/20 1045   BP: 138/66   Pulse: 65   Resp: 18   SpO2: 98%   Weight: 62.6 kg (138 lb)   Height: 160 cm (63\")       Physical Exam  General/physcological:   Alert and oriented x3, in no acute distress  HEENT: Normal cephalic, atraumatic, PERRLA, EOMI, sclera anicteric, moist mucous membranes, neck is supple, no JVD, no carotid bruits, no thyromegaly no adenopathy  Respiratory: CTA and percussion  CVA: RRR, normal S1-S2, no murmurs, no gallops or rubs  GI: Positive BS, soft, nondistended, nontender, no rebound, no guarding, no hernias, no organomegaly and no masses  Musculoskeletal: Moves all 4 ext, no clubbing, no cyanosis or edema  Neurovascular: Grossly intact  Debilities: none  Emotional behavior: appropriate     Patient does not use tobacco products currently.     Assessment:  GERD  History of rectal polyp  Dysphagia    Plan:  I have recommended that the patient undergo further evaluation " with EGD and a colonoscopy.  I have discussed these 2 procedures in detail with the patient.  I have discussed the risks, benefits and alternatives.  I have discussed the risk of anesthesia, bleeding and perforation.  Patient understands these risks, benefits and alternatives and wishes to proceed.  I have her scheduled at her earliest convenience.    Mini Meraz MD  General, Minimally Invasive and Endoscopic Surgery  [facility]      [address]    [address]  [address] [address]    P: [phone]  F: [phone]    Cc:  Fannie Aguirre, APRN

## 2020-08-26 ENCOUNTER — LAB REQUISITION (OUTPATIENT)
Dept: LAB | Facility: HOSPITAL | Age: 70
End: 2020-08-26

## 2020-08-26 DIAGNOSIS — Z00.00 ENCOUNTER FOR GENERAL ADULT MEDICAL EXAMINATION WITHOUT ABNORMAL FINDINGS: ICD-10-CM

## 2020-08-28 PROCEDURE — U0004 COV-19 TEST NON-CDC HGH THRU: HCPCS | Performed by: SURGERY

## 2020-08-30 LAB
REF LAB TEST METHOD: NORMAL
SARS-COV-2 RNA RESP QL NAA+PROBE: NOT DETECTED

## 2020-08-31 ENCOUNTER — OUTSIDE FACILITY SERVICE (OUTPATIENT)
Dept: SURGERY | Facility: CLINIC | Age: 70
End: 2020-08-31

## 2020-08-31 ENCOUNTER — LAB REQUISITION (OUTPATIENT)
Dept: LAB | Facility: HOSPITAL | Age: 70
End: 2020-08-31

## 2020-08-31 DIAGNOSIS — R13.10 DYSPHAGIA, UNSPECIFIED: ICD-10-CM

## 2020-08-31 PROCEDURE — G0105 COLORECTAL SCRN; HI RISK IND: HCPCS | Performed by: SURGERY

## 2020-08-31 PROCEDURE — 88305 TISSUE EXAM BY PATHOLOGIST: CPT | Performed by: SURGERY

## 2020-08-31 PROCEDURE — 43239 EGD BIOPSY SINGLE/MULTIPLE: CPT | Performed by: SURGERY

## 2020-09-01 LAB
CYTO UR: NORMAL
LAB AP CASE REPORT: NORMAL
LAB AP CLINICAL INFORMATION: NORMAL
PATH REPORT.FINAL DX SPEC: NORMAL
PATH REPORT.GROSS SPEC: NORMAL

## 2020-09-04 ENCOUNTER — TRANSCRIBE ORDERS (OUTPATIENT)
Dept: ADMINISTRATIVE | Facility: HOSPITAL | Age: 70
End: 2020-09-04

## 2020-09-04 DIAGNOSIS — E04.9 NONTOXIC GOITER: Primary | ICD-10-CM

## 2020-09-08 DIAGNOSIS — K21.9 GASTROESOPHAGEAL REFLUX DISEASE WITHOUT ESOPHAGITIS: ICD-10-CM

## 2020-09-08 RX ORDER — OMEPRAZOLE 40 MG/1
CAPSULE, DELAYED RELEASE ORAL
Qty: 90 CAPSULE | Refills: 0 | Status: SHIPPED | OUTPATIENT
Start: 2020-09-08 | End: 2020-12-07

## 2020-09-11 ENCOUNTER — HOSPITAL ENCOUNTER (OUTPATIENT)
Dept: ULTRASOUND IMAGING | Facility: HOSPITAL | Age: 70
Discharge: HOME | End: 2020-09-11
Admitting: OTOLARYNGOLOGY

## 2020-09-11 DIAGNOSIS — E04.9 NONTOXIC GOITER: ICD-10-CM

## 2020-09-11 PROCEDURE — 76536 US EXAM OF HEAD AND NECK: CPT

## 2020-12-06 DIAGNOSIS — K21.9 GASTROESOPHAGEAL REFLUX DISEASE WITHOUT ESOPHAGITIS: ICD-10-CM

## 2020-12-07 RX ORDER — OMEPRAZOLE 40 MG/1
CAPSULE, DELAYED RELEASE ORAL
Qty: 90 CAPSULE | Refills: 1 | Status: SHIPPED | OUTPATIENT
Start: 2020-12-07 | End: 2021-06-08

## 2020-12-08 ENCOUNTER — APPOINTMENT (OUTPATIENT)
Dept: WOMENS IMAGING | Facility: HOSPITAL | Age: 70
End: 2020-12-08

## 2020-12-08 PROCEDURE — 77067 SCR MAMMO BI INCL CAD: CPT | Performed by: RADIOLOGY

## 2020-12-08 PROCEDURE — 77063 BREAST TOMOSYNTHESIS BI: CPT | Performed by: RADIOLOGY

## 2021-01-25 ENCOUNTER — HOSPITAL ENCOUNTER (OUTPATIENT)
Dept: PET IMAGING | Facility: HOSPITAL | Age: 71
Discharge: HOME | End: 2021-01-25
Admitting: INTERNAL MEDICINE

## 2021-01-25 ENCOUNTER — LAB (OUTPATIENT)
Dept: LAB | Facility: HOSPITAL | Age: 71
End: 2021-01-25

## 2021-01-25 DIAGNOSIS — C49.A3 GIST (GASTROINTESTINAL STROMAL TUMOR) OF SMALL BOWEL, MALIGNANT (HCC): ICD-10-CM

## 2021-01-25 LAB
ALBUMIN SERPL-MCNC: 4.5 G/DL (ref 3.5–5.2)
ALBUMIN/GLOB SERPL: 1.7 G/DL (ref 1.1–2.4)
ALP SERPL-CCNC: 82 U/L (ref 38–116)
ALT SERPL W P-5'-P-CCNC: 14 U/L (ref 0–33)
ANION GAP SERPL CALCULATED.3IONS-SCNC: 12 MMOL/L (ref 5–15)
AST SERPL-CCNC: 19 U/L (ref 0–32)
BASOPHILS # BLD AUTO: 0.07 10*3/MM3 (ref 0–0.2)
BASOPHILS NFR BLD AUTO: 1.1 % (ref 0–1.5)
BILIRUB SERPL-MCNC: 0.3 MG/DL (ref 0.2–1.2)
BUN SERPL-MCNC: 17 MG/DL (ref 6–20)
BUN/CREAT SERPL: 28.8 (ref 7.3–30)
CALCIUM SPEC-SCNC: 10.2 MG/DL (ref 8.5–10.2)
CHLORIDE SERPL-SCNC: 102 MMOL/L (ref 98–107)
CO2 SERPL-SCNC: 26 MMOL/L (ref 22–29)
CREAT BLDA-MCNC: 0.7 MG/DL (ref 0.6–1.3)
CREAT SERPL-MCNC: 0.59 MG/DL (ref 0.6–1.1)
DEPRECATED RDW RBC AUTO: 38.8 FL (ref 37–54)
EOSINOPHIL # BLD AUTO: 0.13 10*3/MM3 (ref 0–0.4)
EOSINOPHIL NFR BLD AUTO: 2 % (ref 0.3–6.2)
ERYTHROCYTE [DISTWIDTH] IN BLOOD BY AUTOMATED COUNT: 12 % (ref 12.3–15.4)
GFR SERPL CREATININE-BSD FRML MDRD: 100 ML/MIN/1.73
GLOBULIN UR ELPH-MCNC: 2.7 GM/DL (ref 1.8–3.5)
GLUCOSE SERPL-MCNC: 68 MG/DL (ref 74–124)
HCT VFR BLD AUTO: 38.7 % (ref 34–46.6)
HGB BLD-MCNC: 12.1 G/DL (ref 12–15.9)
IMM GRANULOCYTES # BLD AUTO: 0.01 10*3/MM3 (ref 0–0.05)
IMM GRANULOCYTES NFR BLD AUTO: 0.2 % (ref 0–0.5)
LYMPHOCYTES # BLD AUTO: 1.39 10*3/MM3 (ref 0.7–3.1)
LYMPHOCYTES NFR BLD AUTO: 21.1 % (ref 19.6–45.3)
MCH RBC QN AUTO: 27.8 PG (ref 26.6–33)
MCHC RBC AUTO-ENTMCNC: 31.3 G/DL (ref 31.5–35.7)
MCV RBC AUTO: 89 FL (ref 79–97)
MONOCYTES # BLD AUTO: 0.5 10*3/MM3 (ref 0.1–0.9)
MONOCYTES NFR BLD AUTO: 7.6 % (ref 5–12)
NEUTROPHILS NFR BLD AUTO: 4.49 10*3/MM3 (ref 1.7–7)
NEUTROPHILS NFR BLD AUTO: 68 % (ref 42.7–76)
NRBC BLD AUTO-RTO: 0 /100 WBC (ref 0–0.2)
PLATELET # BLD AUTO: 334 10*3/MM3 (ref 140–450)
PMV BLD AUTO: 9.6 FL (ref 6–12)
POTASSIUM SERPL-SCNC: 4.5 MMOL/L (ref 3.5–4.7)
PROT SERPL-MCNC: 7.2 G/DL (ref 6.3–8)
RBC # BLD AUTO: 4.35 10*6/MM3 (ref 3.77–5.28)
SODIUM SERPL-SCNC: 140 MMOL/L (ref 134–145)
WBC # BLD AUTO: 6.59 10*3/MM3 (ref 3.4–10.8)

## 2021-01-25 PROCEDURE — 80053 COMPREHEN METABOLIC PANEL: CPT

## 2021-01-25 PROCEDURE — 71260 CT THORAX DX C+: CPT

## 2021-01-25 PROCEDURE — 25010000002 IOPAMIDOL 61 % SOLUTION: Performed by: INTERNAL MEDICINE

## 2021-01-25 PROCEDURE — 82565 ASSAY OF CREATININE: CPT

## 2021-01-25 PROCEDURE — 36415 COLL VENOUS BLD VENIPUNCTURE: CPT

## 2021-01-25 PROCEDURE — 0 DIATRIZOATE MEGLUMINE & SODIUM PER 1 ML: Performed by: INTERNAL MEDICINE

## 2021-01-25 PROCEDURE — 85025 COMPLETE CBC W/AUTO DIFF WBC: CPT

## 2021-01-25 PROCEDURE — 74177 CT ABD & PELVIS W/CONTRAST: CPT

## 2021-01-25 RX ADMIN — IOPAMIDOL 85 ML: 612 INJECTION, SOLUTION INTRAVENOUS at 09:13

## 2021-01-25 RX ADMIN — DIATRIZOATE MEGLUMINE AND DIATRIZOATE SODIUM 30 ML: 660; 100 LIQUID ORAL; RECTAL at 08:08

## 2021-02-01 ENCOUNTER — OFFICE VISIT (OUTPATIENT)
Dept: ONCOLOGY | Facility: CLINIC | Age: 71
End: 2021-02-01

## 2021-02-01 ENCOUNTER — APPOINTMENT (OUTPATIENT)
Dept: LAB | Facility: HOSPITAL | Age: 71
End: 2021-02-01

## 2021-02-01 VITALS
HEIGHT: 63 IN | DIASTOLIC BLOOD PRESSURE: 81 MMHG | HEART RATE: 61 BPM | OXYGEN SATURATION: 97 % | TEMPERATURE: 97.5 F | SYSTOLIC BLOOD PRESSURE: 144 MMHG | BODY MASS INDEX: 24.63 KG/M2 | WEIGHT: 139 LBS | RESPIRATION RATE: 16 BRPM

## 2021-02-01 DIAGNOSIS — C49.A3 GIST (GASTROINTESTINAL STROMAL TUMOR) OF SMALL BOWEL, MALIGNANT (HCC): Primary | ICD-10-CM

## 2021-02-01 PROCEDURE — 99213 OFFICE O/P EST LOW 20 MIN: CPT | Performed by: INTERNAL MEDICINE

## 2021-02-01 NOTE — PROGRESS NOTES
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  HISTORY OF PRESENT ILLNESS:     History of Present Illness     Mrs. Comer is a 70 y.o. female here today for annual follow-up visit to our office.  She underwent surveillance CT scans 1/20/2020 which was stable and showing no evidence of recurrent disease.    She looks great in the office today with no new symptoms.  She is planning a cruise to [place] later in the year.    Past Oncologic History  She had been seen in consultation during her admission to [facility] from 2/20/2016 to 2/26/2016.  She was admitted at that time with symptomatic anemia and GI bleeding.  On endoscopy the blood appeared to be coming from the region of the appendix and she underwent a laparoscopic appendectomy with Dr. Meraz on 2/22/2016.  Also at the same procedure she was found to have a tumor within the small bowel and had a partial small bowel resection with reanastomosis.  The pathology from that specimen showed a gastrointestinal stromal tumor (GIST) .  The tumor was 4.5 cm and appeared to be low-grade with 0 mitotic figures per high-powered field.    We discussed with Miss Comer at that time that we would not recommend any postop adjuvant Gleevec therapy as her prognosis was very good with surgery alone.  We did however discuss some follow-up in the office with plans to perform surveillance CT scans for a few years after surgery.    She is here today for annual follow-up and review of surveillance scans.  She is now 5 years out from her surgery and seems to be doing great.  The scans performed on  1/25/2021 do not show any evidence of recurrent or metastatic disease radiographically.  She also had endoscopic surveillance with Dr. Meraz in August 2020 with no major findings.      Past Medical History, Past Surgical History, Social History, Family History have been reviewed and are without significant changes except as mentioned.    Review of  "Systems   Constitutional: Negative for activity change, appetite change, fatigue, fever and unexpected weight change.   HENT: Negative for hearing loss, nosebleeds, trouble swallowing and voice change.    Eyes: Negative for visual disturbance.   Respiratory: Negative for cough, shortness of breath and wheezing.    Cardiovascular: Negative for chest pain and palpitations.   Gastrointestinal: Negative for abdominal pain, diarrhea, nausea and vomiting.   Genitourinary: Negative for difficulty urinating, frequency, hematuria and urgency.   Musculoskeletal: Negative for back pain and neck pain.   Skin: Negative for rash.   Neurological: Negative for dizziness, seizures, syncope and headaches.   Hematological: Negative for adenopathy. Does not bruise/bleed easily.   Psychiatric/Behavioral: Negative for behavioral problems. The patient is not nervous/anxious.       A comprehensive 14 point review of systems was performed and was negative except as mentioned.    Medications:  The current medication list was reviewed in the EMR    ALLERGIES:    Allergies   Allergen Reactions   • Penicillins Rash     Rash over whole body       Objective      Vitals:    02/01/21 1138   BP: 144/81   Pulse: 61   Resp: 16   Temp: 97.5 °F (36.4 °C)   TempSrc: Skin   SpO2: 97%   Weight: 63 kg (139 lb)  Comment: new weight   Height: 159 cm (62.6\")  Comment: new height   PainSc: 0-No pain     Current Status 1/30/2020   ECOG score 0       Physical Exam   Constitutional: She is oriented to person, place, and time. She appears well-developed. No distress.   HENT:   Head: Normocephalic.   Eyes: Pupils are equal, round, and reactive to light. Conjunctivae are normal. No scleral icterus.   Neck: Normal range of motion. Neck supple. No JVD present. No thyromegaly present.   Cardiovascular: Normal rate and regular rhythm. Exam reveals no gallop and no friction rub.   No murmur heard.  Pulmonary/Chest: Effort normal and breath sounds normal. She has no " wheezes. She has no rales.   Abdominal: Soft. She exhibits no distension and no mass. There is no abdominal tenderness.   Musculoskeletal: Normal range of motion. No deformity.   Lymphadenopathy:     She has no cervical adenopathy.   Neurological: She is alert and oriented to person, place, and time. She has normal reflexes. No cranial nerve deficit.   Skin: Skin is warm and dry. No rash noted. No erythema.   Psychiatric: Her behavior is normal. Judgment normal.        RECENT LABS:  Hematology WBC   Date Value Ref Range Status   01/25/2021 6.59 3.40 - 10.80 10*3/mm3 Final   08/06/2020 7.1 3.4 - 10.8 x10E3/uL Final     RBC   Date Value Ref Range Status   01/25/2021 4.35 3.77 - 5.28 10*6/mm3 Final   08/06/2020 4.57 3.77 - 5.28 x10E6/uL Final     Hemoglobin   Date Value Ref Range Status   01/25/2021 12.1 12.0 - 15.9 g/dL Final     Hematocrit   Date Value Ref Range Status   01/25/2021 38.7 34.0 - 46.6 % Final     Platelets   Date Value Ref Range Status   01/25/2021 334 140 - 450 10*3/mm3 Final              Lab Results   Component Value Date    IRON 105 01/23/2019    TIBC 350 01/23/2019    FERRITIN 51.30 01/23/2019       Lab Results   Component Value Date    GLUCOSE 68 (L) 01/25/2021    BUN 17 01/25/2021    CREATININE 0.70 01/25/2021    EGFRIFNONA 100 01/25/2021    EGFRIFAFRI 118 06/01/2020    BCR 28.8 01/25/2021    CO2 26.0 01/25/2021    CALCIUM 10.2 01/25/2021    PROTENTOTREF 7.0 06/01/2020    ALBUMIN 4.50 01/25/2021    LABIL2 1.9 06/01/2020    AST 19 01/25/2021    ALT 14 01/25/2021     CT OF THE CHEST ABDOMEN AND PELVIS WITH CONTRAST 1/25/2021   CONCLUSION: Stable CT scan of the chest, no acute intrathoracic  abnormality or suspicious pulmonary lesion has developed.  CONCLUSION: Stable CT scan of the abdomen and pelvis, specifically no  change in the small bowel anastomosis. No indication of local tumor  recurrence nor visceral metastasis.      Assessment/Plan      1.  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 Meraz with good margins.  The tumor appeared to be low-grade with good prognostic features.  Patient continues to do well and CT scans show no definite evidence of recurrent or metastatic disease now 5 years out from surgery.  2.  Iron deficiency anemia due to GI blood loss. Resolved.     Plan    1.  We reviewed the scan and lab results with the patient and provided her printed copies of the report.  2.  At this point, she is now 5 years out from her surgery and we will not schedule routine follow-up but would be happy to see her anytime in the future if she has new concerns.                    2/1/2021      CC:

## 2021-03-02 DIAGNOSIS — Z23 IMMUNIZATION DUE: ICD-10-CM

## 2021-06-06 DIAGNOSIS — K21.9 GASTROESOPHAGEAL REFLUX DISEASE WITHOUT ESOPHAGITIS: ICD-10-CM

## 2021-06-08 DIAGNOSIS — K21.9 GASTROESOPHAGEAL REFLUX DISEASE WITHOUT ESOPHAGITIS: ICD-10-CM

## 2021-06-08 RX ORDER — OMEPRAZOLE 40 MG/1
CAPSULE, DELAYED RELEASE ORAL
Qty: 90 CAPSULE | Refills: 0 | Status: SHIPPED | OUTPATIENT
Start: 2021-06-08 | End: 2021-09-07 | Stop reason: SDUPTHER

## 2021-06-08 RX ORDER — OMEPRAZOLE 40 MG/1
CAPSULE, DELAYED RELEASE ORAL
Qty: 90 CAPSULE | Refills: 0 | OUTPATIENT
Start: 2021-06-08

## 2021-06-14 ENCOUNTER — OFFICE VISIT (OUTPATIENT)
Dept: FAMILY MEDICINE CLINIC | Facility: CLINIC | Age: 71
End: 2021-06-14

## 2021-06-14 VITALS
HEIGHT: 62 IN | BODY MASS INDEX: 25.43 KG/M2 | WEIGHT: 138.2 LBS | HEART RATE: 83 BPM | DIASTOLIC BLOOD PRESSURE: 79 MMHG | SYSTOLIC BLOOD PRESSURE: 142 MMHG | OXYGEN SATURATION: 99 % | TEMPERATURE: 97.7 F

## 2021-06-14 DIAGNOSIS — E04.2 MULTIPLE THYROID NODULES: ICD-10-CM

## 2021-06-14 DIAGNOSIS — K21.9 GASTROESOPHAGEAL REFLUX DISEASE WITHOUT ESOPHAGITIS: ICD-10-CM

## 2021-06-14 DIAGNOSIS — M13.0 POLYARTHRITIS: ICD-10-CM

## 2021-06-14 DIAGNOSIS — R35.0 URINARY FREQUENCY: ICD-10-CM

## 2021-06-14 DIAGNOSIS — D50.0 BLOOD LOSS ANEMIA: ICD-10-CM

## 2021-06-14 DIAGNOSIS — Z13.220 SCREENING FOR LIPOID DISORDERS: ICD-10-CM

## 2021-06-14 DIAGNOSIS — Z00.00 ENCOUNTER FOR ANNUAL WELLNESS EXAM IN MEDICARE PATIENT: Primary | ICD-10-CM

## 2021-06-14 PROCEDURE — G0439 PPPS, SUBSEQ VISIT: HCPCS | Performed by: NURSE PRACTITIONER

## 2021-06-14 RX ORDER — CALCIUM/PHOS/GENIST/D3/ZN/K 500MG-70MG
CAPSULE ORAL
COMMUNITY
Start: 2021-01-18 | End: 2022-04-28

## 2021-06-14 NOTE — PROGRESS NOTES
The ABCs of the Annual Wellness Visit  Subsequent Medicare Wellness Visit    Chief Complaint   Patient presents with   • Annual Exam     AWV       Subjective   History of Present Illness:  Ashley Comer is a 70 y.o. female who presents for a Subsequent Medicare Wellness Visit.    HEALTH RISK ASSESSMENT    Recent Hospitalizations:  No hospitalization(s) within the last year.    Current Medical Providers:  Patient Care Team:  Fannie Aguirre APRN as PCP - General (Nurse Practitioner)  Vargas Mejias MD as Consulting Physician (Hematology and Oncology)  Mini Meraz MD as Referring Physician (General Surgery)  Sacha Porter MD as Consulting Physician (Obstetrics and Gynecology)    Smoking Status:  Social History     Tobacco Use   Smoking Status Former Smoker   • Packs/day: 1.00   • Years: 25.00   • Pack years: 25.00   • Start date: 1968   • Quit date:    • Years since quittin.4   Smokeless Tobacco Never Used       Alcohol Consumption:  Social History     Substance and Sexual Activity   Alcohol Use Yes    Comment: occas       Depression Screen:   PHQ-2/PHQ-9 Depression Screening 2021   Little interest or pleasure in doing things 0   Feeling down, depressed, or hopeless 0   Trouble falling or staying asleep, or sleeping too much 0   Feeling tired or having little energy 0   Poor appetite or overeating 0   Feeling bad about yourself - or that you are a failure or have let yourself or your family down 0   Trouble concentrating on things, such as reading the newspaper or watching television 0   Moving or speaking so slowly that other people could have noticed. Or the opposite - being so fidgety or restless that you have been moving around a lot more than usual 0   Thoughts that you would be better off dead, or of hurting yourself in some way 0   Total Score 0   If you checked off any problems, how difficult have these problems made it for you to do your work, take care of things at home,  or get along with other people? -       Fall Risk Screen:  SUSI Fall Risk Assessment was completed, and patient is at LOW risk for falls.Assessment completed on:6/14/2021    Health Habits and Functional and Cognitive Screening:  Functional & Cognitive Status 6/14/2021   Do you have difficulty preparing food and eating? No   Do you have difficulty bathing yourself, getting dressed or grooming yourself? No   Do you have difficulty using the toilet? No   Do you have difficulty moving around from place to place? No   Do you have trouble with steps or getting out of a bed or a chair? No   Current Diet Well Balanced Diet   Dental Exam Up to date        Dental Exam Comment -   Eye Exam Up to date        Eye Exam Comment -   Exercise (times per week) 1 times per week   Current Exercises Include Walking   Current Exercise Activities Include -   Do you need help using the phone?  No   Are you deaf or do you have serious difficulty hearing?  No   Do you need help with transportation? No   Do you need help shopping? No   Do you need help preparing meals?  No   Do you need help with housework?  No   Do you need help with laundry? No   Do you need help taking your medications? No   Do you need help managing money? No   Do you ever drive or ride in a car without wearing a seat belt? No   Have you felt unusual stress, anger or loneliness in the last month? Yes   Who do you live with? Other   If you need help, do you have trouble finding someone available to you? No   Have you been bothered in the last four weeks by sexual problems? No   Do you have difficulty concentrating, remembering or making decisions? No         Does the patient have evidence of cognitive impairment? No    Asprin use counseling:Does not need ASA (and currently is not on it)    Age-appropriate Screening Schedule:  Refer to the list below for future screening recommendations based on patient's age, sex and/or medical conditions. Orders for these recommended  tests are listed in the plan section. The patient has been provided with a written plan.    Health Maintenance   Topic Date Due   • LIPID PANEL  06/01/2021   • DXA SCAN  01/31/2022 (Originally 11/7/2019)   • INFLUENZA VACCINE  08/01/2021   • MAMMOGRAM  12/08/2021   • TDAP/TD VACCINES (2 - Td or Tdap) 02/22/2022   • ZOSTER VACCINE  Discontinued          The following portions of the patient's history were reviewed and updated as appropriate: allergies, current medications, past family history, past medical history, past social history, past surgical history and problem list.    Outpatient Medications Prior to Visit   Medication Sig Dispense Refill   • acetaminophen (TYLENOL) 500 MG tablet Take 1,000 mg by mouth Every 6 (Six) Hours As Needed for Mild Pain .     • ascorbic acid (VITAMIN C) 1000 MG tablet      • B Complex-C (SUPER B COMPLEX PO) Take 1 tablet by mouth daily. As directed     • calcium citrate-vitamin d (CITRACAL) 200-250 MG-UNIT tablet tablet Take 1 tablet by mouth 2 (Two) Times a Day.     • cetirizine (ZyrTEC Allergy) 10 MG tablet Take 5 mg by mouth Daily.     • Cholecalciferol (VITAMIN D3) 1000 UNITS capsule Take 1,000 Units by mouth Daily. Take as directed      • Dietary Management Product (Fosteum Plus) capsule      • Multiple Vitamins-Minerals (MULTI FOR HER) tablet Take 1 tablet by mouth daily.     • Multiple Vitamins-Minerals (PRESERVISION AREDS 2 PO) Take  by mouth Daily.     • naproxen sodium (ALEVE) 220 MG tablet Take 220 mg by mouth 2 (Two) Times a Day With Meals.     • omeprazole (priLOSEC) 40 MG capsule TAKE ONE CAPSULE BY MOUTH DAILY 90 capsule 0   • RESTASIS 0.05 % ophthalmic emulsion        No facility-administered medications prior to visit.       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 Patient has an advance directive (not in EMR), copy requested.    Review of Systems   Constitutional: Negative for activity change, appetite change, fatigue and unexpected weight change.   HENT: Positive for hearing loss (Thinks it is mild). Negative for congestion, ear pain and sore throat.    Eyes: Negative for pain and redness.   Respiratory: Negative for cough and shortness of breath.    Cardiovascular: Negative for chest pain, palpitations and leg swelling.   Gastrointestinal: Negative for abdominal pain, blood in stool, constipation, diarrhea, nausea and vomiting.   Genitourinary: Negative for dysuria and hematuria.   Musculoskeletal: Negative for arthralgias and myalgias.   Skin: Negative for rash and wound.   Neurological: Negative for dizziness, weakness and headaches.   Psychiatric/Behavioral: Negative for dysphoric mood and suicidal ideas. The patient is not nervous/anxious.        Compared to one year ago, the patient feels her physical health is the same.  Compared to one year ago, the patient feels her mental health is the same.    Reviewed chart for potential of high risk medication in the elderly: yes  Reviewed chart for potential of harmful drug interactions in the elderly:yes    Objective         Vitals:    06/14/21 1013   BP: 142/79   Pulse: 83   Temp: 97.7 °F (36.5 °C)   SpO2: 99%   Weight: 62.7 kg (138 lb 3.2 oz)   Height: 157.5 cm (62\")   PainSc: 0-No pain       Body mass index is 25.28 kg/m².  Discussed the patient's BMI with her. The BMI is in the acceptable range.    Physical Exam  Vitals and nursing note reviewed.   Constitutional:       General: She is not in acute distress.     Appearance: She is well-developed. She is not ill-appearing.   HENT:      Head: " Normocephalic and atraumatic.      Right Ear: Tympanic membrane, ear canal and external ear normal.      Left Ear: Tympanic membrane, ear canal and external ear normal.      Mouth/Throat:      Mouth: Mucous membranes are moist.      Pharynx: Uvula midline. No posterior oropharyngeal erythema.   Eyes:      General: No scleral icterus.        Right eye: No discharge.         Left eye: No discharge.      Extraocular Movements: Extraocular movements intact.      Conjunctiva/sclera: Conjunctivae normal.      Pupils: Pupils are equal, round, and reactive to light.   Neck:      Thyroid: No thyromegaly.   Cardiovascular:      Rate and Rhythm: Normal rate and regular rhythm.      Heart sounds: Normal heart sounds. No murmur heard.     Pulmonary:      Effort: Pulmonary effort is normal.      Breath sounds: Normal breath sounds.   Abdominal:      General: Bowel sounds are normal.      Palpations: Abdomen is soft.      Tenderness: There is no abdominal tenderness.   Musculoskeletal:         General: No deformity.      Cervical back: Neck supple.      Comments: Gait smooth and steady   Lymphadenopathy:      Cervical: No cervical adenopathy.   Skin:     General: Skin is warm and dry.   Neurological:      General: No focal deficit present.      Mental Status: She is alert and oriented to person, place, and time.      Cranial Nerves: No cranial nerve deficit.   Psychiatric:         Mood and Affect: Mood normal.         Behavior: Behavior normal.         Thought Content: Thought content normal.         Judgment: Judgment normal.               Assessment/Plan   Medicare Risks and Personalized Health Plan  CMS Preventative Services Quick Reference  Advance Directive Discussion  Breast Cancer/Mammogram Screening  Osteoporosis Risk    The above risks/problems have been discussed with the patient.  Pertinent information has been shared with the patient in the After Visit Summary.  Follow up plans and orders are seen below in the  Assessment/Plan Section.    Diagnoses and all orders for this visit:    1. Encounter for annual wellness exam in Medicare patient (Primary)    2. Gastroesophageal reflux disease without esophagitis  -     CBC & Differential  -     Comprehensive Metabolic Panel    3. Multiple thyroid nodules    4. Polyarthritis  -     Comprehensive Metabolic Panel    5. Blood loss anemia  -     Ferritin    6. Screening for lipoid disorders  -     Lipid Panel With LDL / HDL Ratio    7. Urinary frequency  -     Urinalysis With Culture If Indicated -       As part of wellness and prevention, the following topics were discussed with the patient:   Nutrition-diet high in fresh/fozen veges, lower in carbs, unsaturated fats, and higher in lean proteins, adequate hydration   Physical activity/regular exercise-150 mins per week of moderate activity that increases HR and is enjoyable to lower stress and improve CVD     Healthy weight-at goal BMI and stable    Injury prevention-covid safety, back and joint health    Substance misuse/abuse-min etoh use  Dental health-recommend twice per year dental cleans and daily flossing to lower inflammation in body   Mental health-stress mgmt and sleep hygiene   Immunization-recommend covid vaccine when available  Will have bone density study done this January per GYN.  She will have a mammo done this January per GYN  Up-to-date on colonoscopy  Advanced directive discussed      Follow Up:  Return in about 6 months (around 12/14/2021).           An After Visit Summary and PPPS were given to the patient.

## 2021-06-15 LAB
ALBUMIN SERPL-MCNC: 4.7 G/DL (ref 3.5–5.2)
ALBUMIN/GLOB SERPL: 2.2 G/DL
ALP SERPL-CCNC: 70 U/L (ref 39–117)
ALT SERPL-CCNC: 14 U/L (ref 1–33)
APPEARANCE UR: CLEAR
AST SERPL-CCNC: 14 U/L (ref 1–32)
BACTERIA #/AREA URNS HPF: NORMAL /HPF
BASOPHILS # BLD AUTO: 0.06 10*3/MM3 (ref 0–0.2)
BASOPHILS NFR BLD AUTO: 0.8 % (ref 0–1.5)
BILIRUB SERPL-MCNC: 0.2 MG/DL (ref 0–1.2)
BILIRUB UR QL STRIP: NEGATIVE
BUN SERPL-MCNC: 13 MG/DL (ref 8–23)
BUN/CREAT SERPL: 21.7 (ref 7–25)
CALCIUM SERPL-MCNC: 10.1 MG/DL (ref 8.6–10.5)
CASTS URNS QL MICRO: NORMAL /LPF
CHLORIDE SERPL-SCNC: 102 MMOL/L (ref 98–107)
CHOLEST SERPL-MCNC: 234 MG/DL (ref 0–200)
CO2 SERPL-SCNC: 26.1 MMOL/L (ref 22–29)
COLOR UR: YELLOW
CREAT SERPL-MCNC: 0.6 MG/DL (ref 0.57–1)
EOSINOPHIL # BLD AUTO: 0.09 10*3/MM3 (ref 0–0.4)
EOSINOPHIL NFR BLD AUTO: 1.2 % (ref 0.3–6.2)
EPI CELLS #/AREA URNS HPF: NORMAL /HPF (ref 0–10)
ERYTHROCYTE [DISTWIDTH] IN BLOOD BY AUTOMATED COUNT: 14.1 % (ref 12.3–15.4)
FERRITIN SERPL-MCNC: 5.63 NG/ML (ref 13–150)
GLOBULIN SER CALC-MCNC: 2.1 GM/DL
GLUCOSE SERPL-MCNC: 81 MG/DL (ref 65–99)
GLUCOSE UR QL: NEGATIVE
HCT VFR BLD AUTO: 36.6 % (ref 34–46.6)
HDLC SERPL-MCNC: 105 MG/DL (ref 40–60)
HGB BLD-MCNC: 11.1 G/DL (ref 12–15.9)
HGB UR QL STRIP: NEGATIVE
IMM GRANULOCYTES # BLD AUTO: 0.02 10*3/MM3 (ref 0–0.05)
IMM GRANULOCYTES NFR BLD AUTO: 0.3 % (ref 0–0.5)
KETONES UR QL STRIP: NEGATIVE
LDLC SERPL CALC-MCNC: 116 MG/DL (ref 0–100)
LDLC/HDLC SERPL: 1.09 {RATIO}
LEUKOCYTE ESTERASE UR QL STRIP: NEGATIVE
LYMPHOCYTES # BLD AUTO: 1.53 10*3/MM3 (ref 0.7–3.1)
LYMPHOCYTES NFR BLD AUTO: 20.1 % (ref 19.6–45.3)
MCH RBC QN AUTO: 24.8 PG (ref 26.6–33)
MCHC RBC AUTO-ENTMCNC: 30.3 G/DL (ref 31.5–35.7)
MCV RBC AUTO: 81.9 FL (ref 79–97)
MICRO URNS: NORMAL
MICRO URNS: NORMAL
MONOCYTES # BLD AUTO: 0.62 10*3/MM3 (ref 0.1–0.9)
MONOCYTES NFR BLD AUTO: 8.1 % (ref 5–12)
NEUTROPHILS # BLD AUTO: 5.31 10*3/MM3 (ref 1.7–7)
NEUTROPHILS NFR BLD AUTO: 69.5 % (ref 42.7–76)
NITRITE UR QL STRIP: NEGATIVE
NRBC BLD AUTO-RTO: 0 /100 WBC (ref 0–0.2)
PH UR STRIP: 7 [PH] (ref 5–7.5)
PLATELET # BLD AUTO: 383 10*3/MM3 (ref 140–450)
POTASSIUM SERPL-SCNC: 5.2 MMOL/L (ref 3.5–5.2)
PROT SERPL-MCNC: 6.8 G/DL (ref 6–8.5)
PROT UR QL STRIP: NEGATIVE
RBC # BLD AUTO: 4.47 10*6/MM3 (ref 3.77–5.28)
RBC #/AREA URNS HPF: NORMAL /HPF (ref 0–2)
SODIUM SERPL-SCNC: 140 MMOL/L (ref 136–145)
SP GR UR: 1.01 (ref 1–1.03)
TRIGL SERPL-MCNC: 75 MG/DL (ref 0–150)
TSH SERPL DL<=0.005 MIU/L-ACNC: 1.08 UIU/ML (ref 0.27–4.2)
URINALYSIS REFLEX: NORMAL
UROBILINOGEN UR STRIP-MCNC: 0.2 MG/DL (ref 0.2–1)
VLDLC SERPL CALC-MCNC: 13 MG/DL (ref 5–40)
WBC # BLD AUTO: 7.63 10*3/MM3 (ref 3.4–10.8)
WBC #/AREA URNS HPF: NORMAL /HPF (ref 0–5)

## 2021-06-22 ENCOUNTER — CLINICAL SUPPORT (OUTPATIENT)
Dept: FAMILY MEDICINE CLINIC | Facility: CLINIC | Age: 71
End: 2021-06-22

## 2021-06-22 DIAGNOSIS — D50.0 BLOOD LOSS ANEMIA: Primary | ICD-10-CM

## 2021-06-22 LAB
EXPIRATION DATE 2: ABNORMAL
EXPIRATION DATE 3: ABNORMAL
EXPIRATION DATE: ABNORMAL
GASTROCULT GAST QL: POSITIVE
HEMOCCULT SP2 STL QL: POSITIVE
HEMOCCULT SP3 STL QL: NEGATIVE
Lab: ABNORMAL

## 2021-06-22 PROCEDURE — 82274 ASSAY TEST FOR BLOOD FECAL: CPT | Performed by: NURSE PRACTITIONER

## 2021-06-29 ENCOUNTER — TRANSCRIBE ORDERS (OUTPATIENT)
Dept: LAB | Facility: SURGERY CENTER | Age: 71
End: 2021-06-29

## 2021-06-29 ENCOUNTER — OFFICE VISIT (OUTPATIENT)
Dept: SURGERY | Facility: CLINIC | Age: 71
End: 2021-06-29

## 2021-06-29 VITALS
BODY MASS INDEX: 25.32 KG/M2 | HEART RATE: 87 BPM | OXYGEN SATURATION: 98 % | SYSTOLIC BLOOD PRESSURE: 118 MMHG | HEIGHT: 62 IN | DIASTOLIC BLOOD PRESSURE: 72 MMHG | WEIGHT: 137.6 LBS

## 2021-06-29 DIAGNOSIS — D50.8 OTHER IRON DEFICIENCY ANEMIA: ICD-10-CM

## 2021-06-29 DIAGNOSIS — R19.5 OCCULT BLOOD IN STOOLS: Primary | ICD-10-CM

## 2021-06-29 DIAGNOSIS — Z01.818 OTHER SPECIFIED PRE-OPERATIVE EXAMINATION: Primary | ICD-10-CM

## 2021-06-29 PROCEDURE — 99214 OFFICE O/P EST MOD 30 MIN: CPT | Performed by: SURGERY

## 2021-06-29 RX ORDER — D-METHORPHAN/PE/ACETAMINOPHEN 10-5-325MG
CAPSULE ORAL
COMMUNITY
Start: 2021-06-15 | End: 2022-04-28

## 2021-07-28 ENCOUNTER — LAB (OUTPATIENT)
Dept: LAB | Facility: SURGERY CENTER | Age: 71
End: 2021-07-28

## 2021-07-28 DIAGNOSIS — Z01.818 OTHER SPECIFIED PRE-OPERATIVE EXAMINATION: ICD-10-CM

## 2021-07-28 LAB — SARS-COV-2 ORF1AB RESP QL NAA+PROBE: NOT DETECTED

## 2021-07-28 PROCEDURE — C9803 HOPD COVID-19 SPEC COLLECT: HCPCS

## 2021-07-28 PROCEDURE — U0004 COV-19 TEST NON-CDC HGH THRU: HCPCS | Performed by: SURGERY

## 2021-07-28 PROCEDURE — U0005 INFEC AGEN DETEC AMPLI PROBE: HCPCS | Performed by: SURGERY

## 2021-07-30 ENCOUNTER — ANESTHESIA (OUTPATIENT)
Dept: SURGERY | Facility: SURGERY CENTER | Age: 71
End: 2021-07-30

## 2021-07-30 ENCOUNTER — HOSPITAL ENCOUNTER (OUTPATIENT)
Facility: SURGERY CENTER | Age: 71
Setting detail: HOSPITAL OUTPATIENT SURGERY
Discharge: HOME | End: 2021-07-30
Attending: SURGERY | Admitting: SURGERY

## 2021-07-30 ENCOUNTER — ANESTHESIA EVENT (OUTPATIENT)
Dept: SURGERY | Facility: SURGERY CENTER | Age: 71
End: 2021-07-30

## 2021-07-30 VITALS
TEMPERATURE: 98.2 F | RESPIRATION RATE: 16 BRPM | HEART RATE: 72 BPM | SYSTOLIC BLOOD PRESSURE: 130 MMHG | WEIGHT: 135.2 LBS | HEIGHT: 62 IN | DIASTOLIC BLOOD PRESSURE: 84 MMHG | BODY MASS INDEX: 24.88 KG/M2 | OXYGEN SATURATION: 95 %

## 2021-07-30 DIAGNOSIS — R19.5 OCCULT BLOOD IN STOOLS: ICD-10-CM

## 2021-07-30 PROCEDURE — 0DB98ZX EXCISION OF DUODENUM, VIA NATURAL OR ARTIFICIAL OPENING ENDOSCOPIC, DIAGNOSTIC: ICD-10-PCS | Performed by: SURGERY

## 2021-07-30 PROCEDURE — 43239 EGD BIOPSY SINGLE/MULTIPLE: CPT | Performed by: SURGERY

## 2021-07-30 PROCEDURE — 45378 DIAGNOSTIC COLONOSCOPY: CPT | Performed by: SURGERY

## 2021-07-30 PROCEDURE — 88305 TISSUE EXAM BY PATHOLOGIST: CPT | Performed by: SURGERY

## 2021-07-30 PROCEDURE — S0260 H&P FOR SURGERY: HCPCS | Performed by: SURGERY

## 2021-07-30 PROCEDURE — 25010000002 PROPOFOL 10 MG/ML EMULSION: Performed by: NURSE ANESTHETIST, CERTIFIED REGISTERED

## 2021-07-30 PROCEDURE — OUTSIDEPOS PR OUTSIDE POS PLACEHOLDER: Performed by: SURGERY

## 2021-07-30 PROCEDURE — 0DJD8ZZ INSPECTION OF LOWER INTESTINAL TRACT, VIA NATURAL OR ARTIFICIAL OPENING ENDOSCOPIC: ICD-10-PCS | Performed by: SURGERY

## 2021-07-30 RX ORDER — LIDOCAINE HYDROCHLORIDE 20 MG/ML
INJECTION, SOLUTION INFILTRATION; PERINEURAL AS NEEDED
Status: DISCONTINUED | OUTPATIENT
Start: 2021-07-30 | End: 2021-07-30 | Stop reason: SURG

## 2021-07-30 RX ORDER — LIDOCAINE HYDROCHLORIDE 10 MG/ML
0.5 INJECTION, SOLUTION INFILTRATION; PERINEURAL ONCE AS NEEDED
Status: DISCONTINUED | OUTPATIENT
Start: 2021-07-30 | End: 2021-07-30 | Stop reason: HOSPADM

## 2021-07-30 RX ORDER — MAGNESIUM HYDROXIDE 1200 MG/15ML
LIQUID ORAL AS NEEDED
Status: DISCONTINUED | OUTPATIENT
Start: 2021-07-30 | End: 2021-07-30 | Stop reason: HOSPADM

## 2021-07-30 RX ORDER — PROPOFOL 10 MG/ML
VIAL (ML) INTRAVENOUS AS NEEDED
Status: DISCONTINUED | OUTPATIENT
Start: 2021-07-30 | End: 2021-07-30 | Stop reason: SURG

## 2021-07-30 RX ORDER — SODIUM CHLORIDE 0.9 % (FLUSH) 0.9 %
10 SYRINGE (ML) INJECTION AS NEEDED
Status: DISCONTINUED | OUTPATIENT
Start: 2021-07-30 | End: 2021-07-30 | Stop reason: HOSPADM

## 2021-07-30 RX ORDER — SODIUM CHLORIDE, SODIUM LACTATE, POTASSIUM CHLORIDE, CALCIUM CHLORIDE 600; 310; 30; 20 MG/100ML; MG/100ML; MG/100ML; MG/100ML
1000 INJECTION, SOLUTION INTRAVENOUS CONTINUOUS
Status: DISCONTINUED | OUTPATIENT
Start: 2021-07-30 | End: 2021-07-30 | Stop reason: HOSPADM

## 2021-07-30 RX ADMIN — PROPOFOL 100 MG: 10 INJECTION, EMULSION INTRAVENOUS at 11:50

## 2021-07-30 RX ADMIN — PROPOFOL 200 MCG/KG/MIN: 10 INJECTION, EMULSION INTRAVENOUS at 11:50

## 2021-07-30 RX ADMIN — GLYCOPYRROLATE 0.2 MG: 0.2 INJECTION, SOLUTION INTRAMUSCULAR; INTRAVITREAL at 12:05

## 2021-07-30 RX ADMIN — SODIUM CHLORIDE, POTASSIUM CHLORIDE, SODIUM LACTATE AND CALCIUM CHLORIDE 1000 ML: 600; 310; 30; 20 INJECTION, SOLUTION INTRAVENOUS at 11:32

## 2021-07-30 RX ADMIN — LIDOCAINE HYDROCHLORIDE 60 MG: 20 INJECTION, SOLUTION INFILTRATION; PERINEURAL at 11:50

## 2021-07-30 NOTE — ANESTHESIA POSTPROCEDURE EVALUATION
"Patient: Ashley Comer    Procedure Summary     Date: 07/30/21 Room / Location: [facility]    Anesthesia Start: 1145 Anesthesia Stop: 1224    Procedures:       COLONOSCOPY (N/A )      ESOPHAGOGASTRODUODENOSCOPY (N/A ) Diagnosis:       Occult blood in stools      (Occult blood in stools [R19.5])    Surgeons: Mini Meraz MD Provider: Mai Wade MD    Anesthesia Type: MAC ASA Status: 2          Anesthesia Type: MAC    Vitals  Vitals Value Taken Time   /84 07/30/21 1231   Temp 36.8 °C (98.2 °F) 07/30/21 1221   Pulse 72 07/30/21 1231   Resp 16 07/30/21 1231   SpO2 95 % 07/30/21 1231           Post Anesthesia Care and Evaluation    Patient location during evaluation: PHASE II  Patient participation: complete - patient participated  Level of consciousness: awake  Pain management: adequate  Airway patency: patent  Anesthetic complications: No anesthetic complications    Cardiovascular status: acceptable  Respiratory status: acceptable  Hydration status: acceptable    Comments: /84 (BP Location: Right arm)   Pulse 72   Temp 36.8 °C (98.2 °F) (Infrared)   Resp 16   Ht 157.5 cm (62\")   Wt 61.3 kg (135 lb 3.2 oz)   LMP  (LMP Unknown)   SpO2 95%   BMI 24.73 kg/m²       "

## 2021-07-30 NOTE — ANESTHESIA PREPROCEDURE EVALUATION
Anesthesia Evaluation     Patient summary reviewed and Nursing notes reviewed   NPO Solid Status: > 8 hours  NPO Liquid Status: > 2 hours           Airway   Mallampati: I  Dental - normal exam         Pulmonary - normal exam   (+) a smoker Former,   Cardiovascular - normal exam    ECG reviewed    (+) hyperlipidemia,       Neuro/Psych  (+) headaches, numbness,     GI/Hepatic/Renal/Endo    (+)  GERD,      Musculoskeletal     Abdominal    Substance History - negative use     OB/GYN          Other   arthritis,    history of cancer                    Anesthesia Plan    ASA 2     MAC       Anesthetic plan, all risks, benefits, and alternatives have been provided, discussed and informed consent has been obtained with: patient.

## 2021-08-06 DIAGNOSIS — D64.9 ANEMIA, UNSPECIFIED TYPE: Primary | ICD-10-CM

## 2021-08-09 ENCOUNTER — TELEPHONE (OUTPATIENT)
Dept: ONCOLOGY | Facility: CLINIC | Age: 71
End: 2021-08-09

## 2021-08-09 NOTE — TELEPHONE ENCOUNTER
Caller: Ashley Comer    Relationship to patient: Self    Best call back number: [phone]    Chief complaint: NEEDS FOLLOW UP CONSULT WOULD LIKE TO SEE DR HUI PER NOTE 08/06 (PATIENT MESSAGE) HOWEVER DOES NOT STATE FOLLOW UP CONSULT TIME FRAME.     Type of visit: CONSULT     Requested date:     MORNINGS ARE  BETTER,, ANYTIME     WOULD PREFER [facility] LOCATION         8/25-8/28 WILL BE OUT OF TOWN         Additional notes:      CAN LEAVE VOICEMAIL IF NO ANSWER

## 2021-08-12 ENCOUNTER — LAB (OUTPATIENT)
Dept: LAB | Facility: HOSPITAL | Age: 71
End: 2021-08-12

## 2021-08-12 ENCOUNTER — OFFICE VISIT (OUTPATIENT)
Dept: ONCOLOGY | Facility: CLINIC | Age: 71
End: 2021-08-12

## 2021-08-12 VITALS
SYSTOLIC BLOOD PRESSURE: 122 MMHG | HEART RATE: 71 BPM | DIASTOLIC BLOOD PRESSURE: 75 MMHG | WEIGHT: 137.7 LBS | OXYGEN SATURATION: 98 % | RESPIRATION RATE: 16 BRPM | HEIGHT: 62 IN | BODY MASS INDEX: 25.34 KG/M2 | TEMPERATURE: 96.6 F

## 2021-08-12 DIAGNOSIS — C49.A3 GIST (GASTROINTESTINAL STROMAL TUMOR) OF SMALL BOWEL, MALIGNANT (HCC): ICD-10-CM

## 2021-08-12 DIAGNOSIS — D50.9 IRON DEFICIENCY ANEMIA, UNSPECIFIED IRON DEFICIENCY ANEMIA TYPE: ICD-10-CM

## 2021-08-12 DIAGNOSIS — C49.A3 GIST (GASTROINTESTINAL STROMAL TUMOR) OF SMALL BOWEL, MALIGNANT (HCC): Primary | ICD-10-CM

## 2021-08-12 LAB
ALBUMIN SERPL-MCNC: 4.4 G/DL (ref 3.5–5.2)
ALBUMIN/GLOB SERPL: 1.8 G/DL (ref 1.1–2.4)
ALP SERPL-CCNC: 73 U/L (ref 38–116)
ALT SERPL W P-5'-P-CCNC: 18 U/L (ref 0–33)
ANION GAP SERPL CALCULATED.3IONS-SCNC: 12.5 MMOL/L (ref 5–15)
AST SERPL-CCNC: 24 U/L (ref 0–32)
BASOPHILS # BLD AUTO: 0.08 10*3/MM3 (ref 0–0.2)
BASOPHILS NFR BLD AUTO: 0.9 % (ref 0–1.5)
BILIRUB SERPL-MCNC: 0.3 MG/DL (ref 0.2–1.2)
BUN SERPL-MCNC: 16 MG/DL (ref 6–20)
BUN/CREAT SERPL: 22.9 (ref 7.3–30)
CALCIUM SPEC-SCNC: 9.9 MG/DL (ref 8.5–10.2)
CHLORIDE SERPL-SCNC: 102 MMOL/L (ref 98–107)
CO2 SERPL-SCNC: 25.5 MMOL/L (ref 22–29)
CREAT SERPL-MCNC: 0.7 MG/DL (ref 0.6–1.1)
DEPRECATED RDW RBC AUTO: 55 FL (ref 37–54)
EOSINOPHIL # BLD AUTO: 0.14 10*3/MM3 (ref 0–0.4)
EOSINOPHIL NFR BLD AUTO: 1.6 % (ref 0.3–6.2)
ERYTHROCYTE [DISTWIDTH] IN BLOOD BY AUTOMATED COUNT: 18.1 % (ref 12.3–15.4)
FERRITIN SERPL-MCNC: 29.4 NG/ML (ref 13–150)
FOLATE SERPL-MCNC: >20 NG/ML (ref 4.78–24.2)
GFR SERPL CREATININE-BSD FRML MDRD: 82 ML/MIN/1.73
GLOBULIN UR ELPH-MCNC: 2.5 GM/DL (ref 1.8–3.5)
GLUCOSE SERPL-MCNC: 120 MG/DL (ref 74–124)
HCT VFR BLD AUTO: 41 % (ref 34–46.6)
HGB BLD-MCNC: 13.1 G/DL (ref 12–15.9)
HGB RETIC QN AUTO: 33.4 PG (ref 29.8–36.1)
IMM GRANULOCYTES # BLD AUTO: 0.02 10*3/MM3 (ref 0–0.05)
IMM GRANULOCYTES NFR BLD AUTO: 0.2 % (ref 0–0.5)
IMM RETICS NFR: 6.5 % (ref 3–15.8)
IRON 24H UR-MRATE: 80 MCG/DL (ref 37–145)
IRON SATN MFR SERPL: 21 % (ref 14–48)
LYMPHOCYTES # BLD AUTO: 1.43 10*3/MM3 (ref 0.7–3.1)
LYMPHOCYTES NFR BLD AUTO: 16.3 % (ref 19.6–45.3)
MCH RBC QN AUTO: 27.1 PG (ref 26.6–33)
MCHC RBC AUTO-ENTMCNC: 32 G/DL (ref 31.5–35.7)
MCV RBC AUTO: 84.7 FL (ref 79–97)
MONOCYTES # BLD AUTO: 0.61 10*3/MM3 (ref 0.1–0.9)
MONOCYTES NFR BLD AUTO: 6.9 % (ref 5–12)
NEUTROPHILS NFR BLD AUTO: 6.52 10*3/MM3 (ref 1.7–7)
NEUTROPHILS NFR BLD AUTO: 74.1 % (ref 42.7–76)
NRBC BLD AUTO-RTO: 0 /100 WBC (ref 0–0.2)
PLATELET # BLD AUTO: 314 10*3/MM3 (ref 140–450)
PMV BLD AUTO: 8.9 FL (ref 6–12)
POTASSIUM SERPL-SCNC: 3.8 MMOL/L (ref 3.5–4.7)
PROT SERPL-MCNC: 6.9 G/DL (ref 6.3–8)
RBC # BLD AUTO: 4.84 10*6/MM3 (ref 3.77–5.28)
RETICS # AUTO: 0.06 10*6/MM3 (ref 0.02–0.13)
RETICS/RBC NFR AUTO: 1.24 % (ref 0.7–1.9)
SODIUM SERPL-SCNC: 140 MMOL/L (ref 134–145)
TIBC SERPL-MCNC: 378 MCG/DL (ref 249–505)
TRANSFERRIN SERPL-MCNC: 270 MG/DL (ref 200–360)
VIT B12 BLD-MCNC: 874 PG/ML (ref 211–946)
WBC # BLD AUTO: 8.8 10*3/MM3 (ref 3.4–10.8)

## 2021-08-12 PROCEDURE — 80053 COMPREHEN METABOLIC PANEL: CPT

## 2021-08-12 PROCEDURE — 99214 OFFICE O/P EST MOD 30 MIN: CPT | Performed by: INTERNAL MEDICINE

## 2021-08-12 PROCEDURE — 85046 RETICYTE/HGB CONCENTRATE: CPT | Performed by: INTERNAL MEDICINE

## 2021-08-12 PROCEDURE — 82607 VITAMIN B-12: CPT | Performed by: INTERNAL MEDICINE

## 2021-08-12 PROCEDURE — 85025 COMPLETE CBC W/AUTO DIFF WBC: CPT

## 2021-08-12 PROCEDURE — 82728 ASSAY OF FERRITIN: CPT | Performed by: INTERNAL MEDICINE

## 2021-08-12 PROCEDURE — 36415 COLL VENOUS BLD VENIPUNCTURE: CPT

## 2021-08-12 PROCEDURE — 82746 ASSAY OF FOLIC ACID SERUM: CPT | Performed by: INTERNAL MEDICINE

## 2021-08-12 PROCEDURE — 84466 ASSAY OF TRANSFERRIN: CPT | Performed by: INTERNAL MEDICINE

## 2021-08-12 PROCEDURE — 83540 ASSAY OF IRON: CPT | Performed by: INTERNAL MEDICINE

## 2021-08-12 NOTE — PROGRESS NOTES
Subjective   REASON FOR FOLLOW UP:   1.  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  2.  Iron deficiency anemia due to gastritis    HISTORY OF PRESENT ILLNESS:     History of Present Illness     Mrs. Comer is a 70 y.o. female here today for annual follow-up visit to our office.  She had previously been followed in our office for resected GIST and had completed 5 years of surveillance with a CT scan in January 2021.  We were planning to see her on an as-needed basis after she completed her 5 years of surveillance.  We did not schedule routine appointment but she called and scheduled another appointment due to a new problem.    She was noted to be anemic on labs from June 2021 with a ferritin level of 5.  She started on over-the-counter [place] vitamins with iron and has been taking 1 a day.  She also underwent upper and lower GI endoscopy with her surgeon Dr. Mini Meraz.  The EGD did show gastritis most likely related to her use of nonsteroidal anti-inflammatory drugs.    She has stopped taking Naprosyn and has continued taking omeprazole daily.  Her repeat hemoglobin in our office today is improved from 11.1 g/dL up to 13 g/dL.  Repeat iron studies and ferritin level are pending    She looks great in the office today with no new symptoms.  She reports that she is tolerating the [place] vitamins without any noticeable side effects.    Past Oncologic History  She had been seen in consultation during her admission to [facility] from 2/20/2016 to 2/26/2016.  She was admitted at that time with symptomatic anemia and GI bleeding.  On endoscopy the blood appeared to be coming from the region of the appendix and she underwent a laparoscopic appendectomy with Dr. Meraz on 2/22/2016.  Also at the same procedure she was found to have a tumor within the small bowel and had a partial small bowel resection with reanastomosis.  The pathology from that specimen showed a gastrointestinal stromal tumor (GIST) .   The tumor was 4.5 cm and appeared to be low-grade with 0 mitotic figures per high-powered field.    We discussed with Miss Comer at that time that we would not recommend any postop adjuvant Gleevec therapy as her prognosis was very good with surgery alone.  We did however discuss some follow-up in the office with plans to perform surveillance CT scans for a few years after surgery.    She is here today for annual follow-up and review of surveillance scans.  She is now 5 years out from her surgery and seems to be doing great.  The scans performed on  1/25/2021 do not show any evidence of recurrent or metastatic disease radiographically.  She also had endoscopic surveillance with Dr. Meraz in August 2020 with no major findings.      Past Medical History, Past Surgical History, Social History, Family History have been reviewed and are without significant changes except as mentioned.    Review of Systems   Constitutional: Negative for activity change, appetite change, fatigue, fever and unexpected weight change.   HENT: Negative for hearing loss, nosebleeds, trouble swallowing and voice change.    Eyes: Negative for visual disturbance.   Respiratory: Negative for cough, shortness of breath and wheezing.    Cardiovascular: Negative for chest pain and palpitations.   Gastrointestinal: Negative for abdominal pain, diarrhea, nausea and vomiting.   Genitourinary: Negative for difficulty urinating, frequency, hematuria and urgency.   Musculoskeletal: Negative for back pain and neck pain.   Skin: Negative for rash.   Neurological: Negative for dizziness, seizures, syncope and headaches.   Hematological: Negative for adenopathy. Does not bruise/bleed easily.   Psychiatric/Behavioral: Negative for behavioral problems. The patient is not nervous/anxious.       A comprehensive 14 point review of systems was performed and was negative except as mentioned.    Medications:  The current medication list was reviewed in the  "EMR    ALLERGIES:    Allergies   Allergen Reactions   • Penicillins Rash     Rash over whole body       Objective      Vitals:    08/12/21 1051   BP: 122/75   Pulse: 71   Resp: 16   Temp: 96.6 °F (35.9 °C)   TempSrc: Skin   SpO2: 98%   Weight: 62.5 kg (137 lb 11.2 oz)   Height: 157.5 cm (62.01\")   PainSc: 0-No pain     Current Status 1/30/2020   ECOG score 0       Physical Exam   Constitutional: She is oriented to person, place, and time. She appears well-developed. No distress.   HENT:   Head: Normocephalic.   Eyes: Pupils are equal, round, and reactive to light. Conjunctivae are normal. No scleral icterus.   Neck: No JVD present. No thyromegaly present.   Cardiovascular: Normal rate and regular rhythm. Exam reveals no gallop and no friction rub.   No murmur heard.  Pulmonary/Chest: Effort normal and breath sounds normal. She has no wheezes. She has no rales.   Abdominal: Soft. She exhibits no distension and no mass. There is no abdominal tenderness.   Musculoskeletal: Normal range of motion. No deformity.   Lymphadenopathy:     She has no cervical adenopathy.   Neurological: She is alert and oriented to person, place, and time. She has normal reflexes. No cranial nerve deficit.   Skin: Skin is warm and dry. No rash noted. No erythema.   Psychiatric: Her behavior is normal. Judgment normal.      I have reexamined the patient and the results are consistent with the previously documented exam. Vargas Mejias MD     RECENT LABS:  Hematology WBC   Date Value Ref Range Status   08/12/2021 8.80 3.40 - 10.80 10*3/mm3 Final   06/14/2021 7.63 3.40 - 10.80 10*3/mm3 Final     RBC   Date Value Ref Range Status   08/12/2021 4.84 3.77 - 5.28 10*6/mm3 Final   06/14/2021 4.47 3.77 - 5.28 10*6/mm3 Final     Hemoglobin   Date Value Ref Range Status   08/12/2021 13.1 12.0 - 15.9 g/dL Final     Hematocrit   Date Value Ref Range Status   08/12/2021 41.0 34.0 - 46.6 % Final     Platelets   Date Value Ref Range Status   08/12/2021 314 " 140 - 450 10*3/mm3 Final              Lab Results   Component Value Date    IRON 105 01/23/2019    TIBC 350 01/23/2019    FERRITIN 5.63 (L) 06/14/2021       Lab Results   Component Value Date    GLUCOSE 68 (L) 01/25/2021    BUN 13 06/14/2021    CREATININE 0.60 06/14/2021    EGFRIFNONA 99 06/14/2021    EGFRIFAFRI 119 06/14/2021    BCR 21.7 06/14/2021    CO2 26.1 06/14/2021    CALCIUM 10.1 06/14/2021    PROTENTOTREF 6.8 06/14/2021    ALBUMIN 4.70 06/14/2021    LABIL2 2.2 06/14/2021    AST 14 06/14/2021    ALT 14 06/14/2021     EGD PATHOLOGY 7/30/2021  Final Diagnosis   1. Antrum, Biopsy: Benign gastric mucosa with                A. Mild chronic inflammation.               B. Features suggestive of chemical gastritis.   C. No intestinal metaplasia.               D. No Helicobacter pylori.      2. Gastroesophageal Junction, Biopsy: Benign squamous and gastric mucosa with                A. Chronic inflammation of the gastric mucosa.               B. No intestinal metaplasia.               C. No Helicobacter pylori.       IMAGING  CT OF THE CHEST ABDOMEN AND PELVIS WITH CONTRAST 1/25/2021   CONCLUSION: Stable CT scan of the chest, no acute intrathoracic  abnormality or suspicious pulmonary lesion has developed.  CONCLUSION: Stable CT scan of the abdomen and pelvis, specifically no  change in the small bowel anastomosis. No indication of local tumor  recurrence nor visceral metastasis.      Assessment/Plan      1.  Gastrointestinal stromal tumor of the small bowel resected 2/22/2016 by Dr. Meraz with good margins.  The tumor appeared to be low-grade with good prognostic features.  Patient continues to do well and CT scans in January 2021 showed no definite evidence of recurrent or metastatic disease at 5 years out from surgery.  2.  Iron deficiency anemia due to GI blood loss.  As noted above, her EGD showed gastritis.  She has been taking nonsteroidals and also had been donating blood.  She since has stopped taking  Naprosyn and we instructed her to stop donating blood at this time also.  She is tolerating oral iron supplementation in the form of [place] vitamins.    Plan    1.  We reviewed the latest EGD and lab results with the patient.   2.  We encouraged her to continue to avoid all nonsteroidal anti-inflammatory drugs.  3.  We instructed her to continue the [place] vitamins with iron but to increase her daily intake to 2 chewables a day.  4.  We have drawn repeat iron and ferritin levels today.  We will review those when they become available and can contact the patient if we have any other new recommendations based on those results.  5.  We have scheduled her to return to the office in 3 months with repeat labs drawn 1 week prior to that visit including a CBC iron panel and ferritin.  6.  At this point, she is now over 5 years out from her surgery for small bowel GIST and we will not schedule routine scans unless she develops new symptoms or problems..                    8/12/2021      CC:

## 2021-09-07 DIAGNOSIS — K21.9 GASTROESOPHAGEAL REFLUX DISEASE WITHOUT ESOPHAGITIS: ICD-10-CM

## 2021-09-07 RX ORDER — OMEPRAZOLE 40 MG/1
40 CAPSULE, DELAYED RELEASE ORAL DAILY
Qty: 90 CAPSULE | Refills: 0 | Status: SHIPPED | OUTPATIENT
Start: 2021-09-07 | End: 2021-12-02

## 2021-10-27 ENCOUNTER — LAB (OUTPATIENT)
Dept: LAB | Facility: HOSPITAL | Age: 71
End: 2021-10-27

## 2021-10-27 DIAGNOSIS — D50.9 IRON DEFICIENCY ANEMIA, UNSPECIFIED IRON DEFICIENCY ANEMIA TYPE: ICD-10-CM

## 2021-10-27 DIAGNOSIS — C49.A3 GIST (GASTROINTESTINAL STROMAL TUMOR) OF SMALL BOWEL, MALIGNANT (HCC): ICD-10-CM

## 2021-10-27 LAB
BASOPHILS # BLD AUTO: 0.05 10*3/MM3 (ref 0–0.2)
BASOPHILS NFR BLD AUTO: 0.8 % (ref 0–1.5)
DEPRECATED RDW RBC AUTO: 44.5 FL (ref 37–54)
EOSINOPHIL # BLD AUTO: 0.15 10*3/MM3 (ref 0–0.4)
EOSINOPHIL NFR BLD AUTO: 2.3 % (ref 0.3–6.2)
ERYTHROCYTE [DISTWIDTH] IN BLOOD BY AUTOMATED COUNT: 13.3 % (ref 12.3–15.4)
FERRITIN SERPL-MCNC: 72.5 NG/ML (ref 13–150)
HCT VFR BLD AUTO: 44.7 % (ref 34–46.6)
HGB BLD-MCNC: 14.3 G/DL (ref 12–15.9)
IMM GRANULOCYTES # BLD AUTO: 0.01 10*3/MM3 (ref 0–0.05)
IMM GRANULOCYTES NFR BLD AUTO: 0.2 % (ref 0–0.5)
IRON 24H UR-MRATE: 105 MCG/DL (ref 37–145)
IRON SATN MFR SERPL: 27 % (ref 14–48)
LYMPHOCYTES # BLD AUTO: 1.09 10*3/MM3 (ref 0.7–3.1)
LYMPHOCYTES NFR BLD AUTO: 16.4 % (ref 19.6–45.3)
MCH RBC QN AUTO: 29 PG (ref 26.6–33)
MCHC RBC AUTO-ENTMCNC: 32 G/DL (ref 31.5–35.7)
MCV RBC AUTO: 90.7 FL (ref 79–97)
MONOCYTES # BLD AUTO: 0.52 10*3/MM3 (ref 0.1–0.9)
MONOCYTES NFR BLD AUTO: 7.8 % (ref 5–12)
NEUTROPHILS NFR BLD AUTO: 4.81 10*3/MM3 (ref 1.7–7)
NEUTROPHILS NFR BLD AUTO: 72.5 % (ref 42.7–76)
NRBC BLD AUTO-RTO: 0 /100 WBC (ref 0–0.2)
PLATELET # BLD AUTO: 309 10*3/MM3 (ref 140–450)
PMV BLD AUTO: 8.6 FL (ref 6–12)
RBC # BLD AUTO: 4.93 10*6/MM3 (ref 3.77–5.28)
TIBC SERPL-MCNC: 389 MCG/DL (ref 249–505)
TRANSFERRIN SERPL-MCNC: 278 MG/DL (ref 200–360)
WBC # BLD AUTO: 6.63 10*3/MM3 (ref 3.4–10.8)

## 2021-10-27 PROCEDURE — 84466 ASSAY OF TRANSFERRIN: CPT

## 2021-10-27 PROCEDURE — 36415 COLL VENOUS BLD VENIPUNCTURE: CPT

## 2021-10-27 PROCEDURE — 82728 ASSAY OF FERRITIN: CPT

## 2021-10-27 PROCEDURE — 85025 COMPLETE CBC W/AUTO DIFF WBC: CPT

## 2021-10-27 PROCEDURE — 83540 ASSAY OF IRON: CPT

## 2021-11-03 ENCOUNTER — OFFICE VISIT (OUTPATIENT)
Dept: ONCOLOGY | Facility: CLINIC | Age: 71
End: 2021-11-03

## 2021-11-03 ENCOUNTER — APPOINTMENT (OUTPATIENT)
Dept: LAB | Facility: HOSPITAL | Age: 71
End: 2021-11-03

## 2021-11-03 VITALS
TEMPERATURE: 98 F | SYSTOLIC BLOOD PRESSURE: 139 MMHG | DIASTOLIC BLOOD PRESSURE: 81 MMHG | HEIGHT: 62 IN | WEIGHT: 141 LBS | OXYGEN SATURATION: 97 % | RESPIRATION RATE: 16 BRPM | BODY MASS INDEX: 25.95 KG/M2 | HEART RATE: 65 BPM

## 2021-11-03 DIAGNOSIS — D50.0 IRON DEFICIENCY ANEMIA DUE TO CHRONIC BLOOD LOSS: ICD-10-CM

## 2021-11-03 DIAGNOSIS — C49.A3 GIST (GASTROINTESTINAL STROMAL TUMOR) OF SMALL BOWEL, MALIGNANT (HCC): Primary | ICD-10-CM

## 2021-11-03 PROCEDURE — 99213 OFFICE O/P EST LOW 20 MIN: CPT | Performed by: INTERNAL MEDICINE

## 2021-11-03 NOTE — PROGRESS NOTES
Subjective   REASON FOR FOLLOW UP:   1.  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  2.  Iron deficiency anemia due to gastritis. Corrected with oral iron supplementation    HISTORY OF PRESENT ILLNESS:     History of Present Illness     Mrs. Comer is a 71 y.o. female here today for annual follow-up visit to our office.  She had previously been followed in our office for resected GIST and had completed 5 years of surveillance with a CT scan in January 2021.  We were planning to see her on an as-needed basis after she completed her 5 years of surveillance.  We did not schedule routine appointment but she called and scheduled another appointment due to a new problem.    She was noted to be anemic on labs from June 2021 with a ferritin level of 5.  She started on over-the-counter [place] vitamins with iron and has been taking 1 a day.  She also underwent upper and lower GI endoscopy with her surgeon Dr. Mini Meraz.  The EGD did show gastritis most likely related to her use of nonsteroidal anti-inflammatory drugs.    She has stopped taking Naprosyn and has continued taking omeprazole daily.     She has been taking [place] vitamins with iron and had labs drawn last week on 10/27/2021 which show she is still replete with iron with a hemoglobin of 14.3, ferritin level of 72.5, and an iron saturation of 27%.  She feels fine.  She has had her Covid booster and a flu shot.      Past Oncologic History  She had been seen in consultation during her admission to [facility] from 2/20/2016 to 2/26/2016.  She was admitted at that time with symptomatic anemia and GI bleeding.  On endoscopy the blood appeared to be coming from the region of the appendix and she underwent a laparoscopic appendectomy with Dr. Meraz on 2/22/2016.  Also at the same procedure she was found to have a tumor within the small bowel and had a partial small bowel resection with reanastomosis.  The pathology from that specimen showed a  gastrointestinal stromal tumor (GIST) .  The tumor was 4.5 cm and appeared to be low-grade with 0 mitotic figures per high-powered field.    We discussed with Miss Comer at that time that we would not recommend any postop adjuvant Gleevec therapy as her prognosis was very good with surgery alone.  We did however discuss some follow-up in the office with plans to perform surveillance CT scans for a few years after surgery.    She is here today for annual follow-up and review of surveillance scans.  She is now 5 years out from her surgery and seems to be doing great.  The scans performed on  1/25/2021 do not show any evidence of recurrent or metastatic disease radiographically.  She also had endoscopic surveillance with Dr. Meraz in August 2020 with no major findings.      Past Medical History, Past Surgical History, Social History, Family History have been reviewed and are without significant changes except as mentioned.    Review of Systems   Constitutional: Negative for activity change, appetite change, fatigue, fever and unexpected weight change.   HENT: Negative for hearing loss, nosebleeds, trouble swallowing and voice change.    Eyes: Negative for visual disturbance.   Respiratory: Negative for cough, shortness of breath and wheezing.    Cardiovascular: Negative for chest pain and palpitations.   Gastrointestinal: Negative for abdominal pain, diarrhea, nausea and vomiting.   Genitourinary: Negative for difficulty urinating, frequency, hematuria and urgency.   Musculoskeletal: Negative for back pain and neck pain.   Skin: Negative for rash.   Neurological: Negative for dizziness, seizures, syncope and headaches.   Hematological: Negative for adenopathy. Does not bruise/bleed easily.   Psychiatric/Behavioral: Negative for behavioral problems. The patient is not nervous/anxious.       A comprehensive 14 point review of systems was performed and was negative except as mentioned.    Medications:  The current  "medication list was reviewed in the EMR    ALLERGIES:    Allergies   Allergen Reactions   • Penicillins Rash     Rash over whole body       Objective      Vitals:    11/03/21 1111   BP: 139/81   Pulse: 65   Resp: 16   Temp: 98 °F (36.7 °C)   TempSrc: Temporal   SpO2: 97%   Weight: 64 kg (141 lb)   Height: 157 cm (61.81\")   PainSc: 0-No pain     Current Status 11/3/2021   ECOG score 0       Physical Exam   Constitutional: She is oriented to person, place, and time. She appears well-developed. No distress.   HENT:   Head: Normocephalic.   Eyes: Pupils are equal, round, and reactive to light. Conjunctivae are normal. No scleral icterus.   Neck: No JVD present. No thyromegaly present.   Cardiovascular: Normal rate and regular rhythm. Exam reveals no gallop and no friction rub.   No murmur heard.  Pulmonary/Chest: Effort normal and breath sounds normal. She has no wheezes. She has no rales.   Abdominal: Soft. She exhibits no distension and no mass. There is no abdominal tenderness.   Musculoskeletal: Normal range of motion. No deformity.   Lymphadenopathy:     She has no cervical adenopathy.   Neurological: She is alert and oriented to person, place, and time. She has normal reflexes. No cranial nerve deficit.   Skin: Skin is warm and dry. No rash noted. No erythema.   Psychiatric: Her behavior is normal. Judgment normal.      I have reexamined the patient and the results are consistent with the previously documented exam. Vargas Mejais MD     RECENT LABS:  Hematology WBC   Date Value Ref Range Status   10/27/2021 6.63 3.40 - 10.80 10*3/mm3 Final   06/14/2021 7.63 3.40 - 10.80 10*3/mm3 Final     RBC   Date Value Ref Range Status   10/27/2021 4.93 3.77 - 5.28 10*6/mm3 Final   06/14/2021 4.47 3.77 - 5.28 10*6/mm3 Final     Hemoglobin   Date Value Ref Range Status   10/27/2021 14.3 12.0 - 15.9 g/dL Final     Hematocrit   Date Value Ref Range Status   10/27/2021 44.7 34.0 - 46.6 % Final     Platelets   Date Value Ref " Range Status   10/27/2021 309 140 - 450 10*3/mm3 Final              Lab Results   Component Value Date    IRON 105 10/27/2021    TIBC 389 10/27/2021    FERRITIN 72.50 10/27/2021   SAT 27%    Lab Results   Component Value Date    GLUCOSE 120 08/12/2021    BUN 16 08/12/2021    CREATININE 0.70 08/12/2021    EGFRIFNONA 82 08/12/2021    EGFRIFAFRI 119 06/14/2021    BCR 22.9 08/12/2021    CO2 25.5 08/12/2021    CALCIUM 9.9 08/12/2021    PROTENTOTREF 6.8 06/14/2021    ALBUMIN 4.40 08/12/2021    LABIL2 2.2 06/14/2021    AST 24 08/12/2021    ALT 18 08/12/2021     EGD PATHOLOGY 7/30/2021  Final Diagnosis   1. Antrum, Biopsy: Benign gastric mucosa with                A. Mild chronic inflammation.               B. Features suggestive of chemical gastritis.   C. No intestinal metaplasia.               D. No Helicobacter pylori.      2. Gastroesophageal Junction, Biopsy: Benign squamous and gastric mucosa with                A. Chronic inflammation of the gastric mucosa.               B. No intestinal metaplasia.               C. No Helicobacter pylori.       IMAGING  CT OF THE CHEST ABDOMEN AND PELVIS WITH CONTRAST 1/25/2021   CONCLUSION: Stable CT scan of the chest, no acute intrathoracic  abnormality or suspicious pulmonary lesion has developed.  CONCLUSION: Stable CT scan of the abdomen and pelvis, specifically no  change in the small bowel anastomosis. No indication of local tumor  recurrence nor visceral metastasis.      Assessment/Plan      1.  Gastrointestinal stromal tumor of the small bowel resected 2/22/2016 by Dr. Meraz with good margins.  The tumor appeared to be low-grade with good prognostic features.  Patient continues to do well and CT scans in January 2021 showed no definite evidence of recurrent or metastatic disease at 5 years out from surgery.  2.  Iron deficiency anemia due to GI blood loss.  As noted above, her EGD showed gastritis.  She had been taking nonsteroidals and also had been donating blood.  She  since has stopped taking Naprosyn and we instructed her to stop donating blood at this time also.  She is tolerating oral iron supplementation in the form of Flintstones vitamins. As noted above, her iron studies from last week show that she is still replete with iron.    Plan    1.  We encouraged her to continue to avoid all nonsteroidal anti-inflammatory drugs.  2.  We instructed her to continue the [place] vitamins with iron but to decrease to 1 chewable tablet daily  3. MD follow-up in 6 months with labs drawn 1 week prior to that visit  4.  At this point, she is now over 5 years out from her surgery for small bowel GIST and we will not schedule routine scans unless she develops new symptoms or problems.                    11/3/2021      CC:

## 2021-12-02 DIAGNOSIS — K21.9 GASTROESOPHAGEAL REFLUX DISEASE WITHOUT ESOPHAGITIS: ICD-10-CM

## 2021-12-02 RX ORDER — OMEPRAZOLE 40 MG/1
CAPSULE, DELAYED RELEASE ORAL
Qty: 90 CAPSULE | Refills: 0 | Status: SHIPPED | OUTPATIENT
Start: 2021-12-02 | End: 2022-03-11 | Stop reason: SDUPTHER

## 2021-12-02 NOTE — TELEPHONE ENCOUNTER
Rx Refill Note  Requested Prescriptions     Pending Prescriptions Disp Refills   • omeprazole (priLOSEC) 40 MG capsule [Pharmacy Med Name: OMEPRAZOLE DR 40 MG CAPSULE] 90 capsule 0     Sig: TAKE ONE CAPSULE BY MOUTH DAILY      Last office visit with prescribing clinician: 06/14/2021      Next office visit with prescribing clinician: 12/14/201            Marely Anderson Rep  12/02/21, 13:47 EST

## 2021-12-14 ENCOUNTER — OFFICE VISIT (OUTPATIENT)
Dept: FAMILY MEDICINE CLINIC | Facility: CLINIC | Age: 71
End: 2021-12-14

## 2021-12-14 VITALS
HEART RATE: 78 BPM | OXYGEN SATURATION: 98 % | WEIGHT: 144.1 LBS | TEMPERATURE: 96.8 F | SYSTOLIC BLOOD PRESSURE: 136 MMHG | DIASTOLIC BLOOD PRESSURE: 74 MMHG | HEIGHT: 62 IN | BODY MASS INDEX: 26.52 KG/M2

## 2021-12-14 DIAGNOSIS — M25.561 CHRONIC PAIN OF RIGHT KNEE: ICD-10-CM

## 2021-12-14 DIAGNOSIS — G89.29 CHRONIC PAIN OF RIGHT KNEE: ICD-10-CM

## 2021-12-14 DIAGNOSIS — K21.9 GASTROESOPHAGEAL REFLUX DISEASE WITHOUT ESOPHAGITIS: Primary | ICD-10-CM

## 2021-12-14 PROCEDURE — 99213 OFFICE O/P EST LOW 20 MIN: CPT | Performed by: NURSE PRACTITIONER

## 2021-12-14 NOTE — PROGRESS NOTES
"Chief Complaint  Anemia (6 month f/u anemia )    Subjective          Ashley Comer presents to [facility]  History of Present Illness pt has been doing very well since last visit.  Anemia has resolved and ferritin has greatly increased.  No fatigue.  Followed by hematology.  Currently taking pediatric MVI with iron and peace well.      Taking and peace PPI daily.  Recently pharmacy out and did well on OTC strength. Thinks she could try QOD dosing and see if she remains without sx.  Was told indefinite PPI due to recent gastritis causing GI bleed.     No longer taking naproxen for chronic knee pain.  Has switched to tylenol and not working as well.  Wondering if time to consider right TKA.  Sometimes mildly unstable when going down stairs.  Sometimes awakens her at night with position changes.  More concerned about the noticeable lateral curvature of knee and leg.  Concerned that she may begin to have other issues due to any gait changes.         Objective   Vital Signs:   /74   Pulse 78   Temp 96.8 °F (36 °C)   Ht 157.5 cm (62\")   Wt 65.4 kg (144 lb 1.6 oz)   SpO2 98%   BMI 26.36 kg/m²     Physical Exam  Vitals and nursing note reviewed.   Constitutional:       General: She is not in acute distress.     Appearance: She is well-developed. She is not ill-appearing or diaphoretic.   HENT:      Head: Normocephalic and atraumatic.   Eyes:      General:         Right eye: No discharge.         Left eye: No discharge.      Conjunctiva/sclera: Conjunctivae normal.   Cardiovascular:      Rate and Rhythm: Normal rate and regular rhythm.      Heart sounds: Normal heart sounds.   Pulmonary:      Effort: Pulmonary effort is normal.      Breath sounds: Normal breath sounds.   Abdominal:      General: Bowel sounds are normal.      Palpations: Abdomen is soft.      Tenderness: There is no abdominal tenderness.   Musculoskeletal:         General: No deformity.      Comments: Gait smooth and " steady  Right leg has some mild lateral deviation at knee   Skin:     General: Skin is warm and dry.   Neurological:      General: No focal deficit present.      Mental Status: She is alert and oriented to person, place, and time.   Psychiatric:         Mood and Affect: Mood normal.         Behavior: Behavior normal.        Result Review :{Labs  Result Review  Imaging  Med Tab  Media  Procedures :23}   The following data was reviewed by: CARMELA Murdock on 12/14/2021:  CMP    CMP 1/25/21 1/25/21 6/14/21 8/12/21    0855 0912     Glucose 68 (A)  81 120   BUN 17  13 16   Creatinine 0.59 (A) 0.70 0.60 0.70   eGFR Non  Am 100  99 82   eGFR African Am   119    Sodium 140  140 140   Potassium 4.5  5.2 3.8   Chloride 102  102 102   Calcium 10.2  10.1 9.9   Total Protein   6.8    Albumin 4.50  4.70 4.40   Globulin   2.1    Total Bilirubin 0.3  0.2 0.3   Alkaline Phosphatase 82  70 73   AST (SGOT) 19  14 24   ALT (SGPT) 14  14 18   (A) Abnormal value       Comments are available for some flowsheets but are not being displayed.           CBC w/diff    CBC w/Diff 6/14/21 8/12/21 10/27/21   WBC 7.63 8.80 6.63   RBC 4.47 4.84 4.93   Hemoglobin 11.1 (A) 13.1 14.3   Hematocrit 36.6 41.0 44.7   MCV 81.9 84.7 90.7   MCH 24.8 (A) 27.1 29.0   MCHC 30.3 (A) 32.0 32.0   RDW 14.1 18.1 (A) 13.3   Platelets 383 314 309   Neutrophil Rel % 69.5 74.1 72.5   Immature Granulocyte Rel %  0.2 0.2   Lymphocyte Rel % 20.1 16.3 (A) 16.4 (A)   Monocyte Rel % 8.1 6.9 7.8   Eosinophil Rel % 1.2 1.6 2.3   Basophil Rel % 0.8 0.9 0.8   (A) Abnormal value            Data reviewed: Consultant notes heme-onc notes from last 6 months ferritin         Assessment and Plan    Diagnoses and all orders for this visit:    1. Gastroesophageal reflux disease without esophagitis (Primary)    2. Chronic pain of right knee  -     Ambulatory Referral to Orthopedic Surgery      GERD controlled with PPI-discussed trying QOD to see if sx controlled since  seemed to control at 20mg for short time.  She gets sx at 35 without PPI.  If not peace QOD dosing can see if we can decrease to 20mg and still contol sx.  Has stopped naproxen likely causing much of her sx.  Risks and benefits long term use PPI discussed.      I think it would be a good idea to see ortho for right knee eval.  Kneeling is important to her and wants to make sure she will be able to kneel at [facility] post TKA if needed.  Never had knee injection secondary to covid.  Fall precautions discussed.     She will be getting labs done per heme-onc close to 6 months-can review these-no need to do additional labs today.     Covid safety discussed.       Follow Up   Return in about 6 months (around 6/14/2022) for Medicare Wellness.  Patient was given instructions and counseling regarding her condition or for health maintenance advice. Please see specific information pulled into the AVS if appropriate.

## 2022-01-04 ENCOUNTER — APPOINTMENT (OUTPATIENT)
Dept: WOMENS IMAGING | Facility: HOSPITAL | Age: 72
End: 2022-01-04

## 2022-01-04 PROCEDURE — 77067 SCR MAMMO BI INCL CAD: CPT | Performed by: RADIOLOGY

## 2022-01-04 PROCEDURE — 77063 BREAST TOMOSYNTHESIS BI: CPT | Performed by: RADIOLOGY

## 2022-01-06 ENCOUNTER — OFFICE VISIT (OUTPATIENT)
Dept: ORTHOPEDIC SURGERY | Facility: CLINIC | Age: 72
End: 2022-01-06

## 2022-01-06 VITALS — BODY MASS INDEX: 26.31 KG/M2 | HEIGHT: 62 IN | TEMPERATURE: 97.8 F | WEIGHT: 143 LBS

## 2022-01-06 DIAGNOSIS — M25.561 RIGHT KNEE PAIN, UNSPECIFIED CHRONICITY: Primary | ICD-10-CM

## 2022-01-06 PROCEDURE — 73562 X-RAY EXAM OF KNEE 3: CPT | Performed by: ORTHOPAEDIC SURGERY

## 2022-01-06 PROCEDURE — 20610 DRAIN/INJ JOINT/BURSA W/O US: CPT | Performed by: ORTHOPAEDIC SURGERY

## 2022-01-06 PROCEDURE — 99203 OFFICE O/P NEW LOW 30 MIN: CPT | Performed by: ORTHOPAEDIC SURGERY

## 2022-01-06 RX ORDER — METHYLPREDNISOLONE ACETATE 80 MG/ML
80 INJECTION, SUSPENSION INTRA-ARTICULAR; INTRALESIONAL; INTRAMUSCULAR; SOFT TISSUE
Status: COMPLETED | OUTPATIENT
Start: 2022-01-06 | End: 2022-01-06

## 2022-01-06 RX ADMIN — METHYLPREDNISOLONE ACETATE 80 MG: 80 INJECTION, SUSPENSION INTRA-ARTICULAR; INTRALESIONAL; INTRAMUSCULAR; SOFT TISSUE at 17:02

## 2022-01-06 NOTE — PROGRESS NOTES
willowPatient: Ashley Comer  YOB: 1950 71 y.o. female  Medical Record Number: [id number]    Chief Complaints:   Chief Complaint   Patient presents with   • Right Knee - new patient       History of Present Illness:Ashley Comer is a 71 y.o. female who presents with right medial knee pain which at times is severe is beginning to limit activities.  She describes aching pain worse with weightbearing which has progressed over the last few months.    Allergies:   Allergies   Allergen Reactions   • Penicillins Rash     Rash over whole body       Medications:   Current Outpatient Medications   Medication Sig Dispense Refill   • acetaminophen (TYLENOL) 500 MG tablet Take 1,000 mg by mouth Every 6 (Six) Hours As Needed for Mild Pain .     • B Complex-C (SUPER B COMPLEX PO) Take 1 tablet by mouth daily. As directed     • calcium citrate-vitamin d (CITRACAL) 200-250 MG-UNIT tablet tablet Take 1 tablet by mouth 2 (Two) Times a Day.     • cetirizine (ZyrTEC Allergy) 10 MG tablet Take 5 mg by mouth Daily.     • Cholecalciferol (VITAMIN D3) 1000 UNITS capsule Take 1,000 Units by mouth Daily. Take as directed      • Multiple Vitamins-Minerals (PRESERVISION AREDS 2 PO) Take  by mouth Daily.     • omeprazole (priLOSEC) 40 MG capsule TAKE ONE CAPSULE BY MOUTH DAILY 90 capsule 0   • Pediatric Multiple Vit-C-FA (Flinstones Gummies Omega-3 DHA) chewable tablet      • RESTASIS 0.05 % ophthalmic emulsion      • ascorbic acid (VITAMIN C) 1000 MG tablet      • Dietary Management Product (Fosteum Plus) capsule        No current facility-administered medications for this visit.         The following portions of the patient's history were reviewed and updated as appropriate: allergies, current medications, past family history, past medical history, past social history, past surgical history and problem list.    Review of Systems:   A 14 point review of systems was performed. All systems negative except pertinent positives/negative  "listed in HPI above    Physical Exam:   Vitals:    01/06/22 0851   Temp: 97.8 °F (36.6 °C)   Weight: 64.9 kg (143 lb)   Height: 157.5 cm (62\")       General: A and O x 3, ASA, NAD    SCLERA:    Normal    DENTITION:   Normal  Knee:  right    ALIGNMENT:     Valgus      GAIT:    Antalgic    SKIN:    No abnormality    RANGE OF MOTION:   4  -  120   DEG    STRENGTH:   4  / 5    LIGAMENTS:    No varus / valgus instability.   Negative  Lachman.    MENISCUS:     Negative   Juan Ramon       DISTAL PULSES:    Paplable    DISTAL SENSATION :   Intact    LYMPHATICS:     No   lymphadenopathy    OTHER:          - Positive trace effusion      - Crepitance with ROM        Radiology:  Xrays 3views right knee (ap,lateral, sunrise) were ordered and reviewed for evaluation of knee pain demonstrating advanced valgus osteoarthritis with bone on bone articulation, subchondral cysts, and periarticular osteophytes, there is erosion of the patella and lateral subluation.     Assessment/Plan: Right > Left knee endstag OA.  Right knee injected as below we will send for therapy exercises.  Heel wedge given offload needle joint.  She will likely going require knee replacement in the future when these conservative measures are no longer effective  Large Joint Arthrocentesis: R knee  Date/Time: 1/6/2022 5:02 PM  Consent given by: patient  Site marked: site marked  Timeout: Immediately prior to procedure a time out was called to verify the correct patient, procedure, equipment, support staff and site/side marked as required   Supporting Documentation  Indications: pain and joint swelling   Procedure Details  Location: knee - R knee  Preparation: Patient was prepped and draped in the usual sterile fashion  Needle size: 22 G  Approach: anterolateral  Medications administered: 80 mg methylPREDNISolone acetate 80 MG/ML; 2 mL lidocaine (cardiac)  Patient tolerance: patient tolerated the procedure well with no immediate complications              Daniele NAM" MD Navi  1/6/2022

## 2022-03-11 DIAGNOSIS — K21.9 GASTROESOPHAGEAL REFLUX DISEASE WITHOUT ESOPHAGITIS: ICD-10-CM

## 2022-03-11 RX ORDER — OMEPRAZOLE 40 MG/1
40 CAPSULE, DELAYED RELEASE ORAL DAILY
Qty: 90 CAPSULE | Refills: 1 | Status: SHIPPED | OUTPATIENT
Start: 2022-03-11 | End: 2022-09-08

## 2022-03-11 NOTE — TELEPHONE ENCOUNTER
Rx Refill Note  Requested Prescriptions     Pending Prescriptions Disp Refills   • omeprazole (priLOSEC) 40 MG capsule 90 capsule 0     Sig: Take 1 capsule by mouth Daily.      Last office visit with prescribing clinician: 12/14/2021      Next office visit with prescribing clinician: 6/16/2022            Analilia Haas MA  03/11/22, 11:09 EST

## 2022-04-06 ENCOUNTER — LAB (OUTPATIENT)
Dept: LAB | Facility: HOSPITAL | Age: 72
End: 2022-04-06

## 2022-04-06 DIAGNOSIS — C49.A3 GIST (GASTROINTESTINAL STROMAL TUMOR) OF SMALL BOWEL, MALIGNANT: ICD-10-CM

## 2022-04-06 DIAGNOSIS — D50.0 IRON DEFICIENCY ANEMIA DUE TO CHRONIC BLOOD LOSS: ICD-10-CM

## 2022-04-06 LAB
ALBUMIN SERPL-MCNC: 4.8 G/DL (ref 3.5–5.2)
ALBUMIN/GLOB SERPL: 1.8 G/DL (ref 1.1–2.4)
ALP SERPL-CCNC: 61 U/L (ref 38–116)
ALT SERPL W P-5'-P-CCNC: 25 U/L (ref 0–33)
ANION GAP SERPL CALCULATED.3IONS-SCNC: 11.1 MMOL/L (ref 5–15)
AST SERPL-CCNC: 25 U/L (ref 0–32)
BASOPHILS # BLD AUTO: 0.06 10*3/MM3 (ref 0–0.2)
BASOPHILS NFR BLD AUTO: 0.8 % (ref 0–1.5)
BILIRUB SERPL-MCNC: 0.4 MG/DL (ref 0.2–1.2)
BUN SERPL-MCNC: 16 MG/DL (ref 6–20)
BUN/CREAT SERPL: 24.2 (ref 7.3–30)
CALCIUM SPEC-SCNC: 10.2 MG/DL (ref 8.5–10.2)
CHLORIDE SERPL-SCNC: 103 MMOL/L (ref 98–107)
CO2 SERPL-SCNC: 25.9 MMOL/L (ref 22–29)
CREAT SERPL-MCNC: 0.66 MG/DL (ref 0.6–1.1)
DEPRECATED RDW RBC AUTO: 42.3 FL (ref 37–54)
EGFRCR SERPLBLD CKD-EPI 2021: 93.3 ML/MIN/1.73
EOSINOPHIL # BLD AUTO: 0.11 10*3/MM3 (ref 0–0.4)
EOSINOPHIL NFR BLD AUTO: 1.5 % (ref 0.3–6.2)
ERYTHROCYTE [DISTWIDTH] IN BLOOD BY AUTOMATED COUNT: 12.1 % (ref 12.3–15.4)
FERRITIN SERPL-MCNC: 98.1 NG/ML (ref 13–150)
GLOBULIN UR ELPH-MCNC: 2.7 GM/DL (ref 1.8–3.5)
GLUCOSE SERPL-MCNC: 90 MG/DL (ref 74–124)
HCT VFR BLD AUTO: 44.9 % (ref 34–46.6)
HGB BLD-MCNC: 14.3 G/DL (ref 12–15.9)
IMM GRANULOCYTES # BLD AUTO: 0.01 10*3/MM3 (ref 0–0.05)
IMM GRANULOCYTES NFR BLD AUTO: 0.1 % (ref 0–0.5)
IRON 24H UR-MRATE: 116 MCG/DL (ref 37–145)
IRON SATN MFR SERPL: 30 % (ref 14–48)
LYMPHOCYTES # BLD AUTO: 1.49 10*3/MM3 (ref 0.7–3.1)
LYMPHOCYTES NFR BLD AUTO: 20.9 % (ref 19.6–45.3)
MCH RBC QN AUTO: 29.9 PG (ref 26.6–33)
MCHC RBC AUTO-ENTMCNC: 31.8 G/DL (ref 31.5–35.7)
MCV RBC AUTO: 93.7 FL (ref 79–97)
MONOCYTES # BLD AUTO: 0.6 10*3/MM3 (ref 0.1–0.9)
MONOCYTES NFR BLD AUTO: 8.4 % (ref 5–12)
NEUTROPHILS NFR BLD AUTO: 4.87 10*3/MM3 (ref 1.7–7)
NEUTROPHILS NFR BLD AUTO: 68.3 % (ref 42.7–76)
NRBC BLD AUTO-RTO: 0 /100 WBC (ref 0–0.2)
PLATELET # BLD AUTO: 331 10*3/MM3 (ref 140–450)
PMV BLD AUTO: 8.5 FL (ref 6–12)
POTASSIUM SERPL-SCNC: 5.1 MMOL/L (ref 3.5–4.7)
PROT SERPL-MCNC: 7.5 G/DL (ref 6.3–8)
RBC # BLD AUTO: 4.79 10*6/MM3 (ref 3.77–5.28)
SODIUM SERPL-SCNC: 140 MMOL/L (ref 134–145)
TIBC SERPL-MCNC: 388 MCG/DL (ref 249–505)
TRANSFERRIN SERPL-MCNC: 277 MG/DL (ref 200–360)
WBC NRBC COR # BLD: 7.14 10*3/MM3 (ref 3.4–10.8)

## 2022-04-06 PROCEDURE — 80053 COMPREHEN METABOLIC PANEL: CPT

## 2022-04-06 PROCEDURE — 84466 ASSAY OF TRANSFERRIN: CPT

## 2022-04-06 PROCEDURE — 82728 ASSAY OF FERRITIN: CPT

## 2022-04-06 PROCEDURE — 36415 COLL VENOUS BLD VENIPUNCTURE: CPT

## 2022-04-06 PROCEDURE — 83540 ASSAY OF IRON: CPT

## 2022-04-06 PROCEDURE — 85025 COMPLETE CBC W/AUTO DIFF WBC: CPT

## 2022-04-14 ENCOUNTER — OFFICE VISIT (OUTPATIENT)
Dept: ORTHOPEDIC SURGERY | Facility: CLINIC | Age: 72
End: 2022-04-14

## 2022-04-14 VITALS — BODY MASS INDEX: 26.31 KG/M2 | TEMPERATURE: 97.3 F | WEIGHT: 143 LBS | HEIGHT: 62 IN

## 2022-04-14 DIAGNOSIS — M17.0 PRIMARY OSTEOARTHRITIS OF KNEES, BILATERAL: Primary | ICD-10-CM

## 2022-04-14 PROCEDURE — 20610 DRAIN/INJ JOINT/BURSA W/O US: CPT | Performed by: NURSE PRACTITIONER

## 2022-04-14 RX ORDER — METHYLPREDNISOLONE ACETATE 80 MG/ML
80 INJECTION, SUSPENSION INTRA-ARTICULAR; INTRALESIONAL; INTRAMUSCULAR; SOFT TISSUE
Status: COMPLETED | OUTPATIENT
Start: 2022-04-14 | End: 2022-04-14

## 2022-04-14 RX ORDER — IBANDRONATE SODIUM 150 MG/1
TABLET, FILM COATED ORAL
COMMUNITY
Start: 2022-01-24 | End: 2022-04-28

## 2022-04-14 RX ORDER — IBANDRONATE SODIUM 150 MG/1
150 TABLET, FILM COATED ORAL
COMMUNITY
Start: 2022-04-12

## 2022-04-14 RX ADMIN — METHYLPREDNISOLONE ACETATE 80 MG: 80 INJECTION, SUSPENSION INTRA-ARTICULAR; INTRALESIONAL; INTRAMUSCULAR; SOFT TISSUE at 18:45

## 2022-04-14 NOTE — PROGRESS NOTES
4/14/2022    Ashley Comer is here today for worsening knee pain. Pt has undergone injection of the knee in the past with good resolution of symptoms. Pt is requesting a repeat injection.     KNEE Injection Procedure Note:    Large Joint Arthrocentesis: R knee  Date/Time: 4/14/2022 6:45 PM  Consent given by: patient  Site marked: site marked  Timeout: Immediately prior to procedure a time out was called to verify the correct patient, procedure, equipment, support staff and site/side marked as required   Supporting Documentation  Indications: pain and joint swelling   Procedure Details  Location: knee - R knee  Preparation: Patient was prepped and draped in the usual sterile fashion  Needle size: 22 G  Approach: anterolateral  Medications administered: 80 mg methylPREDNISolone acetate 80 MG/ML; 2 mL lidocaine (cardiac)  Patient tolerance: patient tolerated the procedure well with no immediate complications    Large Joint Arthrocentesis: L knee  Date/Time: 4/14/2022 6:45 PM  Consent given by: patient  Site marked: site marked  Timeout: Immediately prior to procedure a time out was called to verify the correct patient, procedure, equipment, support staff and site/side marked as required   Supporting Documentation  Indications: pain and joint swelling   Procedure Details  Location: knee - L knee  Preparation: Patient was prepped and draped in the usual sterile fashion  Needle size: 22 G  Approach: anterolateral  Medications administered: 80 mg methylPREDNISolone acetate 80 MG/ML; 2 mL lidocaine (cardiac)  Patient tolerance: patient tolerated the procedure well with no immediate complications          At the conclusion of the injection I discussed the importance of continued quad strengthening exercises on a daily basis. I will see the patient back if the symptoms should fail to improve or worsen.    Xavi Delgado, APRN  4/14/2022

## 2022-04-28 ENCOUNTER — APPOINTMENT (OUTPATIENT)
Dept: LAB | Facility: HOSPITAL | Age: 72
End: 2022-04-28

## 2022-04-28 ENCOUNTER — OFFICE VISIT (OUTPATIENT)
Dept: ONCOLOGY | Facility: CLINIC | Age: 72
End: 2022-04-28

## 2022-04-28 VITALS
SYSTOLIC BLOOD PRESSURE: 160 MMHG | BODY MASS INDEX: 27.27 KG/M2 | HEART RATE: 55 BPM | OXYGEN SATURATION: 95 % | WEIGHT: 148.2 LBS | HEIGHT: 62 IN | DIASTOLIC BLOOD PRESSURE: 81 MMHG | TEMPERATURE: 97.1 F | RESPIRATION RATE: 18 BRPM

## 2022-04-28 DIAGNOSIS — D50.0 IRON DEFICIENCY ANEMIA DUE TO CHRONIC BLOOD LOSS: ICD-10-CM

## 2022-04-28 DIAGNOSIS — C49.A3 GIST (GASTROINTESTINAL STROMAL TUMOR) OF SMALL BOWEL, MALIGNANT: Primary | ICD-10-CM

## 2022-04-28 PROCEDURE — 99213 OFFICE O/P EST LOW 20 MIN: CPT | Performed by: INTERNAL MEDICINE

## 2022-04-28 NOTE — PROGRESS NOTES
Subjective   REASON FOR FOLLOW UP:   1.  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  2.  Iron deficiency anemia due to gastritis. Corrected with oral iron supplementation    HISTORY OF PRESENT ILLNESS:     History of Present Illness     Mrs. Comer is a 71 y.o. female here today for annual follow-up visit to our office.  She had previously been followed in our office for resected GIST and had completed 5 years of surveillance with a CT scan in January 2021.  We were planning to see her on an as-needed basis after she completed her 5 years of surveillance.  We did not schedule routine appointment but she called and scheduled another appointment due to a new problem.    She was noted to be anemic on labs from June 2021 with a ferritin level of 5.  She started on over-the-counter Brush vitamins with iron and has been taking 1 a day.  She also underwent upper and lower GI endoscopy with her surgeon Dr. Mini Meraz.  The EGD did show gastritis most likely related to her use of nonsteroidal anti-inflammatory drugs.    She has stopped taking Naprosyn and has continued taking omeprazole daily.     She has been taking Brush vitamins with iron.  She had her labs performed on 4/6/2022 which showed that she is still in good shape in regards to her iron stores.  Her iron saturation was 30% with a serum ferritin of 98.  Her hemoglobin was normal at 14.3 g/dL.      Past Oncologic History  She had been seen in consultation during her admission to [facility] from 2/20/2016 to 2/26/2016.  She was admitted at that time with symptomatic anemia and GI bleeding.  On endoscopy the blood appeared to be coming from the region of the appendix and she underwent a laparoscopic appendectomy with Dr. Meraz on 2/22/2016.  Also at the same procedure she was found to have a tumor within the small bowel and had a partial small bowel resection with reanastomosis.  The pathology from that specimen showed a gastrointestinal  stromal tumor (GIST) .  The tumor was 4.5 cm and appeared to be low-grade with 0 mitotic figures per high-powered field.    We discussed with Miss Comer at that time that we would not recommend any postop adjuvant Gleevec therapy as her prognosis was very good with surgery alone.  We did however discuss some follow-up in the office with plans to perform surveillance CT scans for a few years after surgery.    She is here today for annual follow-up and review of surveillance scans.  She is now over 6 years out from her surgery and seems to be doing great.  The scans performed on  1/25/2021 do not show any evidence of recurrent or metastatic disease radiographically.  She also had endoscopic surveillance with Dr. Meraz in August 2020 with no major findings.      Past Medical History, Past Surgical History, Social History, Family History have been reviewed and are without significant changes except as mentioned.    Review of Systems   Constitutional: Negative for activity change, appetite change, fatigue, fever and unexpected weight change.   HENT: Negative for hearing loss, nosebleeds, trouble swallowing and voice change.    Eyes: Negative for visual disturbance.   Respiratory: Negative for cough, shortness of breath and wheezing.    Cardiovascular: Negative for chest pain and palpitations.   Gastrointestinal: Negative for abdominal pain, diarrhea, nausea and vomiting.   Genitourinary: Negative for difficulty urinating, frequency, hematuria and urgency.   Musculoskeletal: Negative for back pain and neck pain.   Skin: Negative for rash.   Neurological: Negative for dizziness, seizures, syncope and headaches.   Hematological: Negative for adenopathy. Does not bruise/bleed easily.   Psychiatric/Behavioral: Negative for behavioral problems. The patient is not nervous/anxious.       A comprehensive 14 point review of systems was performed and was negative except as mentioned.    Medications:  The current medication list  "was reviewed in the EMR    ALLERGIES:    Allergies   Allergen Reactions   • Penicillins Rash     Rash over whole body       Objective      Vitals:    04/28/22 1501   BP: 160/81   Pulse: 55   Resp: 18   Temp: 97.1 °F (36.2 °C)   TempSrc: Temporal   SpO2: 95%   Weight: 67.2 kg (148 lb 3.2 oz)   Height: 157.5 cm (62.01\")   PainSc: 0-No pain     Current Status 4/28/2022   ECOG score 0       Physical Exam   Constitutional: She is oriented to person, place, and time. She appears well-developed. No distress.   HENT:   Head: Normocephalic.   Eyes: Pupils are equal, round, and reactive to light. Conjunctivae are normal. No scleral icterus.   Neck: No JVD present. No thyromegaly present.   Cardiovascular: Normal rate and regular rhythm. Exam reveals no gallop and no friction rub.   No murmur heard.  Pulmonary/Chest: Effort normal and breath sounds normal. She has no wheezes. She has no rales.   Abdominal: Soft. She exhibits no distension and no mass. There is no abdominal tenderness.   Musculoskeletal: Normal range of motion. No deformity.   Lymphadenopathy:     She has no cervical adenopathy.   Neurological: She is alert and oriented to person, place, and time. She has normal reflexes. No cranial nerve deficit.   Skin: Skin is warm and dry. No rash noted. No erythema.   Psychiatric: Her behavior is normal. Judgment normal.      I have reexamined the patient and the results are consistent with the previously documented exam. Vargas Mejias MD     RECENT LABS:  Hematology WBC   Date Value Ref Range Status   04/06/2022 7.14 3.40 - 10.80 10*3/mm3 Final   06/14/2021 7.63 3.40 - 10.80 10*3/mm3 Final     RBC   Date Value Ref Range Status   04/06/2022 4.79 3.77 - 5.28 10*6/mm3 Final   06/14/2021 4.47 3.77 - 5.28 10*6/mm3 Final     Hemoglobin   Date Value Ref Range Status   04/06/2022 14.3 12.0 - 15.9 g/dL Final     Hematocrit   Date Value Ref Range Status   04/06/2022 44.9 34.0 - 46.6 % Final     Platelets   Date Value Ref Range " Status   04/06/2022 331 140 - 450 10*3/mm3 Final              Lab Results   Component Value Date    IRON 116 04/06/2022    TIBC 388 04/06/2022    FERRITIN 98.10 04/06/2022   SAT 30%    Lab Results   Component Value Date    GLUCOSE 90 04/06/2022    BUN 16 04/06/2022    CREATININE 0.66 04/06/2022    EGFRIFNONA 82 08/12/2021    EGFRIFAFRI 119 06/14/2021    BCR 24.2 04/06/2022    CO2 25.9 04/06/2022    CALCIUM 10.2 04/06/2022    PROTENTOTREF 6.8 06/14/2021    ALBUMIN 4.80 04/06/2022    LABIL2 2.2 06/14/2021    AST 25 04/06/2022    ALT 25 04/06/2022     EGD PATHOLOGY 7/30/2021  Final Diagnosis   1. Antrum, Biopsy: Benign gastric mucosa with                A. Mild chronic inflammation.               B. Features suggestive of chemical gastritis.   C. No intestinal metaplasia.               D. No Helicobacter pylori.      2. Gastroesophageal Junction, Biopsy: Benign squamous and gastric mucosa with                A. Chronic inflammation of the gastric mucosa.               B. No intestinal metaplasia.               C. No Helicobacter pylori.       IMAGING:  CT OF THE CHEST ABDOMEN AND PELVIS WITH CONTRAST 1/25/2021   CONCLUSION: Stable CT scan of the chest, no acute intrathoracic  abnormality or suspicious pulmonary lesion has developed.  CONCLUSION: Stable CT scan of the abdomen and pelvis, specifically no  change in the small bowel anastomosis. No indication of local tumor  recurrence nor visceral metastasis.      Assessment/Plan      1.  Gastrointestinal stromal tumor of the small bowel resected 2/22/2016 by Dr. Meraz with good margins.  The tumor appeared to be low-grade with good prognostic features.  Patient continues to do well and CT scans in January 2021 showed no definite evidence of recurrent or metastatic disease at 5 years out from surgery.  2.  Iron deficiency anemia due to GI blood loss.  As noted above, her EGD showed gastritis.  She had been taking nonsteroidals and also had been donating blood.  She since  has stopped taking Naprosyn and we instructed her to stop donating blood at this time also.  She is tolerating oral iron supplementation in the form of [place] vitamins. As noted above, her iron studies from last week show that she is still replete with iron.    Plan    1.   We reviewed the results of the labs from 4/6/2022 with the patient and explained that she is still replete with iron.  2.   We encouraged her to continue to avoid all nonsteroidal anti-inflammatory drugs.  3.  We instructed her to continue the [place] vitamins with iron 1  tablet daily  3. MD follow-up in 6 months with labs drawn 1 week prior to that visit  4.  At this point, she is now over 5 years out from her surgery for small bowel GIST and we will not schedule routine scans unless she develops new symptoms or problems.                    4/28/2022      CC:

## 2022-05-09 ENCOUNTER — TELEPHONE (OUTPATIENT)
Dept: ONCOLOGY | Facility: CLINIC | Age: 72
End: 2022-05-09

## 2022-05-09 NOTE — TELEPHONE ENCOUNTER
Caller: HARPREET    Relationship to patient: SELF    Best call back number: [phone]    Patient is needing: TO R/S 10-6-2022 LAB APPT AND 10- VITALS AND F/U APPT TO November DUE TO KNEE REPLACEMENT SURGERY.

## 2022-06-16 ENCOUNTER — OFFICE VISIT (OUTPATIENT)
Dept: FAMILY MEDICINE CLINIC | Facility: CLINIC | Age: 72
End: 2022-06-16

## 2022-06-16 VITALS
HEIGHT: 62 IN | HEART RATE: 76 BPM | OXYGEN SATURATION: 96 % | DIASTOLIC BLOOD PRESSURE: 80 MMHG | SYSTOLIC BLOOD PRESSURE: 129 MMHG | WEIGHT: 145.8 LBS | TEMPERATURE: 95.1 F | BODY MASS INDEX: 26.83 KG/M2

## 2022-06-16 DIAGNOSIS — E87.5 HYPERKALEMIA: ICD-10-CM

## 2022-06-16 DIAGNOSIS — E04.2 MULTIPLE THYROID NODULES: ICD-10-CM

## 2022-06-16 DIAGNOSIS — Z00.00 ENCOUNTER FOR ANNUAL WELLNESS VISIT (AWV) IN MEDICARE PATIENT: Primary | ICD-10-CM

## 2022-06-16 DIAGNOSIS — E78.2 MIXED HYPERLIPIDEMIA: ICD-10-CM

## 2022-06-16 PROCEDURE — 1160F RVW MEDS BY RX/DR IN RCRD: CPT | Performed by: NURSE PRACTITIONER

## 2022-06-16 PROCEDURE — 1170F FXNL STATUS ASSESSED: CPT | Performed by: NURSE PRACTITIONER

## 2022-06-16 PROCEDURE — G0439 PPPS, SUBSEQ VISIT: HCPCS | Performed by: NURSE PRACTITIONER

## 2022-06-16 NOTE — PROGRESS NOTES
The ABCs of the Annual Wellness Visit  Subsequent Medicare Wellness Visit    Chief Complaint   Patient presents with   • Annual Exam     AWV       Subjective    History of Present Illness:  Ashley Comer is a 71 y.o. female who presents for a Subsequent Medicare Wellness Visit.    Probable upcoming knee replacement that she is not looking forward to but is working toward making sure her recovery goes well.    She is taking and tolerating Boniva without side effects.      The following portions of the patient's history were reviewed and   updated as appropriate: allergies, current medications, past family history, past medical history, past social history, past surgical history and problem list.    Compared to one year ago, the patient feels her physical   health is the same.    Compared to one year ago, the patient feels her mental   health is the same.    Recent Hospitalizations:  She was not admitted to the hospital during the last year.       Current Medical Providers:  Patient Care Team:  Fannie Aguirre APRN as PCP - General (Nurse Practitioner)  Vargas Mejias MD as Consulting Physician (Hematology and Oncology)  Mini Meraz MD as Referring Physician (General Surgery)  Sacha Porter MD as Consulting Physician (Obstetrics and Gynecology)    Outpatient Medications Prior to Visit   Medication Sig Dispense Refill   • acetaminophen (TYLENOL) 500 MG tablet Take 1,000 mg by mouth Every 6 (Six) Hours As Needed for Mild Pain .     • B Complex-C (SUPER B COMPLEX PO) Take 1 tablet by mouth daily. As directed     • calcium citrate-vitamin d (CITRACAL) 200-250 MG-UNIT tablet tablet Take 1 tablet by mouth 2 (Two) Times a Day.     • cetirizine (zyrTEC) 10 MG tablet Take 5 mg by mouth Daily.     • Cholecalciferol (VITAMIN D3) 1000 UNITS capsule Take 1,000 Units by mouth Daily. Take as directed      • ibandronate (BONIVA) 150 MG tablet      • Multiple Vitamins-Minerals (PRESERVISION AREDS 2 PO) Take  by  "mouth Daily.     • omeprazole (priLOSEC) 40 MG capsule Take 1 capsule by mouth Daily. 90 capsule 1   • RESTASIS 0.05 % ophthalmic emulsion        No facility-administered medications prior to visit.       No opioid medication identified on active medication list. I have reviewed chart for other potential  high risk medication/s and harmful drug interactions in the elderly.          Aspirin is not on active medication list.  Aspirin use is not indicated based on review of current medical condition/s. Risk of harm outweighs potential benefits.  .    Patient Active Problem List   Diagnosis   • Iron deficiency anemia   • GIST (gastrointestinal stromal tumor) of small bowel, malignant (HCC)   • Hoarseness of voice   • Mixed hyperlipidemia   • Rectal polyp   • Neck pain   • Multiple thyroid nodules   • Spinal stenosis in cervical region   • Cervical radiculopathy due to intervertebral disc disorder   • GERD (gastroesophageal reflux disease)   • Occult blood in stools     Advance Care Planning  Advance Directive is not on file.  ACP discussion was held with the patient during this visit. Patient has an advance directive (not in EMR), copy requested.          Objective    Vitals:    06/16/22 1019   BP: 129/80   Pulse: 76   Temp: 95.1 °F (35.1 °C)   SpO2: 96%   Weight: 66.1 kg (145 lb 12.8 oz)   Height: 157.5 cm (62\")   PainSc: 0-No pain     Estimated body mass index is 26.67 kg/m² as calculated from the following:    Height as of this encounter: 157.5 cm (62\").    Weight as of this encounter: 66.1 kg (145 lb 12.8 oz).    BMI is >= 25 and <30. (Overweight) The following options were offered after discussion;: exercise counseling/recommendations and nutrition counseling/recommendations      Does the patient have evidence of cognitive impairment? No    Physical Exam            HEALTH RISK ASSESSMENT    Smoking Status:  Social History     Tobacco Use   Smoking Status Former Smoker   • Packs/day: 1.00   • Years: 25.00   • Pack " years: 25.00   • Types: Cigarettes, Cigarettes   • Start date: 1968   • Quit date: 1992   • Years since quittin.4   Smokeless Tobacco Never Used     Alcohol Consumption:  Social History     Substance and Sexual Activity   Alcohol Use Yes    Comment: occas     Fall Risk Screen:    SUSI Fall Risk Assessment was completed, and patient is at LOW risk for falls.Assessment completed on:2022    Depression Screening:  PHQ-2/PHQ-9 Depression Screening 2022   Retired PHQ-9 Total Score -   Retired Total Score -   Little Interest or Pleasure in Doing Things 0-->not at all   Feeling Down, Depressed or Hopeless 0-->not at all   PHQ-9: Brief Depression Severity Measure Score 0       Health Habits and Functional and Cognitive Screening:  Functional & Cognitive Status 2022   Do you have difficulty preparing food and eating? No   Do you have difficulty bathing yourself, getting dressed or grooming yourself? No   Do you have difficulty using the toilet? No   Do you have difficulty moving around from place to place? No   Do you have trouble with steps or getting out of a bed or a chair? No   Current Diet Well Balanced Diet   Dental Exam Up to date        Dental Exam Comment -   Eye Exam Up to date        Eye Exam Comment -   Exercise (times per week) 7 times per week   Current Exercises Include House Cleaning;Yard Work   Current Exercise Activities Include -   Do you need help using the phone?  No   Are you deaf or do you have serious difficulty hearing?  No   Do you need help with transportation? No   Do you need help shopping? No   Do you need help preparing meals?  No   Do you need help with housework?  No   Do you need help with laundry? No   Do you need help taking your medications? No   Do you need help managing money? No   Do you ever drive or ride in a car without wearing a seat belt? No   Have you felt unusual stress, anger or loneliness in the last month? No   Who do you live with? Alone   If you  need help, do you have trouble finding someone available to you? No   Have you been bothered in the last four weeks by sexual problems? -   Do you have difficulty concentrating, remembering or making decisions? No       Age-appropriate Screening Schedule:  Refer to the list below for future screening recommendations based on patient's age, sex and/or medical conditions. Orders for these recommended tests are listed in the plan section. The patient has been provided with a written plan.    Health Maintenance   Topic Date Due   • LIPID PANEL  06/14/2022   • TDAP/TD VACCINES (2 - Td or Tdap) 06/16/2023 (Originally 2/22/2022)   • INFLUENZA VACCINE  10/01/2022   • MAMMOGRAM  01/04/2023   • DXA SCAN  01/04/2024   • ZOSTER VACCINE  Discontinued              Assessment & Plan   CMS Preventative Services Quick Reference  Risk Factors Identified During Encounter  Immunizations Discussed/Encouraged (specific Immunizations; Td and COVID19  The above risks/problems have been discussed with the patient.  Follow up actions/plans if indicated are seen below in the Assessment/Plan Section.  Pertinent information has been shared with the patient in the After Visit Summary.    Diagnoses and all orders for this visit:    1. Encounter for annual wellness visit (AWV) in Medicare patient (Primary)    2. Hyperkalemia  -     Basic Metabolic Panel; Future    3. Mixed hyperlipidemia  -     Lipid Panel With LDL / HDL Ratio; Future    4. Multiple thyroid nodules  -     TSH Rfx On Abnormal To Free T4; Future     As part of wellness and prevention, the following topics were discussed with the patient:   Nutrition-diet high in fresh/fozen veges, lower in carbs, unsaturated fats, and higher in lean proteins, adequate hydration   Physical activity/regular exercise-150 mins per week of moderate activity that increases HR and is enjoyable to lower stress and improve CVD     Healthy weight-goal BMI discussed-feels approximately 5 pounds above her ideal  weight.    Injury prevention-covid safety, back and joint health    Substance misuse/abuse-none   Dental health-recommend twice per year dental cleans and daily flossing to lower inflammation in body   Mental health-stress mgmt and sleep hygiene   Immunization-recommended  vaccines discussed  UTD on CRC screen  Up-to-date on mammos and followed by GYN.  She is up-to-date on DEXA scan.    Will get TSH, lipids, BMP.  She had recent hyperkalemia noted.  Most likely lab error.  Lipids have been stable.  Not on a statin.  Multiple thyroid nodules monitored.  Last TSH was normal.          Follow Up:   No follow-ups on file.     An After Visit Summary and PPPS were made available to the patient.

## 2022-07-19 ENCOUNTER — OFFICE VISIT (OUTPATIENT)
Dept: ORTHOPEDIC SURGERY | Facility: CLINIC | Age: 72
End: 2022-07-19

## 2022-07-19 VITALS — TEMPERATURE: 97.7 F | BODY MASS INDEX: 27.23 KG/M2 | WEIGHT: 148 LBS | HEIGHT: 62 IN

## 2022-07-19 DIAGNOSIS — M17.11 PRIMARY OSTEOARTHRITIS OF RIGHT KNEE: Primary | ICD-10-CM

## 2022-07-19 PROBLEM — M17.9 OA (OSTEOARTHRITIS) OF KNEE: Status: ACTIVE | Noted: 2022-07-19

## 2022-07-19 PROCEDURE — 99214 OFFICE O/P EST MOD 30 MIN: CPT | Performed by: ORTHOPAEDIC SURGERY

## 2022-07-19 RX ORDER — MELOXICAM 15 MG/1
15 TABLET ORAL ONCE
Status: CANCELLED | OUTPATIENT
Start: 2022-09-30 | End: 2022-07-19

## 2022-07-19 RX ORDER — POVIDONE-IODINE 10 MG/ML
SOLUTION TOPICAL ONCE
Status: CANCELLED | OUTPATIENT
Start: 2022-09-30 | End: 2022-07-19

## 2022-07-19 RX ORDER — CHLORHEXIDINE GLUCONATE 500 MG/1
CLOTH TOPICAL 2 TIMES DAILY
Status: CANCELLED | OUTPATIENT
Start: 2022-07-19

## 2022-07-19 RX ORDER — VANCOMYCIN HYDROCHLORIDE 1 G/200ML
15 INJECTION, SOLUTION INTRAVENOUS ONCE
Status: CANCELLED | OUTPATIENT
Start: 2022-09-30 | End: 2022-07-19

## 2022-07-19 RX ORDER — CEFAZOLIN SODIUM 2 G/100ML
2 INJECTION, SOLUTION INTRAVENOUS ONCE
Status: CANCELLED | OUTPATIENT
Start: 2022-09-30 | End: 2022-07-19

## 2022-07-19 RX ORDER — PREGABALIN 75 MG/1
150 CAPSULE ORAL ONCE
Status: CANCELLED | OUTPATIENT
Start: 2022-09-30 | End: 2022-07-19

## 2022-07-19 NOTE — PROGRESS NOTES
Patient: Ashley Comer  YOB: 1950 71 y.o. female  Medical Record Number: [id number]    Chief Complaints:   Chief Complaint   Patient presents with   • Right Knee - Follow-up       History of Present Illness:Ashley Comer is a 71 y.o. female who presents for follow-up of right knee pain severe lateral and anterior knee pain which is markedly progressed since last visit.  She is doing physical therapy.  The shots have been short-lived.  Pain limits basic activities and walking tolerance to less than one half block.  She has severe pain with stairs right greater than left.    Allergies:   Allergies   Allergen Reactions   • Penicillins Rash     Rash over whole body       Medications:   Current Outpatient Medications   Medication Sig Dispense Refill   • acetaminophen (TYLENOL) 500 MG tablet Take 1,000 mg by mouth Every 6 (Six) Hours As Needed for Mild Pain .     • B Complex-C (SUPER B COMPLEX PO) Take 1 tablet by mouth daily. As directed     • calcium citrate-vitamin d (CITRACAL) 200-250 MG-UNIT tablet tablet Take 1 tablet by mouth 2 (Two) Times a Day.     • cetirizine (zyrTEC) 10 MG tablet Take 5 mg by mouth Daily.     • Cholecalciferol (VITAMIN D3) 1000 UNITS capsule Take 1,000 Units by mouth Daily. Take as directed      • ibandronate (BONIVA) 150 MG tablet      • Multiple Vitamins-Minerals (PRESERVISION AREDS 2 PO) Take  by mouth Daily.     • omeprazole (priLOSEC) 40 MG capsule Take 1 capsule by mouth Daily. 90 capsule 1   • RESTASIS 0.05 % ophthalmic emulsion        No current facility-administered medications for this visit.         The following portions of the patient's history were reviewed and updated as appropriate: allergies, current medications, past family history, past medical history, past social history, past surgical history and problem list.    Review of Systems:   A 14 point review of systems was performed. All systems negative except pertinent positives/negative listed in HPI  "above    Physical Exam:   Vitals:    07/19/22 1018   Temp: 97.7 °F (36.5 °C)   Weight: 67.1 kg (148 lb)   Height: 157.5 cm (62\")       General: A and O x 3, ASA, NAD    SCLERA:    Normal    DENTITION:   Normal  Knee:  right    ALIGNMENT:     Valgus      GAIT:    Antalgic    SKIN:    No abnormality    RANGE OF MOTION:   5  -  118   DEG    STRENGTH:   4  / 5    LIGAMENTS:    No varus / valgus instability.   Negative  Lachman.    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  Continuation of conservative management vs. TKA discussed.  The patient wishes to proceed with total knee replacement.  At this point the patient has failed the full compliment of conservative treatment and stating complete understanding of the risks/benefits/ anternatives wishes to proceed with surgical treatment.    Risk and benefits of surgery were reviewed.  Including, but not limited to, blood clots or pulmonary embolism, anesthesia risk, infection, fracture, skin/leg numbness, persistent pain/crepitance/popping/catching, failure of the implant, need for future surgeries, hematoma, possible nerve or blood vessel injury, need for transfusion, and potential risk of stroke,heart attack or death, among others.  The patient understands and wishes to proceed.     It was explained that if tissue has been repaired or reconstructed, there is also an increased chance of failure which may require further management.  Following the completion of the discussion, the patient expressed understanding of this " planned course of care, all their questions were answered and consent will be obtained preoperatively.    Operative Plan: Right [employer] Oxinium Total Knee Replacement performing the procedure on an outpatient basis versus overnight depending upon what she can arrange at home with home health rehab

## 2022-07-21 ENCOUNTER — TELEPHONE (OUTPATIENT)
Dept: ORTHOPEDIC SURGERY | Facility: CLINIC | Age: 72
End: 2022-07-21

## 2022-07-21 NOTE — TELEPHONE ENCOUNTER
Hub staff attempted to follow warm transfer process and was unsuccessful     Caller: Ashley Comer    Relationship to patient: Self    Best call back number: [phone]    Patient is needing: PATIENT NEEDS TO CHANGE SOME OF HER PRE-OP APPTS/ PAT APPTS -- SHE WOULD LIKE TO SPEAK WITH DR BEY SURGERY SCHEDULER. TY!

## 2022-07-26 ENCOUNTER — LAB (OUTPATIENT)
Dept: LAB | Facility: HOSPITAL | Age: 72
End: 2022-07-26

## 2022-07-26 PROCEDURE — 80061 LIPID PANEL: CPT | Performed by: NURSE PRACTITIONER

## 2022-07-26 PROCEDURE — 84443 ASSAY THYROID STIM HORMONE: CPT | Performed by: NURSE PRACTITIONER

## 2022-07-26 PROCEDURE — 80048 BASIC METABOLIC PNL TOTAL CA: CPT | Performed by: NURSE PRACTITIONER

## 2022-09-08 DIAGNOSIS — K21.9 GASTROESOPHAGEAL REFLUX DISEASE WITHOUT ESOPHAGITIS: ICD-10-CM

## 2022-09-08 RX ORDER — OMEPRAZOLE 40 MG/1
CAPSULE, DELAYED RELEASE ORAL
Qty: 90 CAPSULE | Refills: 1 | Status: SHIPPED | OUTPATIENT
Start: 2022-09-08 | End: 2023-03-10

## 2022-09-08 NOTE — TELEPHONE ENCOUNTER
Rx Refill Note  Requested Prescriptions     Pending Prescriptions Disp Refills   • omeprazole (priLOSEC) 40 MG capsule [Pharmacy Med Name: OMEPRAZOLE DR 40 MG CAPSULE] 90 capsule 1     Sig: TAKE ONE CAPSULE BY MOUTH DAILY      Last office visit with prescribing clinician: 6/16/2022      Next office visit with prescribing clinician: 12/15/2022            Analilia Haas MA  09/08/22, 15:32 EDT

## 2022-09-15 ENCOUNTER — OFFICE VISIT (OUTPATIENT)
Dept: FAMILY MEDICINE CLINIC | Facility: CLINIC | Age: 72
End: 2022-09-15

## 2022-09-15 VITALS
TEMPERATURE: 96.8 F | DIASTOLIC BLOOD PRESSURE: 92 MMHG | SYSTOLIC BLOOD PRESSURE: 128 MMHG | HEIGHT: 62 IN | WEIGHT: 148 LBS | OXYGEN SATURATION: 98 % | RESPIRATION RATE: 16 BRPM | HEART RATE: 74 BPM | BODY MASS INDEX: 27.23 KG/M2

## 2022-09-15 DIAGNOSIS — U07.1 COVID-19: ICD-10-CM

## 2022-09-15 DIAGNOSIS — Z01.818 PREOPERATIVE CLEARANCE: Primary | ICD-10-CM

## 2022-09-15 PROCEDURE — 99213 OFFICE O/P EST LOW 20 MIN: CPT | Performed by: NURSE PRACTITIONER

## 2022-09-15 NOTE — PROGRESS NOTES
"Chief Complaint  surgery clearance    Subjective        Ashley Comer presents to [facility]  History of Present Illness  Ashley is here to follow up on Covid and get medical clearance for her knee replacement.    Her symptoms began on Thursday, tested positive upon return from [place] on Saturday. Denies fever or chills. Mild cough, sinus pressure, denies runny nose. Nasal congestion. Denies n/v/d. Reports 48 hour loss of taste and smell, has since returned.       Objective   Vital Signs:  /92 (BP Location: Right arm, Patient Position: Sitting, Cuff Size: Small Adult)   Pulse 74   Temp 96.8 °F (36 °C) (Temporal)   Resp 16   Ht 157.5 cm (62\")   Wt 67.1 kg (148 lb)   SpO2 98%   BMI 27.07 kg/m²   Estimated body mass index is 27.07 kg/m² as calculated from the following:    Height as of this encounter: 157.5 cm (62\").    Weight as of this encounter: 67.1 kg (148 lb).          Physical Exam  Vitals reviewed.   Constitutional:       General: She is not in acute distress.     Appearance: Normal appearance. She is well-developed. She is not diaphoretic.   HENT:      Head: Normocephalic and atraumatic.      Right Ear: Tympanic membrane, ear canal and external ear normal.      Left Ear: Tympanic membrane, ear canal and external ear normal.      Nose: Nose normal.      Mouth/Throat:      Mouth: Mucous membranes are moist.      Pharynx: Oropharynx is clear. Uvula midline. No oropharyngeal exudate.   Eyes:      Conjunctiva/sclera: Conjunctivae normal.      Pupils: Pupils are equal, round, and reactive to light.   Cardiovascular:      Rate and Rhythm: Normal rate and regular rhythm.      Heart sounds: Normal heart sounds. No murmur heard.    No friction rub. No gallop.   Pulmonary:      Effort: Pulmonary effort is normal. No respiratory distress.      Breath sounds: Normal breath sounds. No wheezing or rales.   Abdominal:      General: Bowel sounds are normal. There is no distension.     "  Palpations: Abdomen is soft.      Tenderness: There is no abdominal tenderness.   Musculoskeletal:      Cervical back: Neck supple.   Lymphadenopathy:      Cervical: No cervical adenopathy.   Skin:     General: Skin is warm and dry.   Neurological:      Mental Status: She is alert and oriented to person, place, and time.        Result Review :                Assessment and Plan   Diagnoses and all orders for this visit:    1. Preoperative clearance (Primary)    2. COVID-19             Follow Up   No follow-ups on file.  Patient was given instructions and counseling regarding her condition or for health maintenance advice. Please see specific information pulled into the AVS if appropriate.     Patient is medically cleared to proceed with surgery, her surgery is two weeks away, her covid symptoms are already improving 7 days out. She will reschedule if cough worsens or she develops shortness of breath, otherwise ok to proceed. Labs and EKG today as scheduled.  Answers for HPI/ROS submitted by the patient on 9/14/2022  Please describe your symptoms.: TKR scheduled for 9/30. Started with Covid symptoms 9/8. Need medical clearance in order to proceed with surgery.  Have you had these symptoms before?: Yes  How long have you been having these symptoms?: 5-7 days  Please list any medications you are currently taking for this condition.: Na  Please describe any probable cause for these symptoms. : Covid  What is the primary reason for your visit?: Other

## 2022-09-22 ENCOUNTER — TELEPHONE (OUTPATIENT)
Dept: FAMILY MEDICINE CLINIC | Facility: CLINIC | Age: 72
End: 2022-09-22

## 2022-09-22 ENCOUNTER — PRE-ADMISSION TESTING (OUTPATIENT)
Dept: PREADMISSION TESTING | Facility: HOSPITAL | Age: 72
End: 2022-09-22

## 2022-09-22 VITALS
SYSTOLIC BLOOD PRESSURE: 157 MMHG | HEIGHT: 62 IN | TEMPERATURE: 97.9 F | OXYGEN SATURATION: 100 % | DIASTOLIC BLOOD PRESSURE: 87 MMHG | HEART RATE: 68 BPM | RESPIRATION RATE: 16 BRPM | BODY MASS INDEX: 27.6 KG/M2 | WEIGHT: 150 LBS

## 2022-09-22 LAB
ANION GAP SERPL CALCULATED.3IONS-SCNC: 13.2 MMOL/L (ref 5–15)
BUN SERPL-MCNC: 18 MG/DL (ref 8–23)
BUN/CREAT SERPL: 32.7 (ref 7–25)
CALCIUM SPEC-SCNC: 9.7 MG/DL (ref 8.6–10.5)
CHLORIDE SERPL-SCNC: 104 MMOL/L (ref 98–107)
CO2 SERPL-SCNC: 22.8 MMOL/L (ref 22–29)
CREAT SERPL-MCNC: 0.55 MG/DL (ref 0.57–1)
DEPRECATED RDW RBC AUTO: 39.5 FL (ref 37–54)
EGFRCR SERPLBLD CKD-EPI 2021: 97.5 ML/MIN/1.73
ERYTHROCYTE [DISTWIDTH] IN BLOOD BY AUTOMATED COUNT: 11.8 % (ref 12.3–15.4)
GLUCOSE SERPL-MCNC: 86 MG/DL (ref 65–99)
HCT VFR BLD AUTO: 42.5 % (ref 34–46.6)
HGB BLD-MCNC: 13.8 G/DL (ref 12–15.9)
MCH RBC QN AUTO: 29.7 PG (ref 26.6–33)
MCHC RBC AUTO-ENTMCNC: 32.5 G/DL (ref 31.5–35.7)
MCV RBC AUTO: 91.6 FL (ref 79–97)
PLATELET # BLD AUTO: 331 10*3/MM3 (ref 140–450)
PMV BLD AUTO: 8.7 FL (ref 6–12)
POTASSIUM SERPL-SCNC: 4.1 MMOL/L (ref 3.5–5.2)
RBC # BLD AUTO: 4.64 10*6/MM3 (ref 3.77–5.28)
SODIUM SERPL-SCNC: 140 MMOL/L (ref 136–145)
WBC NRBC COR # BLD: 6.14 10*3/MM3 (ref 3.4–10.8)

## 2022-09-22 PROCEDURE — 80048 BASIC METABOLIC PNL TOTAL CA: CPT

## 2022-09-22 PROCEDURE — 36415 COLL VENOUS BLD VENIPUNCTURE: CPT

## 2022-09-22 PROCEDURE — 85027 COMPLETE CBC AUTOMATED: CPT

## 2022-09-22 RX ORDER — FLUTICASONE PROPIONATE 50 MCG
2 SPRAY, SUSPENSION (ML) NASAL DAILY
COMMUNITY

## 2022-09-22 NOTE — DISCHARGE INSTRUCTIONS
Take the following medications the morning of surgery:    OMEPRAZOLE      TIME OF ARRIVAL WILL BE GIVEN TO YOU BY DR REIS'S OFFICE    If you are on prescription narcotic pain medication to control your pain you may also take that medication the morning of surgery.    General Instructions:  Do not eat solid food after midnight the night before surgery.  You may drink clear liquids day of surgery but must stop at least one hour before your hospital arrival time.  It is beneficial for you to have a clear drink that contains carbohydrates the day of surgery.  We suggest a 12 to 20 ounce bottle of Gatorade or Powerade for non-diabetic patients or a 12 to 20 ounce bottle of G2 or Powerade Zero for diabetic patients. (Pediatric patients, are not advised to drink a 12 to 20 ounce carbohydrate drink)    Clear liquids are liquids you can see through.  Nothing red in color.     Plain water                               Sports drinks  Sodas                                   Gelatin (Jell-O)  Fruit juices without pulp such as white grape juice and apple juice  Popsicles that contain no fruit or yogurt  Tea or coffee (no cream or milk added)  Gatorade / Powerade  G2 / Powerade Zero      Patients who avoid smoking, chewing tobacco and alcohol for 4 weeks prior to surgery have a reduced risk of post-operative complications.  Quit smoking as many days before surgery as you can.  Do not smoke, use chewing tobacco or drink alcohol the day of surgery.   If applicable bring your C-PAP/ BI-PAP machine.  Bring any papers given to you in the doctor’s office.  Wear clean comfortable clothes.  Do not wear contact lenses, false eyelashes or make-up.  Bring a case for your glasses.   Bring crutches or walker if applicable.  Remove all piercings.  Leave jewelry and any other valuables at home.  Hair extensions with metal clips must be removed prior to surgery.  The Pre-Admission Testing nurse will instruct you to bring medications if unable to  obtain an accurate list in Pre-Admission Testing.            Preventing a Surgical Site Infection:  For 2 to 3 days before surgery, avoid shaving with a razor because the razor can irritate skin and make it easier to develop an infection.    Any areas of open skin can increase the risk of a post-operative wound infection by allowing bacteria to enter and travel throughout the body.  Notify your surgeon if you have any skin wounds / rashes even if it is not near the expected surgical site.  The area will need assessed to determine if surgery should be delayed until it is healed.  The night prior to surgery shower using a fresh bar of anti-bacterial soap (such as Dial) and clean washcloth.  Sleep in a clean bed with clean clothing.  Do not allow pets to sleep with you.  Shower on the morning of surgery using a fresh bar of anti-bacterial soap (such as Dial) and clean washcloth.  Dry with a clean towel and dress in clean clothing.  Ask your surgeon if you will be receiving antibiotics prior to surgery.  Make sure you, your family, and all healthcare providers clean their hands with soap and water or an alcohol based hand  before caring for you or your wound.    Day of surgery:  Your arrival time is approximately two hours before your scheduled surgery time.  Upon arrival, a Pre-op nurse and Anesthesiologist will review your health history, obtain vital signs, and answer questions you may have.  The only belongings needed at this time will be a list of your home medications and if applicable your C-PAP/BI-PAP machine.  A Pre-op nurse will start an IV and you may receive medication in preparation for surgery, including something to help you relax.     Please be aware that surgery does come with discomfort.  We want to make every effort to control your discomfort so please discuss any uncontrolled symptoms with your nurse.   Your doctor will most likely have prescribed pain medications.      If you are going home  after surgery you will receive individualized written care instructions before being discharged.  A responsible adult must drive you to and from the hospital on the day of your surgery and stay with you for 24 hours.  Discharge prescriptions can be filled by the hospital pharmacy during regular pharmacy hours.  If you are having surgery late in the day/evening your prescription may be e-prescribed to your pharmacy.  Please verify your pharmacy hours or chose a 24 hour pharmacy to avoid not having access to your prescription because your pharmacy has closed for the day.    If you are staying overnight following surgery, you will be transported to your hospital room following the recovery period.  [facility] has all private rooms.    If you have any questions please call Pre-Admission Testing at [phone].  Deductibles and co-payments are collected on the day of service. Please be prepared to pay the required co-pay, deductible or deposit on the day of service as defined by your plan.    Call your surgeon immediately if you experience any of the following symptoms:  Sore Throat  Shortness of Breath or difficulty breathing  Cough  Chills  Body soreness or muscle pain  Headache  Fever  New loss of taste or smell  Do not arrive for your surgery ill.  Your procedure will need to be rescheduled to another time.  You will need to call your physician before the day of surgery to avoid any unnecessary exposure to hospital staff as well as other patients.       CHLORHEXIDINE CLOTH INSTRUCTIONS  The morning of surgery follow these instructions using the Chlorhexidine cloths you've been given.  These steps reduce bacteria on the body.  Do not use the cloths near your eyes, ears mouth, genitalia or on open wounds.  Throw the cloths away after use but do not try to flush them down a toilet.      Open and remove one cloth at a time from the package.    Leave the cloth unfolded and begin the bathing.  Massage the  skin with the cloths using gentle pressure to remove bacteria.  Do not scrub harshly.   Follow the steps below with one 2% CHG cloth per area (6 total cloths).  One cloth for neck, shoulders and chest.  One cloth for both arms, hands, fingers and underarms (do underarms last).  One cloth for the abdomen followed by groin.  One cloth for right leg and foot including between the toes.  One cloth for left leg and foot including between the toes.  The last cloth is to be used for the back of the neck, back and buttocks.    Allow the CHG to air dry 3 minutes on the skin which will give it time to work and decrease the chance of irritation.  The skin may feel sticky until it is dry.  Do not rinse with water or any other liquid or you will lose the beneficial effects of the CHG.  If mild skin irritation occurs, do rinse the skin to remove the CHG.  Report this to the nurse at time of admission.  Do not apply lotions, creams, ointments, deodorants or perfumes after using the clothes. Dress in clean clothes before coming to the hospital.

## 2022-09-23 ENCOUNTER — OFFICE VISIT (OUTPATIENT)
Dept: ORTHOPEDIC SURGERY | Facility: CLINIC | Age: 72
End: 2022-09-23

## 2022-09-23 DIAGNOSIS — M17.11 PRIMARY OSTEOARTHRITIS OF RIGHT KNEE: Primary | ICD-10-CM

## 2022-09-23 PROCEDURE — S0260 H&P FOR SURGERY: HCPCS | Performed by: NURSE PRACTITIONER

## 2022-09-23 NOTE — H&P
Patient: Ashley Comer    Date of Admission: 9/30/2022    YOB: 1950    Medical Record Number: [id number]    Admitting Physician: Dr. Daniele Mcelroy    Reason for Admission: End Stage Right Knee OA    History of Present Illness: 71 y.o. female presents with severe end stage knee osteoarthritis which has not been responsive to the full compliment of conservative measures. Despite conservative attempts, there is still severe, constant activity limiting pain. Given the severity of the pain, the patient has elected to proceed with knee replacement.    Allergies:   Allergies   Allergen Reactions   • Penicillins Rash     Rash over whole body         Current Medications:  Home Medications:    Current Outpatient Medications on File Prior to Visit   Medication Sig   • acetaminophen (TYLENOL) 500 MG tablet Take 1,000 mg by mouth Every 6 (Six) Hours As Needed for Mild Pain .   • B Complex-C (SUPER B COMPLEX PO) Take 1 tablet by mouth Daily. PT HOLDING FOR SURGERY   • calcium citrate-vitamin d (CITRACAL) 200-250 MG-UNIT tablet tablet Take 1 tablet by mouth 2 (Two) Times a Day. PT HOLDING FOR SURGERY   • cetirizine (zyrTEC) 10 MG tablet Take 10 mg by mouth Daily.   • Chlorhexidine Gluconate 2 % pads Apply  topically. As directed pre op   • Cholecalciferol (VITAMIN D3) 1000 UNITS capsule Take 1,000 Units by mouth Daily. PT HOLDING FOR SURGERY   • fluticasone (FLONASE) 50 MCG/ACT nasal spray 2 sprays into the nostril(s) as directed by provider Daily.   • ibandronate (BONIVA) 150 MG tablet Take 150 mg by mouth Every 30 (Thirty) Days. DUE ON SEPT 24/2022   • omeprazole (priLOSEC) 40 MG capsule TAKE ONE CAPSULE BY MOUTH DAILY (Patient taking differently: Take 40 mg by mouth Daily.)   • Pediatric Multiple Vit-C-FA (FLINTSTONES MULTIVITAMIN PO) Take 1 tablet by mouth Daily. PT HOLDING FOR SURGERY     No current facility-administered medications on file prior to visit.     PRN Meds:.    PMH:     Past Medical History:    Diagnosis Date   • Allergic 1970    pcn   • Anemia    • Arthritis 2006   • Arthritis of neck 2017   • Bradycardia    • Cancer (HCC) 2016    GIST sm bowel   • Cataract     removed 2014   • Colon polyp 02/22/2016    As report to me by Dr Rodriguez   • Degenerative disc disease, cervical    • Disease of thyroid gland     THYROID NODULES   • Edema    • Esophageal reflux    • GERD (gastroesophageal reflux disease)    • Headache    • History of GI bleed    • History of transfusion 02/2016    6 UNITS   • Knee swelling 2006   • Left knee pain    • Migraine    • Neck pain    • Osteopenia    • Other chest pain     RESOLVED   • 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   • Urinary tract infection 1970       PF/Surg/Soc Hx:     Past Surgical History:   Procedure Laterality Date   • APPENDECTOMY  2/23/2016   • CATARACT EXTRACTION Bilateral 2014   • COLONOSCOPY N/A 7/28/2017    Procedure: COLONOSCOPY INTO CECUM WITH POLYPECTOMY;  Surgeon: Mini Meraz MD;  Location: [facility] ENDOSCOPY;  Service:    • COLONOSCOPY N/A 7/30/2021    Procedure: COLONOSCOPY;  Surgeon: Mini Meraz MD;  Location: [facility] MAIN OR;  Service: Gastroenterology;  Laterality: N/A;  normal   • ENDOSCOPY N/A 7/28/2017    Procedure: ESOPHAGOGASTRODUODENOSCOPY WITH BIOPSIES;  Surgeon: Mini Meraz MD;  Location: [facility] ENDOSCOPY;  Service:    • ENDOSCOPY N/A 7/30/2021    Procedure: ESOPHAGOGASTRODUODENOSCOPY;  Surgeon: Mini Meraz MD;  Location: [facility] [place] MAIN OR;  Service: Gastroenterology;  Laterality: N/A;  gastritis, esophagitis   • FOOT SURGERY Right 1954    DEBRIDMENT   • LAPAROSCOPIC APPENDECTOMY     • SMALL INTESTINE SURGERY  02/2016    for GIST    • THYROID SURGERY Left 1993    thyroid lobectomy    • VITRECTOMY Right 11/2015        Social History     Occupational History   • Occupation: RN      Employer: [employer]     Employer: RETIRED   Tobacco  Use   • Smoking status: Former Smoker     Packs/day: 1.00     Years: 25.00     Pack years: 25.00     Types: Cigarettes, Cigarettes     Start date: 1968     Quit date: 1992     Years since quittin.7   • Smokeless tobacco: Never Used   Vaping Use   • Vaping Use: Never used   Substance and Sexual Activity   • Alcohol use: Yes     Comment: occas   • Drug use: No   • Sexual activity: Not Currently     Partners: Male      Social History     Social History Narrative    The patient'  was diagnosed with esophageal cancer on upper GI endoscopy by Dr. Jose Enrique Yuan.         Family History   Problem Relation Age of Onset   • Dementia Mother    • Hypertension Mother    • Tuberculosis Mother    • Pneumonia Mother    • Broken bones Mother         Wrist, hip   • Osteoporosis Mother    • Prostate cancer Father    • Tuberculosis Father    • Cancer Father         prostate w/bone mets   • Cancer Sister         Throat   • Breast cancer Maternal Aunt    • Lung cancer Paternal Aunt    • Brain cancer Paternal Aunt    • Breast cancer Paternal Aunt    • Breast cancer Sister    • Cancer Maternal Aunt         Breast   • Cancer Paternal Aunt         Breast   • Cancer Paternal Aunt         Lung   • Cancer Paternal Aunt         Lung   • Cancer Sister         Malig Teratoma   • Cancer Sister         Breast   • Cancer Brother         Prostate   • Malig Hyperthermia Neg Hx          Review of Systems:   A 14 point review of systems was performed, pertinent positives discussed above, all other systems are negative    Physical Exam: 71 y.o. female  Phone visit--not performed    Xrays:  -3 views (AP, lateral, and sunrise) were reviewed demonstrating end-stage OA with bone on bone articulation.    Assessment:  End-stage Right knee osteoarthritis. Conservative measures have failed.      Plan:  The plan is to proceed with Right Total Knee Replacement. The patient voiced understanding of the risks, benefits, and alternative forms of  treatment that were discussed with Dr Mcelroy at the time of scheduling. Same day kehinde Munoz, APRN  9/23/2022

## 2022-09-30 ENCOUNTER — ANESTHESIA (OUTPATIENT)
Dept: PERIOP | Facility: HOSPITAL | Age: 72
End: 2022-09-30

## 2022-09-30 ENCOUNTER — HOSPITAL ENCOUNTER (OUTPATIENT)
Facility: HOSPITAL | Age: 72
Discharge: HOME HEALTH | End: 2022-09-30
Attending: ORTHOPAEDIC SURGERY | Admitting: ORTHOPAEDIC SURGERY

## 2022-09-30 ENCOUNTER — APPOINTMENT (OUTPATIENT)
Dept: GENERAL RADIOLOGY | Facility: HOSPITAL | Age: 72
End: 2022-09-30

## 2022-09-30 ENCOUNTER — ANESTHESIA EVENT (OUTPATIENT)
Dept: PERIOP | Facility: HOSPITAL | Age: 72
End: 2022-09-30

## 2022-09-30 ENCOUNTER — HOME HEALTH ADMISSION (OUTPATIENT)
Dept: HOME HEALTH SERVICES | Facility: HOME HEALTHCARE | Age: 72
End: 2022-09-30

## 2022-09-30 VITALS
OXYGEN SATURATION: 95 % | HEART RATE: 69 BPM | RESPIRATION RATE: 16 BRPM | DIASTOLIC BLOOD PRESSURE: 77 MMHG | TEMPERATURE: 97.8 F | SYSTOLIC BLOOD PRESSURE: 144 MMHG

## 2022-09-30 DIAGNOSIS — M17.11 PRIMARY OSTEOARTHRITIS OF RIGHT KNEE: ICD-10-CM

## 2022-09-30 DIAGNOSIS — Z96.651 S/P TKR (TOTAL KNEE REPLACEMENT), RIGHT: Primary | ICD-10-CM

## 2022-09-30 PROCEDURE — 73560 X-RAY EXAM OF KNEE 1 OR 2: CPT

## 2022-09-30 PROCEDURE — 25010000002 ONDANSETRON PER 1 MG: Performed by: NURSE ANESTHETIST, CERTIFIED REGISTERED

## 2022-09-30 PROCEDURE — 25010000002 MORPHINE PER 10 MG: Performed by: ORTHOPAEDIC SURGERY

## 2022-09-30 PROCEDURE — 25010000002 MIDAZOLAM PER 1 MG: Performed by: ANESTHESIOLOGY

## 2022-09-30 PROCEDURE — 25010000002 VANCOMYCIN PER 500 MG: Performed by: ORTHOPAEDIC SURGERY

## 2022-09-30 PROCEDURE — 76942 ECHO GUIDE FOR BIOPSY: CPT | Performed by: ORTHOPAEDIC SURGERY

## 2022-09-30 PROCEDURE — C1713 ANCHOR/SCREW BN/BN,TIS/BN: HCPCS | Performed by: ORTHOPAEDIC SURGERY

## 2022-09-30 PROCEDURE — G0378 HOSPITAL OBSERVATION PER HR: HCPCS

## 2022-09-30 PROCEDURE — 25010000002 PROPOFOL 10 MG/ML EMULSION: Performed by: NURSE ANESTHETIST, CERTIFIED REGISTERED

## 2022-09-30 PROCEDURE — 27447 TOTAL KNEE ARTHROPLASTY: CPT | Performed by: ORTHOPAEDIC SURGERY

## 2022-09-30 PROCEDURE — 25010000002 DEXAMETHASONE PER 1 MG: Performed by: ANESTHESIOLOGY

## 2022-09-30 PROCEDURE — A9270 NON-COVERED ITEM OR SERVICE: HCPCS | Performed by: ORTHOPAEDIC SURGERY

## 2022-09-30 PROCEDURE — 25010000002 ROPIVACAINE PER 1 MG: Performed by: ORTHOPAEDIC SURGERY

## 2022-09-30 PROCEDURE — 25010000002 DROPERIDOL PER 5 MG: Performed by: NURSE ANESTHETIST, CERTIFIED REGISTERED

## 2022-09-30 PROCEDURE — 97162 PT EVAL MOD COMPLEX 30 MIN: CPT

## 2022-09-30 PROCEDURE — 63710000001 PREGABALIN 75 MG CAPSULE: Performed by: ORTHOPAEDIC SURGERY

## 2022-09-30 PROCEDURE — 25010000002 FENTANYL CITRATE (PF) 50 MCG/ML SOLUTION: Performed by: ANESTHESIOLOGY

## 2022-09-30 PROCEDURE — 25010000002 CEFAZOLIN IN DEXTROSE 2-4 GM/100ML-% SOLUTION: Performed by: ORTHOPAEDIC SURGERY

## 2022-09-30 PROCEDURE — 63710000001 MELOXICAM 15 MG TABLET: Performed by: ORTHOPAEDIC SURGERY

## 2022-09-30 PROCEDURE — 27447 TOTAL KNEE ARTHROPLASTY: CPT | Performed by: NURSE PRACTITIONER

## 2022-09-30 PROCEDURE — 25010000002 EPINEPHRINE 1 MG/ML SOLUTION 30 ML VIAL: Performed by: ORTHOPAEDIC SURGERY

## 2022-09-30 PROCEDURE — C1776 JOINT DEVICE (IMPLANTABLE): HCPCS | Performed by: ORTHOPAEDIC SURGERY

## 2022-09-30 PROCEDURE — 25010000002 KETOROLAC TROMETHAMINE PER 15 MG: Performed by: ORTHOPAEDIC SURGERY

## 2022-09-30 PROCEDURE — 97530 THERAPEUTIC ACTIVITIES: CPT

## 2022-09-30 PROCEDURE — 25010000002 FENTANYL CITRATE (PF) 100 MCG/2ML SOLUTION: Performed by: NURSE ANESTHETIST, CERTIFIED REGISTERED

## 2022-09-30 DEVICE — LEGION CRUCIATE RETAINING OXINIUM                                    FEMORAL SIZE 4 RIGHT
Type: IMPLANTABLE DEVICE | Site: KNEE | Status: FUNCTIONAL
Brand: LEGION

## 2022-09-30 DEVICE — VIOLET ANTIBACTERIAL POLYDIOXANONE, KNOTLESS TISSUE CONTROL DEVICE
Type: IMPLANTABLE DEVICE | Site: KNEE | Status: FUNCTIONAL
Brand: STRATAFIX

## 2022-09-30 DEVICE — PALACOS® R IS A FAST-CURING, RADIOPAQUE, POLY(METHYL METHACRYLATE)-BASED BONE CEMENT.PALACOS ® R CONTAINS THE X-RAY CONTRAST MEDIUM ZIRCONIUM DIOXIDE. TO IMPROVE VISIBILITY IN THE SURGICAL FIELD PALACOS ® R HAS BEEN COLOURED WITH CHLOROPHYLL (E141). THE BONE CEMENT IS PREPARED DIRECTLY BEFORE USE BY MIXING A POLYMER POWDER COMPONENT WITH A LIQUID MONOMER COMPONENT. A DUCTILE DOUGH FORMS WHICH CURES WITHIN A FEW MINUTES.
Type: IMPLANTABLE DEVICE | Site: KNEE | Status: FUNCTIONAL
Brand: PALACOS®

## 2022-09-30 DEVICE — IMPLANTABLE DEVICE: Type: IMPLANTABLE DEVICE | Site: KNEE | Status: FUNCTIONAL

## 2022-09-30 DEVICE — GENESIS II BICONVEX PATELLA 26MM
Type: IMPLANTABLE DEVICE | Site: KNEE | Status: FUNCTIONAL
Brand: GENESIS II

## 2022-09-30 DEVICE — KNOTLESS TISSUE CONTROL DEVICE, UNDYED UNIDIRECTIONAL (ANTIBACTERIAL) SYNTHETIC ABSORBABLE DEVICE
Type: IMPLANTABLE DEVICE | Site: KNEE | Status: FUNCTIONAL
Brand: STRATAFIX

## 2022-09-30 DEVICE — GENESIS II NON-POROUS TIBIAL                                    BASEPLATE SIZE 3 RIGHT
Type: IMPLANTABLE DEVICE | Site: KNEE | Status: FUNCTIONAL
Brand: GENESIS II

## 2022-09-30 DEVICE — LEGION CRUCIATE RETAINING HIGH                                    FLEX HIGHLY CROSS LINKED                                    POLYETHYLENE SIZE 3-4 9MM
Type: IMPLANTABLE DEVICE | Site: KNEE | Status: FUNCTIONAL
Brand: LEGION

## 2022-09-30 RX ORDER — ASPIRIN 81 MG/1
81 TABLET ORAL EVERY 12 HOURS SCHEDULED
Status: CANCELLED | OUTPATIENT
Start: 2022-10-01

## 2022-09-30 RX ORDER — LABETALOL HYDROCHLORIDE 5 MG/ML
5 INJECTION, SOLUTION INTRAVENOUS
Status: DISCONTINUED | OUTPATIENT
Start: 2022-09-30 | End: 2022-09-30 | Stop reason: HOSPADM

## 2022-09-30 RX ORDER — HYDROCODONE BITARTRATE AND ACETAMINOPHEN 7.5; 325 MG/1; MG/1
1 TABLET ORAL EVERY 4 HOURS PRN
Status: CANCELLED | OUTPATIENT
Start: 2022-09-30 | End: 2022-10-07

## 2022-09-30 RX ORDER — ONDANSETRON 4 MG/1
4 TABLET, FILM COATED ORAL EVERY 8 HOURS PRN
Qty: 10 TABLET | Refills: 0 | Status: SHIPPED | OUTPATIENT
Start: 2022-09-30 | End: 2022-12-15

## 2022-09-30 RX ORDER — BUPIVACAINE HYDROCHLORIDE AND EPINEPHRINE 5; 5 MG/ML; UG/ML
INJECTION, SOLUTION EPIDURAL; INTRACAUDAL; PERINEURAL
Status: COMPLETED | OUTPATIENT
Start: 2022-09-30 | End: 2022-09-30

## 2022-09-30 RX ORDER — LIDOCAINE HYDROCHLORIDE 20 MG/ML
INJECTION, SOLUTION INFILTRATION; PERINEURAL AS NEEDED
Status: DISCONTINUED | OUTPATIENT
Start: 2022-09-30 | End: 2022-09-30 | Stop reason: SURG

## 2022-09-30 RX ORDER — ACETAMINOPHEN 500 MG
1000 TABLET ORAL EVERY 6 HOURS PRN
Status: CANCELLED | OUTPATIENT
Start: 2022-09-30

## 2022-09-30 RX ORDER — SODIUM CHLORIDE 0.9 % (FLUSH) 0.9 %
10 SYRINGE (ML) INJECTION EVERY 12 HOURS SCHEDULED
Status: DISCONTINUED | OUTPATIENT
Start: 2022-09-30 | End: 2022-09-30 | Stop reason: HOSPADM

## 2022-09-30 RX ORDER — NALOXONE HCL 0.4 MG/ML
0.2 VIAL (ML) INJECTION AS NEEDED
Status: DISCONTINUED | OUTPATIENT
Start: 2022-09-30 | End: 2022-09-30 | Stop reason: HOSPADM

## 2022-09-30 RX ORDER — FLUMAZENIL 0.1 MG/ML
0.2 INJECTION INTRAVENOUS AS NEEDED
Status: DISCONTINUED | OUTPATIENT
Start: 2022-09-30 | End: 2022-09-30 | Stop reason: HOSPADM

## 2022-09-30 RX ORDER — MELOXICAM 15 MG/1
15 TABLET ORAL ONCE
Status: COMPLETED | OUTPATIENT
Start: 2022-09-30 | End: 2022-09-30

## 2022-09-30 RX ORDER — HYDROCODONE BITARTRATE AND ACETAMINOPHEN 7.5; 325 MG/1; MG/1
2 TABLET ORAL EVERY 4 HOURS PRN
Status: CANCELLED | OUTPATIENT
Start: 2022-09-30 | End: 2022-10-07

## 2022-09-30 RX ORDER — SODIUM CHLORIDE, SODIUM LACTATE, POTASSIUM CHLORIDE, CALCIUM CHLORIDE 600; 310; 30; 20 MG/100ML; MG/100ML; MG/100ML; MG/100ML
9 INJECTION, SOLUTION INTRAVENOUS CONTINUOUS PRN
Status: DISCONTINUED | OUTPATIENT
Start: 2022-09-30 | End: 2022-09-30 | Stop reason: HOSPADM

## 2022-09-30 RX ORDER — ONDANSETRON 4 MG/1
4 TABLET, FILM COATED ORAL EVERY 6 HOURS PRN
Status: DISCONTINUED | OUTPATIENT
Start: 2022-09-30 | End: 2022-09-30 | Stop reason: HOSPADM

## 2022-09-30 RX ORDER — FENTANYL CITRATE 50 UG/ML
25 INJECTION, SOLUTION INTRAMUSCULAR; INTRAVENOUS
Status: DISCONTINUED | OUTPATIENT
Start: 2022-09-30 | End: 2022-09-30 | Stop reason: HOSPADM

## 2022-09-30 RX ORDER — ASPIRIN 81 MG/1
TABLET ORAL
Qty: 60 TABLET | Refills: 0 | Status: SHIPPED | OUTPATIENT
Start: 2022-09-30 | End: 2022-12-15

## 2022-09-30 RX ORDER — TRANEXAMIC ACID 100 MG/ML
INJECTION, SOLUTION INTRAVENOUS AS NEEDED
Status: DISCONTINUED | OUTPATIENT
Start: 2022-09-30 | End: 2022-09-30 | Stop reason: SURG

## 2022-09-30 RX ORDER — DEXAMETHASONE SODIUM PHOSPHATE 4 MG/ML
INJECTION, SOLUTION INTRA-ARTICULAR; INTRALESIONAL; INTRAMUSCULAR; INTRAVENOUS; SOFT TISSUE
Status: COMPLETED | OUTPATIENT
Start: 2022-09-30 | End: 2022-09-30

## 2022-09-30 RX ORDER — PROMETHAZINE HYDROCHLORIDE 25 MG/1
12.5 TABLET ORAL EVERY 4 HOURS PRN
Status: DISCONTINUED | OUTPATIENT
Start: 2022-09-30 | End: 2022-09-30 | Stop reason: HOSPADM

## 2022-09-30 RX ORDER — ONDANSETRON 2 MG/ML
4 INJECTION INTRAMUSCULAR; INTRAVENOUS ONCE AS NEEDED
Status: DISCONTINUED | OUTPATIENT
Start: 2022-09-30 | End: 2022-09-30 | Stop reason: HOSPADM

## 2022-09-30 RX ORDER — HYDROCODONE BITARTRATE AND ACETAMINOPHEN 7.5; 325 MG/1; MG/1
1 TABLET ORAL ONCE AS NEEDED
Status: DISCONTINUED | OUTPATIENT
Start: 2022-09-30 | End: 2022-09-30 | Stop reason: HOSPADM

## 2022-09-30 RX ORDER — FENTANYL CITRATE 50 UG/ML
50 INJECTION, SOLUTION INTRAMUSCULAR; INTRAVENOUS
Status: DISCONTINUED | OUTPATIENT
Start: 2022-09-30 | End: 2022-09-30 | Stop reason: HOSPADM

## 2022-09-30 RX ORDER — ACETAMINOPHEN 325 MG/1
650 TABLET ORAL EVERY 6 HOURS PRN
Status: DISCONTINUED | OUTPATIENT
Start: 2022-09-30 | End: 2022-09-30 | Stop reason: HOSPADM

## 2022-09-30 RX ORDER — SODIUM CHLORIDE 0.9 % (FLUSH) 0.9 %
10 SYRINGE (ML) INJECTION AS NEEDED
Status: DISCONTINUED | OUTPATIENT
Start: 2022-09-30 | End: 2022-09-30 | Stop reason: HOSPADM

## 2022-09-30 RX ORDER — DIPHENHYDRAMINE HYDROCHLORIDE 50 MG/ML
12.5 INJECTION INTRAMUSCULAR; INTRAVENOUS
Status: DISCONTINUED | OUTPATIENT
Start: 2022-09-30 | End: 2022-09-30 | Stop reason: HOSPADM

## 2022-09-30 RX ORDER — MIDAZOLAM HYDROCHLORIDE 1 MG/ML
1 INJECTION INTRAMUSCULAR; INTRAVENOUS
Status: DISCONTINUED | OUTPATIENT
Start: 2022-09-30 | End: 2022-09-30 | Stop reason: HOSPADM

## 2022-09-30 RX ORDER — EPHEDRINE SULFATE 50 MG/ML
5 INJECTION, SOLUTION INTRAVENOUS ONCE AS NEEDED
Status: DISCONTINUED | OUTPATIENT
Start: 2022-09-30 | End: 2022-09-30 | Stop reason: HOSPADM

## 2022-09-30 RX ORDER — CEFAZOLIN SODIUM 2 G/100ML
2 INJECTION, SOLUTION INTRAVENOUS EVERY 8 HOURS
Status: CANCELLED | OUTPATIENT
Start: 2022-09-30 | End: 2022-10-01

## 2022-09-30 RX ORDER — FAMOTIDINE 10 MG/ML
20 INJECTION, SOLUTION INTRAVENOUS
Status: DISCONTINUED | OUTPATIENT
Start: 2022-09-30 | End: 2022-09-30 | Stop reason: HOSPADM

## 2022-09-30 RX ORDER — VANCOMYCIN HYDROCHLORIDE 1 G/200ML
15 INJECTION, SOLUTION INTRAVENOUS ONCE
Status: COMPLETED | OUTPATIENT
Start: 2022-09-30 | End: 2022-09-30

## 2022-09-30 RX ORDER — PROPOFOL 10 MG/ML
VIAL (ML) INTRAVENOUS AS NEEDED
Status: DISCONTINUED | OUTPATIENT
Start: 2022-09-30 | End: 2022-09-30 | Stop reason: SURG

## 2022-09-30 RX ORDER — PREGABALIN 75 MG/1
150 CAPSULE ORAL ONCE
Status: COMPLETED | OUTPATIENT
Start: 2022-09-30 | End: 2022-09-30

## 2022-09-30 RX ORDER — PROMETHAZINE HYDROCHLORIDE 25 MG/1
25 SUPPOSITORY RECTAL ONCE AS NEEDED
Status: DISCONTINUED | OUTPATIENT
Start: 2022-09-30 | End: 2022-09-30 | Stop reason: HOSPADM

## 2022-09-30 RX ORDER — CEFAZOLIN SODIUM 2 G/100ML
2 INJECTION, SOLUTION INTRAVENOUS ONCE
Status: COMPLETED | OUTPATIENT
Start: 2022-09-30 | End: 2022-09-30

## 2022-09-30 RX ORDER — PROMETHAZINE HYDROCHLORIDE 25 MG/1
25 TABLET ORAL ONCE AS NEEDED
Status: DISCONTINUED | OUTPATIENT
Start: 2022-09-30 | End: 2022-09-30 | Stop reason: HOSPADM

## 2022-09-30 RX ORDER — DROPERIDOL 2.5 MG/ML
INJECTION, SOLUTION INTRAMUSCULAR; INTRAVENOUS AS NEEDED
Status: DISCONTINUED | OUTPATIENT
Start: 2022-09-30 | End: 2022-09-30 | Stop reason: SURG

## 2022-09-30 RX ORDER — ONDANSETRON 2 MG/ML
INJECTION INTRAMUSCULAR; INTRAVENOUS AS NEEDED
Status: DISCONTINUED | OUTPATIENT
Start: 2022-09-30 | End: 2022-09-30 | Stop reason: SURG

## 2022-09-30 RX ORDER — POLYETHYLENE GLYCOL 3350 17 G/17G
17 POWDER, FOR SOLUTION ORAL 2 TIMES DAILY
Qty: 238 G | Refills: 0 | Status: SHIPPED | OUTPATIENT
Start: 2022-09-30 | End: 2022-10-07

## 2022-09-30 RX ORDER — ROCURONIUM BROMIDE 10 MG/ML
INJECTION, SOLUTION INTRAVENOUS AS NEEDED
Status: DISCONTINUED | OUTPATIENT
Start: 2022-09-30 | End: 2022-09-30 | Stop reason: SURG

## 2022-09-30 RX ORDER — DEXAMETHASONE SODIUM PHOSPHATE 4 MG/ML
INJECTION, SOLUTION INTRA-ARTICULAR; INTRALESIONAL; INTRAMUSCULAR; INTRAVENOUS; SOFT TISSUE AS NEEDED
Status: DISCONTINUED | OUTPATIENT
Start: 2022-09-30 | End: 2022-09-30 | Stop reason: SURG

## 2022-09-30 RX ORDER — MELOXICAM 15 MG/1
15 TABLET ORAL DAILY
Qty: 14 TABLET | Refills: 0 | Status: SHIPPED | OUTPATIENT
Start: 2022-09-30 | End: 2022-10-14

## 2022-09-30 RX ORDER — POVIDONE-IODINE 10 MG/ML
SOLUTION TOPICAL ONCE
Status: COMPLETED | OUTPATIENT
Start: 2022-09-30 | End: 2022-09-30

## 2022-09-30 RX ORDER — HYDRALAZINE HYDROCHLORIDE 20 MG/ML
5 INJECTION INTRAMUSCULAR; INTRAVENOUS
Status: DISCONTINUED | OUTPATIENT
Start: 2022-09-30 | End: 2022-09-30 | Stop reason: HOSPADM

## 2022-09-30 RX ORDER — HYDROCODONE BITARTRATE AND ACETAMINOPHEN 7.5; 325 MG/1; MG/1
1-2 TABLET ORAL EVERY 4 HOURS PRN
Qty: 60 TABLET | Refills: 0 | Status: SHIPPED | OUTPATIENT
Start: 2022-09-30 | End: 2022-12-15

## 2022-09-30 RX ORDER — OXYCODONE AND ACETAMINOPHEN 7.5; 325 MG/1; MG/1
1 TABLET ORAL EVERY 4 HOURS PRN
Status: DISCONTINUED | OUTPATIENT
Start: 2022-09-30 | End: 2022-09-30 | Stop reason: HOSPADM

## 2022-09-30 RX ORDER — UREA 10 %
1 LOTION (ML) TOPICAL NIGHTLY PRN
Status: CANCELLED | OUTPATIENT
Start: 2022-09-30

## 2022-09-30 RX ORDER — PANTOPRAZOLE SODIUM 40 MG/1
40 TABLET, DELAYED RELEASE ORAL EVERY MORNING
Refills: 1 | Status: CANCELLED | OUTPATIENT
Start: 2022-09-30

## 2022-09-30 RX ORDER — DIPHENHYDRAMINE HCL 25 MG
25 CAPSULE ORAL
Status: DISCONTINUED | OUTPATIENT
Start: 2022-09-30 | End: 2022-09-30 | Stop reason: HOSPADM

## 2022-09-30 RX ORDER — FENTANYL CITRATE 50 UG/ML
INJECTION, SOLUTION INTRAMUSCULAR; INTRAVENOUS AS NEEDED
Status: DISCONTINUED | OUTPATIENT
Start: 2022-09-30 | End: 2022-09-30 | Stop reason: SURG

## 2022-09-30 RX ORDER — MAGNESIUM HYDROXIDE 1200 MG/15ML
LIQUID ORAL AS NEEDED
Status: DISCONTINUED | OUTPATIENT
Start: 2022-09-30 | End: 2022-09-30 | Stop reason: HOSPADM

## 2022-09-30 RX ORDER — HYDROMORPHONE HYDROCHLORIDE 1 MG/ML
0.5 INJECTION, SOLUTION INTRAMUSCULAR; INTRAVENOUS; SUBCUTANEOUS
Status: DISCONTINUED | OUTPATIENT
Start: 2022-09-30 | End: 2022-09-30 | Stop reason: HOSPADM

## 2022-09-30 RX ORDER — HYDROCODONE BITARTRATE AND ACETAMINOPHEN 7.5; 325 MG/1; MG/1
1 TABLET ORAL EVERY 4 HOURS PRN
Status: DISCONTINUED | OUTPATIENT
Start: 2022-09-30 | End: 2022-09-30 | Stop reason: HOSPADM

## 2022-09-30 RX ADMIN — MELOXICAM 15 MG: 15 TABLET ORAL at 07:41

## 2022-09-30 RX ADMIN — DROPERIDOL 0.62 MG: 2.5 INJECTION, SOLUTION INTRAMUSCULAR; INTRAVENOUS at 10:34

## 2022-09-30 RX ADMIN — PREGABALIN 150 MG: 75 CAPSULE ORAL at 07:41

## 2022-09-30 RX ADMIN — SODIUM CHLORIDE, POTASSIUM CHLORIDE, SODIUM LACTATE AND CALCIUM CHLORIDE: 600; 310; 30; 20 INJECTION, SOLUTION INTRAVENOUS at 10:26

## 2022-09-30 RX ADMIN — PROPOFOL 150 MG: 10 INJECTION, EMULSION INTRAVENOUS at 09:52

## 2022-09-30 RX ADMIN — MIDAZOLAM 1 MG: 1 INJECTION, SOLUTION INTRAMUSCULAR; INTRAVENOUS at 08:15

## 2022-09-30 RX ADMIN — BUPIVACAINE HYDROCHLORIDE AND EPINEPHRINE BITARTRATE 20 ML: 5; .005 INJECTION, SOLUTION EPIDURAL; INTRACAUDAL; PERINEURAL at 08:26

## 2022-09-30 RX ADMIN — ROCURONIUM BROMIDE 50 MG: 10 INJECTION, SOLUTION INTRAVENOUS at 09:52

## 2022-09-30 RX ADMIN — DEXAMETHASONE SODIUM PHOSPHATE 8 MG: 4 INJECTION, SOLUTION INTRA-ARTICULAR; INTRALESIONAL; INTRAMUSCULAR; INTRAVENOUS; SOFT TISSUE at 10:00

## 2022-09-30 RX ADMIN — FENTANYL CITRATE 50 MCG: 50 INJECTION, SOLUTION INTRAMUSCULAR; INTRAVENOUS at 10:06

## 2022-09-30 RX ADMIN — POVIDONE-IODINE: 10 SOLUTION TOPICAL at 07:52

## 2022-09-30 RX ADMIN — LIDOCAINE HYDROCHLORIDE 100 MG: 20 INJECTION, SOLUTION INFILTRATION; PERINEURAL at 09:52

## 2022-09-30 RX ADMIN — CEFAZOLIN SODIUM 2 G: 2 INJECTION, SOLUTION INTRAVENOUS at 09:38

## 2022-09-30 RX ADMIN — FENTANYL CITRATE 25 MCG: 50 INJECTION INTRAMUSCULAR; INTRAVENOUS at 08:15

## 2022-09-30 RX ADMIN — ONDANSETRON 4 MG: 2 INJECTION INTRAMUSCULAR; INTRAVENOUS at 11:01

## 2022-09-30 RX ADMIN — DEXAMETHASONE SODIUM PHOSPHATE 4 MG: 4 INJECTION, SOLUTION INTRAMUSCULAR; INTRAVENOUS at 08:26

## 2022-09-30 RX ADMIN — PROPOFOL 75 MCG/KG/MIN: 10 INJECTION, EMULSION INTRAVENOUS at 09:54

## 2022-09-30 RX ADMIN — TRANEXAMIC ACID 1000 MG: 1 INJECTION, SOLUTION INTRAVENOUS at 10:47

## 2022-09-30 RX ADMIN — VANCOMYCIN HYDROCHLORIDE 1000 MG: 1 INJECTION, SOLUTION INTRAVENOUS at 08:05

## 2022-09-30 RX ADMIN — SUGAMMADEX 200 MG: 100 INJECTION, SOLUTION INTRAVENOUS at 11:08

## 2022-09-30 RX ADMIN — FENTANYL CITRATE 50 MCG: 50 INJECTION, SOLUTION INTRAMUSCULAR; INTRAVENOUS at 09:52

## 2022-09-30 RX ADMIN — SODIUM CHLORIDE, POTASSIUM CHLORIDE, SODIUM LACTATE AND CALCIUM CHLORIDE 500 ML: 600; 310; 30; 20 INJECTION, SOLUTION INTRAVENOUS at 07:43

## 2022-09-30 NOTE — ANESTHESIA POSTPROCEDURE EVALUATION
Patient: Ashley Comer    Procedure Summary     Date: 09/30/22 Room / Location:  [facility] [address] [place] [address] [facility]    Anesthesia Start: 0941 Anesthesia Stop: 1138    Procedure: TOTAL KNEE ARTHROPLASTY (Right Knee) Diagnosis:       Primary osteoarthritis of right knee      (Primary osteoarthritis of right knee [M17.11])    Surgeons: Daniele Mcelroy MD Provider: Victor Manuel Sal MD    Anesthesia Type: general with block ASA Status: 2          Anesthesia Type: general with block    Vitals  Vitals Value Taken Time   /78 09/30/22 1230   Temp 36.6 °C (97.8 °F) 09/30/22 1230   Pulse 78 09/30/22 1236   Resp 16 09/30/22 1230   SpO2 94 % 09/30/22 1236   Vitals shown include unvalidated device data.        Post Anesthesia Care and Evaluation    Patient location during evaluation: bedside  Patient participation: complete - patient participated  Level of consciousness: awake and alert  Pain management: adequate    Airway patency: patent  Anesthetic complications: No anesthetic complications  PONV Status: controlled  Cardiovascular status: blood pressure returned to baseline and acceptable  Respiratory status: acceptable  Hydration status: acceptable

## 2022-09-30 NOTE — ANESTHESIA PROCEDURE NOTES
Airway  Urgency: elective    Date/Time: 9/30/2022 9:54 AM  Airway not difficult    General Information and Staff    Patient location during procedure: OR  CRNA/CAA: Mayra Flood CRNA    Indications and Patient Condition  Indications for airway management: airway protection    Preoxygenated: yes  MILS not maintained throughout  Mask difficulty assessment: 1 - vent by mask    Final Airway Details  Final airway type: endotracheal airway      Successful airway: ETT  Cuffed: yes   Successful intubation technique: direct laryngoscopy  Endotracheal tube insertion site: oral  Blade: Yolanda  Blade size: 3  ETT size (mm): 7.0  Cormack-Lehane Classification: grade I - full view of glottis  Placement verified by: chest auscultation and capnometry   Measured from: lips  ETT/EBT  to lips (cm): 21  Number of attempts at approach: 1  Assessment: lips, teeth, and gum same as pre-op and atraumatic intubation    Additional Comments  Dentition intact and unchanged. CBEBS.  +ETCO2.

## 2022-09-30 NOTE — ANESTHESIA PROCEDURE NOTES
Peripheral Block      Patient location during procedure: pre-op  Stop time: 9/30/2022 8:21 AM  Reason for block: at surgeon's request and post-op pain management  Performed by  Anesthesiologist: Damian Coleman MD  Preanesthetic Checklist  Completed: patient identified, IV checked, site marked, risks and benefits discussed, surgical consent, monitors and equipment checked, pre-op evaluation and timeout performed  Prep:  Pt Position: sitting  Sterile barriers:cap, gloves, sterile barriers and mask  Prep: ChloraPrep  Patient monitoring: blood pressure monitoring, continuous pulse oximetry and EKG  Procedure    Sedation: yes    Guidance:ultrasound guided    ULTRASOUND INTERPRETATION.  Using ultrasound guidance a 21 G gauge needle was placed in close proximity to the femoral nerve, at which point, under ultrasound guidance anesthetic was injected in the area of the nerve and spread of the anesthesia was seen on ultrasound in close proximity thereto.  There were no abnormalities seen on ultrasound; a digital image was taken; and the patient tolerated the procedure with no complications. Images:still images obtained, printed/placed on chart    Laterality:right  Block Type:adductor canal block  Injection Technique:single-shot  Needle Type:echogenic  Needle Gauge:21 G  Resistance on Injection: none    Medications Used: bupivacaine-EPINEPHrine PF (MARCAINE w/EPI) 0.5% -[id number] injection, 20 mL  dexamethasone (DECADRON) injection, 4 mg      Post Assessment  Injection Assessment: negative aspiration for heme, no paresthesia on injection and incremental injection  Patient Tolerance:comfortable throughout block  Complications:no

## 2022-09-30 NOTE — ANESTHESIA PREPROCEDURE EVALUATION
Anesthesia Evaluation     Patient summary reviewed   NPO Solid Status: > 6 hours  NPO Liquid Status: > 2 hours           Airway   Mallampati: I  TM distance: >3 FB  Neck ROM: full  Dental - normal exam     Pulmonary     breath sounds clear to auscultation  (+) a smoker Former,   (-) COPD, asthma, sleep apnea  Cardiovascular   Exercise tolerance: good (4-7 METS)    ECG reviewed  Rhythm: regular  Rate: normal    (+) hyperlipidemia,   (-) CAD, dysrhythmias, angina, cardiac stents      Neuro/Psych  (+) headaches, numbness (cervical radiculopathy),    (-) seizures, CVA, syncope  GI/Hepatic/Renal/Endo    (+)  GERD well controlled,  thyroid problem thyroid nodules  (-) liver disease, no renal disease, diabetes    Musculoskeletal     (+) neck pain,   Abdominal    Substance History      OB/GYN          Other      history of cancer (hx GIST tumor)                    Anesthesia Plan    ASA 2     general with block       Anesthetic plan, risks, benefits, and alternatives have been provided, discussed and informed consent has been obtained with: patient.        CODE STATUS:

## 2022-10-01 ENCOUNTER — HOME CARE VISIT (OUTPATIENT)
Dept: HOME HEALTH SERVICES | Facility: HOME HEALTHCARE | Age: 72
End: 2022-10-01

## 2022-10-01 VITALS
DIASTOLIC BLOOD PRESSURE: 57 MMHG | OXYGEN SATURATION: 96 % | TEMPERATURE: 98 F | HEART RATE: 60 BPM | RESPIRATION RATE: 16 BRPM | SYSTOLIC BLOOD PRESSURE: 112 MMHG

## 2022-10-01 PROCEDURE — G0151 HHCP-SERV OF PT,EA 15 MIN: HCPCS

## 2022-10-01 NOTE — HOME HEALTH
REASON FOR REFERRAL:  decreased ability to ambulate in and out of the home following right TKR.    MEDICAL HISTORY: Patient has had right knee problems for several years which has progressively become worse. Had right TKR yesterday and sent home last night.     SKILLED PHYSICAL THERAPY IS MEDICALLY NECESSARY FOR:  decreased ROM, decreased strength, decreased ambulatory ability, decreased transfers, increased risk of falls, pain/edema

## 2022-10-03 ENCOUNTER — TELEPHONE (OUTPATIENT)
Dept: ORTHOPEDIC SURGERY | Facility: HOSPITAL | Age: 72
End: 2022-10-03

## 2022-10-03 ENCOUNTER — HOME CARE VISIT (OUTPATIENT)
Dept: HOME HEALTH SERVICES | Facility: HOME HEALTHCARE | Age: 72
End: 2022-10-03

## 2022-10-03 VITALS
SYSTOLIC BLOOD PRESSURE: 128 MMHG | HEART RATE: 64 BPM | TEMPERATURE: 97.7 F | RESPIRATION RATE: 16 BRPM | DIASTOLIC BLOOD PRESSURE: 64 MMHG

## 2022-10-03 PROCEDURE — G0151 HHCP-SERV OF PT,EA 15 MIN: HCPCS

## 2022-10-03 NOTE — TELEPHONE ENCOUNTER
Post op day 3  Discharge Instructions:  Ask patient about his or her discharge instructions  ?  Patient confirmed understanding   ?  Further instruction needed   What, if any, recommendations, teaching, or interventions did you provide? Click or tap here to enter text.  Health status:  Pain controlled Yes   Recommended interventions:  Yes  incision/dressing status   ?  Clean without redness, drainage, odor  ?  Redness    ?  Drainage - color Click or tap here to enter text.  ?  Odor  CHRISTOPH - Green light blinking Yes  Difficulties urination No  Last BM 10/3/2022 (if no BM by day 3-recommend OTC suppository or fleets enema)  Medications:  ?Medications reviewed with patient/family/caregiver  Patient taking medications as prescribed?   Choose an item.  If not taking medications as prescribed, note specific medicine(s) and reason for each:  Click or tap here to enter text.  Hospital Follow Up Plan:  Follow up Appointment with Orthopedic surgeon:  ?Has f/u appointment                ?Scheduled f/u appointment  Home Care ordered at discharge?    Yes        Home Care started, or contact made?    Yes   If no, action taken: Click or tap here to enter text.  DME obtained/used in home?         Yes   Using IS  Choose an item.   Other information: Ms. Comer said things seem to be going very well. She can’t complain. PT has been out to see her and they are coming again today. She is up and walking. Dressing remains CDI with green light blinking. BM’s are getting better. Pain is controlled with the medications. Ms. Comer doesn’t have any questions/concerns for me at this time. She has my contact information should she need anything. She voiced understanding and appreciated the call.

## 2022-10-05 ENCOUNTER — HOME CARE VISIT (OUTPATIENT)
Dept: HOME HEALTH SERVICES | Facility: HOME HEALTHCARE | Age: 72
End: 2022-10-05

## 2022-10-05 PROCEDURE — G0151 HHCP-SERV OF PT,EA 15 MIN: HCPCS

## 2022-10-06 VITALS
RESPIRATION RATE: 17 BRPM | SYSTOLIC BLOOD PRESSURE: 128 MMHG | TEMPERATURE: 97.4 F | DIASTOLIC BLOOD PRESSURE: 68 MMHG | HEART RATE: 72 BPM

## 2022-10-07 ENCOUNTER — HOME CARE VISIT (OUTPATIENT)
Dept: HOME HEALTH SERVICES | Facility: HOME HEALTHCARE | Age: 72
End: 2022-10-07

## 2022-10-07 PROCEDURE — G0151 HHCP-SERV OF PT,EA 15 MIN: HCPCS

## 2022-10-10 ENCOUNTER — HOME CARE VISIT (OUTPATIENT)
Dept: HOME HEALTH SERVICES | Facility: HOME HEALTHCARE | Age: 72
End: 2022-10-10

## 2022-10-10 VITALS
TEMPERATURE: 97.8 F | RESPIRATION RATE: 17 BRPM | HEART RATE: 68 BPM | SYSTOLIC BLOOD PRESSURE: 126 MMHG | DIASTOLIC BLOOD PRESSURE: 70 MMHG

## 2022-10-10 PROCEDURE — G0151 HHCP-SERV OF PT,EA 15 MIN: HCPCS

## 2022-10-11 ENCOUNTER — TELEPHONE (OUTPATIENT)
Dept: ORTHOPEDIC SURGERY | Facility: HOSPITAL | Age: 72
End: 2022-10-11

## 2022-10-11 VITALS
RESPIRATION RATE: 17 BRPM | DIASTOLIC BLOOD PRESSURE: 56 MMHG | TEMPERATURE: 97.4 F | HEART RATE: 74 BPM | SYSTOLIC BLOOD PRESSURE: 122 MMHG

## 2022-10-11 NOTE — TELEPHONE ENCOUNTER
Attempted to reach Ms. Comer to see how she is doing as she is 2 weeks SP RTK. Message left at this time.

## 2022-10-12 ENCOUNTER — HOME CARE VISIT (OUTPATIENT)
Dept: HOME HEALTH SERVICES | Facility: HOME HEALTHCARE | Age: 72
End: 2022-10-12

## 2022-10-12 PROCEDURE — G0151 HHCP-SERV OF PT,EA 15 MIN: HCPCS

## 2022-10-13 ENCOUNTER — OFFICE VISIT (OUTPATIENT)
Dept: ORTHOPEDIC SURGERY | Facility: CLINIC | Age: 72
End: 2022-10-13

## 2022-10-13 VITALS — WEIGHT: 150 LBS | HEIGHT: 62 IN | BODY MASS INDEX: 27.6 KG/M2 | TEMPERATURE: 96.9 F

## 2022-10-13 DIAGNOSIS — Z96.651 STATUS POST RIGHT KNEE REPLACEMENT: Primary | ICD-10-CM

## 2022-10-13 PROCEDURE — 99024 POSTOP FOLLOW-UP VISIT: CPT | Performed by: ORTHOPAEDIC SURGERY

## 2022-10-13 NOTE — PROGRESS NOTES
Ashley Comer : 1950 MRN: [id number] DATE: 10/13/2022    DIAGNOSIS: 2 week follow up right total knee      SUBJECTIVE:Patient returns today for 2 week follow up of right total knee replacement. Patient reports doing well with no unusual complaints. Appears to be progressing appropriately.    OBJECTIVE:   Exam:. The incision is healing appropriately. No sign of infection. Range of motion is progressing as expected. The calf is soft and nontender with a negative Homans sign.    ASSESSMENT: 2 week status post right knee replacement.    PLAN: 1) Staples removed and steri strips applied   2) Order given for PT   3) Discontinue ANDREW hose   4) Continue ice PRN   5) aspirin 81 mg orally every day for 1 month   6) Follow up in 6 weeks with repeat Xrays of right knee (3views)    Daniele Mcelroy MD  10/13/2022

## 2022-10-14 VITALS
RESPIRATION RATE: 18 BRPM | TEMPERATURE: 97.5 F | DIASTOLIC BLOOD PRESSURE: 56 MMHG | HEART RATE: 68 BPM | SYSTOLIC BLOOD PRESSURE: 122 MMHG

## 2022-10-17 PROCEDURE — G0180 MD CERTIFICATION HHA PATIENT: HCPCS | Performed by: ORTHOPAEDIC SURGERY

## 2022-11-03 ENCOUNTER — LAB (OUTPATIENT)
Dept: LAB | Facility: HOSPITAL | Age: 72
End: 2022-11-03

## 2022-11-03 DIAGNOSIS — C49.A3 GIST (GASTROINTESTINAL STROMAL TUMOR) OF SMALL BOWEL, MALIGNANT: ICD-10-CM

## 2022-11-03 DIAGNOSIS — D50.0 IRON DEFICIENCY ANEMIA DUE TO CHRONIC BLOOD LOSS: ICD-10-CM

## 2022-11-03 LAB
BASOPHILS # BLD AUTO: 0.07 10*3/MM3 (ref 0–0.2)
BASOPHILS NFR BLD AUTO: 0.9 % (ref 0–1.5)
DEPRECATED RDW RBC AUTO: 42.6 FL (ref 37–54)
EOSINOPHIL # BLD AUTO: 0.07 10*3/MM3 (ref 0–0.4)
EOSINOPHIL NFR BLD AUTO: 0.9 % (ref 0.3–6.2)
ERYTHROCYTE [DISTWIDTH] IN BLOOD BY AUTOMATED COUNT: 12.3 % (ref 12.3–15.4)
FERRITIN SERPL-MCNC: 126.5 NG/ML (ref 13–150)
HCT VFR BLD AUTO: 40.1 % (ref 34–46.6)
HGB BLD-MCNC: 12.9 G/DL (ref 12–15.9)
IMM GRANULOCYTES # BLD AUTO: 0.02 10*3/MM3 (ref 0–0.05)
IMM GRANULOCYTES NFR BLD AUTO: 0.3 % (ref 0–0.5)
IRON 24H UR-MRATE: 72 MCG/DL (ref 37–145)
IRON SATN MFR SERPL: 20 % (ref 14–48)
LYMPHOCYTES # BLD AUTO: 1.46 10*3/MM3 (ref 0.7–3.1)
LYMPHOCYTES NFR BLD AUTO: 19.4 % (ref 19.6–45.3)
MCH RBC QN AUTO: 30.5 PG (ref 26.6–33)
MCHC RBC AUTO-ENTMCNC: 32.2 G/DL (ref 31.5–35.7)
MCV RBC AUTO: 94.8 FL (ref 79–97)
MONOCYTES # BLD AUTO: 0.53 10*3/MM3 (ref 0.1–0.9)
MONOCYTES NFR BLD AUTO: 7 % (ref 5–12)
NEUTROPHILS NFR BLD AUTO: 5.38 10*3/MM3 (ref 1.7–7)
NEUTROPHILS NFR BLD AUTO: 71.5 % (ref 42.7–76)
NRBC BLD AUTO-RTO: 0 /100 WBC (ref 0–0.2)
PLATELET # BLD AUTO: 308 10*3/MM3 (ref 140–450)
PMV BLD AUTO: 8.4 FL (ref 6–12)
RBC # BLD AUTO: 4.23 10*6/MM3 (ref 3.77–5.28)
TIBC SERPL-MCNC: 356 MCG/DL (ref 249–505)
TRANSFERRIN SERPL-MCNC: 254 MG/DL (ref 200–360)
WBC NRBC COR # BLD: 7.53 10*3/MM3 (ref 3.4–10.8)

## 2022-11-03 PROCEDURE — 82728 ASSAY OF FERRITIN: CPT

## 2022-11-03 PROCEDURE — 83540 ASSAY OF IRON: CPT

## 2022-11-03 PROCEDURE — 36415 COLL VENOUS BLD VENIPUNCTURE: CPT

## 2022-11-03 PROCEDURE — 84466 ASSAY OF TRANSFERRIN: CPT

## 2022-11-03 PROCEDURE — 85025 COMPLETE CBC W/AUTO DIFF WBC: CPT

## 2022-11-10 ENCOUNTER — OFFICE VISIT (OUTPATIENT)
Dept: ONCOLOGY | Facility: CLINIC | Age: 72
End: 2022-11-10

## 2022-11-10 ENCOUNTER — APPOINTMENT (OUTPATIENT)
Dept: LAB | Facility: HOSPITAL | Age: 72
End: 2022-11-10

## 2022-11-10 VITALS
OXYGEN SATURATION: 98 % | BODY MASS INDEX: 27.12 KG/M2 | RESPIRATION RATE: 18 BRPM | WEIGHT: 147.4 LBS | TEMPERATURE: 97.3 F | HEART RATE: 70 BPM | SYSTOLIC BLOOD PRESSURE: 132 MMHG | HEIGHT: 62 IN | DIASTOLIC BLOOD PRESSURE: 79 MMHG

## 2022-11-10 DIAGNOSIS — D50.0 IRON DEFICIENCY ANEMIA DUE TO CHRONIC BLOOD LOSS: ICD-10-CM

## 2022-11-10 DIAGNOSIS — C49.A3 GIST (GASTROINTESTINAL STROMAL TUMOR) OF SMALL BOWEL, MALIGNANT: Primary | ICD-10-CM

## 2022-11-10 PROCEDURE — 99213 OFFICE O/P EST LOW 20 MIN: CPT | Performed by: INTERNAL MEDICINE

## 2022-11-10 RX ORDER — PEDI MV NO.227/FERROUS SULFATE 10 MG
TABLET,CHEWABLE ORAL
COMMUNITY

## 2022-11-10 RX ORDER — MELATONIN: COMMUNITY

## 2022-11-10 NOTE — PROGRESS NOTES
Subjective   REASON FOR FOLLOW UP:   1.  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  2.  Iron deficiency anemia due to gastritis. Corrected with oral iron supplementation    HISTORY OF PRESENT ILLNESS:     History of Present Illness     Mrs. Comer is a 72 y.o. female here today for annual follow-up visit to our office.  She had previously been followed in our office for resected GIST and had completed 5 years of surveillance with a CT scan in January 2021.  We were planning to see her on an as-needed basis after she completed her 5 years of surveillance.  We did not schedule routine appointment but she called and scheduled another appointment due to a new problem.    She was noted to be anemic on labs from June 2021 with a ferritin level of 5.  She started on over-the-counter Ewing vitamins with iron and has been taking 1 a day.  She also underwent upper and lower GI endoscopy with her surgeon Dr. Mini Meraz.  The EGD did show gastritis most likely related to her use of nonsteroidal anti-inflammatory drugs.    She has stopped taking Naprosyn and has continued taking omeprazole daily.     She continues to take Ewing vitamins with iron.  She had her labs performed on 11/3/2022 which showed that she is still in good shape in regards to her iron stores.  Her iron saturation was 20% with a serum ferritin of 126.  Her hemoglobin was normal at 12.9 g/dL.    She reports that since her last visit here she did have a right total knee replacement but seems to be healing and rehabbing well      Past Oncologic History  She had been seen in consultation during her admission to [facility] from 2/20/2016 to 2/26/2016.  She was admitted at that time with symptomatic anemia and GI bleeding.  On endoscopy the blood appeared to be coming from the region of the appendix and she underwent a laparoscopic appendectomy with Dr. Meraz on 2/22/2016.  Also at the same procedure she was found to have a tumor within  "the small bowel and had a partial small bowel resection with reanastomosis.  The pathology from that specimen showed a gastrointestinal stromal tumor (GIST) .  The tumor was 4.5 cm and appeared to be low-grade with 0 mitotic figures per high-powered field.    We discussed with Miss Comer at that time that we would not recommend any postop adjuvant Gleevec therapy as her prognosis was very good with surgery alone.    Past Medical History, Past Surgical History, Social History, Family History have been reviewed and are without significant changes except as mentioned.    Review of Systems   Constitutional: Negative for activity change, appetite change, fatigue, fever and unexpected weight change.   HENT: Negative for hearing loss, nosebleeds, trouble swallowing and voice change.    Eyes: Negative for visual disturbance.   Respiratory: Negative for cough, shortness of breath and wheezing.    Cardiovascular: Negative for chest pain and palpitations.   Gastrointestinal: Negative for abdominal pain, diarrhea, nausea and vomiting.   Genitourinary: Negative for difficulty urinating, frequency, hematuria and urgency.   Musculoskeletal: Negative for back pain and neck pain.   Skin: Negative for rash.   Neurological: Negative for dizziness, seizures, syncope and headaches.   Hematological: Negative for adenopathy. Does not bruise/bleed easily.   Psychiatric/Behavioral: Negative for behavioral problems. The patient is not nervous/anxious.       A comprehensive 14 point review of systems was performed and was negative except as mentioned.    Medications:  The current medication list was reviewed in the EMR    ALLERGIES:    Allergies   Allergen Reactions   • Penicillins Rash     Rash over whole body       Objective      Vitals:    11/10/22 1406   Resp: 18   Temp: 97.3 °F (36.3 °C)   TempSrc: Temporal   Weight: 66.9 kg (147 lb 6.4 oz)   Height: 157.5 cm (62.01\")   PainSc: 0-No pain     Current Status 4/28/2022   ECOG score 0 "       Physical Exam   Constitutional: She is oriented to person, place, and time. She appears well-developed. No distress.   HENT:   Head: Normocephalic.   Eyes: Pupils are equal, round, and reactive to light. Conjunctivae are normal. No scleral icterus.   Neck: No JVD present. No thyromegaly present.   Cardiovascular: Normal rate and regular rhythm. Exam reveals no gallop and no friction rub.   No murmur heard.  Pulmonary/Chest: Effort normal and breath sounds normal. She has no wheezes. She has no rales.   Abdominal: Soft. She exhibits no distension and no mass. There is no abdominal tenderness.   Musculoskeletal: Normal range of motion. No deformity.   Lymphadenopathy:     She has no cervical adenopathy.   Neurological: She is alert and oriented to person, place, and time. She has normal reflexes. No cranial nerve deficit.   Skin: Skin is warm and dry. No rash noted. No erythema.   Psychiatric: Her behavior is normal. Judgment normal.      I have reexamined the patient and the results are consistent with the previously documented exam. Vargas Mejias MD     RECENT LABS:  Hematology WBC   Date Value Ref Range Status   11/03/2022 7.53 3.40 - 10.80 10*3/mm3 Final   06/14/2021 7.63 3.40 - 10.80 10*3/mm3 Final     RBC   Date Value Ref Range Status   11/03/2022 4.23 3.77 - 5.28 10*6/mm3 Final   06/14/2021 4.47 3.77 - 5.28 10*6/mm3 Final     Hemoglobin   Date Value Ref Range Status   11/03/2022 12.9 12.0 - 15.9 g/dL Final     Hematocrit   Date Value Ref Range Status   11/03/2022 40.1 34.0 - 46.6 % Final     Platelets   Date Value Ref Range Status   11/03/2022 308 140 - 450 10*3/mm3 Final              Lab Results   Component Value Date    IRON 72 11/03/2022    TIBC 356 11/03/2022    FERRITIN 126.50 11/03/2022   SAT 20%    Lab Results   Component Value Date    GLUCOSE 86 09/22/2022    BUN 18 09/22/2022    CREATININE 0.55 (L) 09/22/2022    EGFRIFNONA 82 08/12/2021    EGFRIFAFRI 119 06/14/2021    BCR 32.7 (H) 09/22/2022     CO2 22.8 09/22/2022    CALCIUM 9.7 09/22/2022    PROTENTOTREF 6.8 06/14/2021    ALBUMIN 4.80 04/06/2022    LABIL2 2.2 06/14/2021    AST 25 04/06/2022    ALT 25 04/06/2022     EGD PATHOLOGY 7/30/2021  Final Diagnosis   1. Antrum, Biopsy: Benign gastric mucosa with                A. Mild chronic inflammation.               B. Features suggestive of chemical gastritis.   C. No intestinal metaplasia.               D. No Helicobacter pylori.      2. Gastroesophageal Junction, Biopsy: Benign squamous and gastric mucosa with                A. Chronic inflammation of the gastric mucosa.               B. No intestinal metaplasia.               C. No Helicobacter pylori.       IMAGING:  CT OF THE CHEST ABDOMEN AND PELVIS WITH CONTRAST 1/25/2021   CONCLUSION: Stable CT scan of the chest, no acute intrathoracic  abnormality or suspicious pulmonary lesion has developed.  CONCLUSION: Stable CT scan of the abdomen and pelvis, specifically no  change in the small bowel anastomosis. No indication of local tumor  recurrence nor visceral metastasis.      Assessment & Plan      1.  Gastrointestinal stromal tumor of the small bowel resected 2/22/2016 by Dr. Meraz with good margins.  The tumor appeared to be low-grade with good prognostic features.  Patient continues to do well now over 6 years out from surgery.  2.  Iron deficiency anemia due to GI blood loss.  As noted above, her EGD showed gastritis.  She had been taking nonsteroidals and also had been donating blood.  She since has stopped taking Naprosyn and we instructed her to stop donating blood also.  She is tolerating oral iron supplementation in the form of Flintstones vitamins. As noted above, her iron studies from last week show that she is still replete with iron.    Plan    1.   We reviewed the results of the labs from 11/3/2022 with the patient and explained that she is still replete with iron.  2.   We encouraged her to continue to avoid all nonsteroidal  anti-inflammatory drugs.  3.  We instructed her to continue the [place] vitamins with iron 1  tablet daily  3.  MD follow-up with labs in 12 months                    11/10/2022      CC:

## 2022-11-29 ENCOUNTER — OFFICE VISIT (OUTPATIENT)
Dept: ORTHOPEDIC SURGERY | Facility: CLINIC | Age: 72
End: 2022-11-29

## 2022-11-29 VITALS — HEIGHT: 62 IN | BODY MASS INDEX: 27.05 KG/M2 | TEMPERATURE: 97.5 F | WEIGHT: 147 LBS | RESPIRATION RATE: 12 BRPM

## 2022-11-29 DIAGNOSIS — Z96.651 STATUS POST RIGHT KNEE REPLACEMENT: ICD-10-CM

## 2022-11-29 DIAGNOSIS — R52 PAIN: Primary | ICD-10-CM

## 2022-11-29 PROCEDURE — 99024 POSTOP FOLLOW-UP VISIT: CPT | Performed by: NURSE PRACTITIONER

## 2022-11-29 PROCEDURE — 73562 X-RAY EXAM OF KNEE 3: CPT | Performed by: NURSE PRACTITIONER

## 2022-11-29 RX ORDER — CLINDAMYCIN HYDROCHLORIDE 300 MG/1
CAPSULE ORAL
Qty: 2 CAPSULE | Refills: 5 | Status: SHIPPED | OUTPATIENT
Start: 2022-11-29

## 2022-11-29 NOTE — PROGRESS NOTES
Ashley Comer : 1950 MRN: [id number] DATE: 2022    DIAGNOSIS: 8 week follow up right total knee      SUBJECTIVE:Patient returns today for 8 week follow up of a right total knee replacement. Patient reports doing well with no unusual complaints. Appears to be progressing appropriately.  Patient reports that her pain level is very tolerable at this time.  She is still going to physical therapy at [facility] and progressing well with PT.  She denies any specific limitations or any symptoms of infection.  And she is without any other significant complaints today    OBJECTIVE:   Exam:. The incision is well healed. No sign of infection. Range of motion is measured at 0 to 120. The calf is soft and nontender with a negative Homans sign. Strength is progressing and the patient is ambulating appropriately.    DIAGNOSTIC STUDIES  Xrays: 3 views of the right knee (AP, lateral, and sunrise) were ordered and reviewed for evaluation of recent knee replacement. They demonstrate a well positioned, well aligned knee replacement without complicating factors noted. In comparison with previous films there has been no change.    ASSESSMENT: 8 week status post right knee replacement.    PLAN: 1) Continue with PT exercises as prescribed   2) Follow up in 10 months for annual visit    CARMELA Mahajan  2022

## 2022-12-15 ENCOUNTER — OFFICE VISIT (OUTPATIENT)
Dept: FAMILY MEDICINE CLINIC | Facility: CLINIC | Age: 72
End: 2022-12-15
Payer: MEDICARE

## 2022-12-15 VITALS
SYSTOLIC BLOOD PRESSURE: 132 MMHG | WEIGHT: 149 LBS | DIASTOLIC BLOOD PRESSURE: 82 MMHG | OXYGEN SATURATION: 99 % | HEART RATE: 67 BPM | HEIGHT: 62 IN | BODY MASS INDEX: 27.42 KG/M2 | TEMPERATURE: 97.7 F

## 2022-12-15 DIAGNOSIS — K62.5 RECTAL BLEEDING: Primary | ICD-10-CM

## 2022-12-15 PROCEDURE — 1160F RVW MEDS BY RX/DR IN RCRD: CPT | Performed by: NURSE PRACTITIONER

## 2022-12-15 PROCEDURE — 1159F MED LIST DOCD IN RCRD: CPT | Performed by: NURSE PRACTITIONER

## 2022-12-15 PROCEDURE — 99213 OFFICE O/P EST LOW 20 MIN: CPT | Performed by: NURSE PRACTITIONER

## 2022-12-15 NOTE — PROGRESS NOTES
Chief Complaint  Heartburn (F/u GERD)    Subjective        Ashley Comer presents to [facility]  History of Present Illness patient is here for 6-month follow-up on GERD.  She also has new bright red bleeding x36 hours from rectum.  Feels like it is likely due to hemorrhoid.  But with previous history of bowel cancer is obviously concerned.  She has no melena.  She denies rectal pain or changes in stool.  She has been taking Flintstones with iron daily.    Feels like she has been recovering well from recent knee surgery.  Getting back to normal ADLs, exercise.    Objective   Vital Signs:  /82   Pulse 67   Temp 97.7 °F (36.5 °C)   Ht 157.5 cm (62\")   Wt 67.6 kg (149 lb)   SpO2 99%   BMI 27.25 kg/m²   Estimated body mass index is 27.25 kg/m² as calculated from the following:    Height as of this encounter: 157.5 cm (62\").    Weight as of this encounter: 67.6 kg (149 lb).    BMI is >= 25 and <30. (Overweight) The following options were offered after discussion;: weight loss educational material (shared in after visit summary)      Physical Exam  Vitals and nursing note reviewed.   Constitutional:       General: She is not in acute distress.     Appearance: She is well-developed. She is not ill-appearing or diaphoretic.   HENT:      Head: Normocephalic and atraumatic.   Eyes:      General:         Right eye: No discharge.         Left eye: No discharge.      Conjunctiva/sclera: Conjunctivae normal.   Cardiovascular:      Rate and Rhythm: Normal rate and regular rhythm.      Heart sounds: Normal heart sounds.   Pulmonary:      Effort: Pulmonary effort is normal.      Breath sounds: Normal breath sounds.   Abdominal:      General: Bowel sounds are normal.      Palpations: Abdomen is soft.      Tenderness: There is no abdominal tenderness.   Genitourinary:     Comments: External nonthrombosed hemorrhoid noted, not bleeding.  Musculoskeletal:         General: No deformity.    Skin:     General: Skin is warm and dry.   Neurological:      General: No focal deficit present.      Mental Status: She is alert and oriented to person, place, and time.   Psychiatric:         Mood and Affect: Mood normal.         Behavior: Behavior normal.        Result Review :                Assessment and Plan   Diagnoses and all orders for this visit:    1. Rectal bleeding (Primary)    Likely due to hemorrhoid.  We discussed hemorrhoidal management and prevention.  May have been aggravated with recent knee replacement and iron replacement.  If this does not stop or she notices sporadic bleeding will need further evaluation and will let me know.  She had recent visit with heme-onc and is approximately 6 years out from surgical resection of GI stromal tumor.  She continues to do well.  She does have chronic iron deficiency anemia and is to continue Flintstones with iron, avoid NSAIDs and continue PPI.         Follow Up   No follow-ups on file.  Patient was given instructions and counseling regarding her condition or for health maintenance advice. Please see specific information pulled into the AVS if appropriate.

## 2023-01-10 ENCOUNTER — TELEPHONE (OUTPATIENT)
Dept: ORTHOPEDIC SURGERY | Facility: CLINIC | Age: 73
End: 2023-01-10

## 2023-01-10 NOTE — TELEPHONE ENCOUNTER
Caller: HARPREET BLACKWELL    Relationship to patient: SELF    Best call back number: [phone]    Chief complaint: INJECTION LEFT KNEE    Type of visit: INJECTION LEFT KNEE    Requested date: WEEK OF 02/13/2023    If rescheduling, when is the original appointment: NA     Additional notes: NA

## 2023-02-16 ENCOUNTER — CLINICAL SUPPORT (OUTPATIENT)
Dept: ORTHOPEDIC SURGERY | Facility: CLINIC | Age: 73
End: 2023-02-16
Payer: MEDICARE

## 2023-02-16 VITALS — WEIGHT: 149 LBS | TEMPERATURE: 97.3 F | HEIGHT: 62 IN | BODY MASS INDEX: 27.42 KG/M2 | RESPIRATION RATE: 16 BRPM

## 2023-02-16 DIAGNOSIS — M17.12 PRIMARY OSTEOARTHRITIS OF LEFT KNEE: ICD-10-CM

## 2023-02-16 DIAGNOSIS — R52 PAIN: Primary | ICD-10-CM

## 2023-02-16 PROCEDURE — 73562 X-RAY EXAM OF KNEE 3: CPT | Performed by: NURSE PRACTITIONER

## 2023-02-16 PROCEDURE — 20610 DRAIN/INJ JOINT/BURSA W/O US: CPT | Performed by: NURSE PRACTITIONER

## 2023-02-16 RX ORDER — LIDOCAINE HYDROCHLORIDE 10 MG/ML
5 INJECTION, SOLUTION EPIDURAL; INFILTRATION; INTRACAUDAL; PERINEURAL
Status: COMPLETED | OUTPATIENT
Start: 2023-02-16 | End: 2023-02-16

## 2023-02-16 RX ORDER — METHYLPREDNISOLONE ACETATE 80 MG/ML
80 INJECTION, SUSPENSION INTRA-ARTICULAR; INTRALESIONAL; INTRAMUSCULAR; SOFT TISSUE
Status: COMPLETED | OUTPATIENT
Start: 2023-02-16 | End: 2023-02-16

## 2023-02-16 RX ADMIN — LIDOCAINE HYDROCHLORIDE 5 ML: 10 INJECTION, SOLUTION EPIDURAL; INFILTRATION; INTRACAUDAL; PERINEURAL at 16:39

## 2023-02-16 RX ADMIN — METHYLPREDNISOLONE ACETATE 80 MG: 80 INJECTION, SUSPENSION INTRA-ARTICULAR; INTRALESIONAL; INTRAMUSCULAR; SOFT TISSUE at 16:39

## 2023-02-16 NOTE — PROGRESS NOTES
2/16/2023    Ashley Comer is here today for worsening knee pain. Pt has undergone injection of the knee in the past with good resolution of symptoms. Pt is requesting a repeat injection.     KNEE Injection Procedure Note:    Large Joint Arthrocentesis: L knee  Date/Time: 2/16/2023 4:39 PM  Consent given by: patient  Site marked: site marked  Timeout: Immediately prior to procedure a time out was called to verify the correct patient, procedure, equipment, support staff and site/side marked as required   Supporting Documentation  Indications: pain and joint swelling   Procedure Details  Location: knee - L knee  Preparation: Patient was prepped and draped in the usual sterile fashion  Needle size: 22 G  Approach: anterolateral  Medications administered: 80 mg methylPREDNISolone acetate 80 MG/ML; 5 mL lidocaine PF 1% 1 %  Patient tolerance: patient tolerated the procedure well with no immediate complications        3 mL of lidocaine was used for anesthetic purposes.    Images: X-rays 3 views left knee were ordered and reviewed for evaluation of left knee pain.  Images show patient has advanced patellofemoral osteoarthritis with bone-on-bone articulation, cystic changes as well as periarticular osteophytes present.  Medial and lateral compartment appear to be preserved.  In comparison to previous x-rays no significant changes noted.    At the conclusion of the injection I discussed the importance of continued quad strengthening exercises on a daily basis. I will see the patient back if the symptoms should fail to improve or worsen.    Xavi Delgado, APRN  2/16/2023

## 2023-03-09 DIAGNOSIS — K21.9 GASTROESOPHAGEAL REFLUX DISEASE WITHOUT ESOPHAGITIS: ICD-10-CM

## 2023-03-10 RX ORDER — OMEPRAZOLE 40 MG/1
40 CAPSULE, DELAYED RELEASE ORAL DAILY
Qty: 90 CAPSULE | Refills: 1 | Status: SHIPPED | OUTPATIENT
Start: 2023-03-10

## 2023-05-11 ENCOUNTER — OFFICE VISIT (OUTPATIENT)
Dept: FAMILY MEDICINE CLINIC | Facility: CLINIC | Age: 73
End: 2023-05-11
Payer: MEDICARE

## 2023-05-11 VITALS
OXYGEN SATURATION: 98 % | BODY MASS INDEX: 28.69 KG/M2 | DIASTOLIC BLOOD PRESSURE: 80 MMHG | WEIGHT: 155.9 LBS | HEART RATE: 67 BPM | TEMPERATURE: 96.9 F | SYSTOLIC BLOOD PRESSURE: 141 MMHG | HEIGHT: 62 IN

## 2023-05-11 DIAGNOSIS — M25.472 LEFT ANKLE SWELLING: ICD-10-CM

## 2023-05-11 DIAGNOSIS — K21.9 GASTROESOPHAGEAL REFLUX DISEASE WITHOUT ESOPHAGITIS: ICD-10-CM

## 2023-05-11 DIAGNOSIS — R00.2 PALPITATIONS: Primary | ICD-10-CM

## 2023-05-11 DIAGNOSIS — E78.2 MIXED HYPERLIPIDEMIA: ICD-10-CM

## 2023-05-11 DIAGNOSIS — K21.9 GASTROESOPHAGEAL REFLUX DISEASE WITHOUT ESOPHAGITIS: Primary | ICD-10-CM

## 2023-05-11 DIAGNOSIS — D50.0 IRON DEFICIENCY ANEMIA DUE TO CHRONIC BLOOD LOSS: ICD-10-CM

## 2023-05-11 DIAGNOSIS — E04.2 MULTIPLE THYROID NODULES: ICD-10-CM

## 2023-05-11 RX ORDER — PANTOPRAZOLE SODIUM 40 MG/1
40 TABLET, DELAYED RELEASE ORAL DAILY
Qty: 90 TABLET | Refills: 0 | Status: SHIPPED | OUTPATIENT
Start: 2023-05-11

## 2023-05-11 RX ORDER — HYDROCHLOROTHIAZIDE 12.5 MG/1
12.5 TABLET ORAL DAILY
Qty: 30 TABLET | Refills: 1 | Status: SHIPPED | OUTPATIENT
Start: 2023-05-11

## 2023-05-11 RX ORDER — MULTIPLE VITAMINS W/ MINERALS TAB 9MG-400MCG
TAB ORAL
COMMUNITY
Start: 2023-04-03

## 2023-05-11 RX ORDER — CALCIUM CARBONATE 200(500)MG
TABLET,CHEWABLE ORAL
COMMUNITY
Start: 2023-04-03

## 2023-05-11 NOTE — PROGRESS NOTES
"Chief Complaint  GI Problem (C/o GI problems not getting better, feels fullness in chest, belching a lot more )    Subjective        Ashley Comer presents to [facility]  History of Present Illness patient scheduled a visit for several weeks of feeling more pressure in her mid chest substernal, belching more and some dysphagia that has been going on for several weeks.      She is on a PPI which she takes daily.  If she misses more than 48 hours she becomes symptomatic.  She has had more belching some postprandial satiety but with hunger feelings at the same time.  Not taking any NSAIDs.    Denies melena.  No nausea or vomiting.    She has had a couple episodes over the last 2 weeks of her heart rate above 100.  Seem to occur both times around noonish for no reason.  Patient checked her Fitbit due to heart palpitations this morning.  She was not symptomatic of tachycardia.  Denies chest pain, dyspnea, cough.  Good tolerance for activity.  Has not been in as good shape as is her norm since knee surgery.    Has been on Boniva x2 years      Objective   Vital Signs:  /80   Pulse 67   Temp 96.9 °F (36.1 °C)   Ht 157.5 cm (62\")   Wt 70.7 kg (155 lb 14.4 oz)   SpO2 98%   BMI 28.51 kg/m²   Estimated body mass index is 28.51 kg/m² as calculated from the following:    Height as of this encounter: 157.5 cm (62\").    Weight as of this encounter: 70.7 kg (155 lb 14.4 oz).       BMI is >= 25 and <30. (Overweight) The following options were offered after discussion;: weight loss educational material (shared in after visit summary)      Physical Exam  Vitals and nursing note reviewed.   Constitutional:       General: She is not in acute distress.     Appearance: She is well-developed. She is not ill-appearing or diaphoretic.   HENT:      Head: Normocephalic and atraumatic.   Eyes:      General:         Right eye: No discharge.         Left eye: No discharge.      Conjunctiva/sclera: " Conjunctivae normal.   Cardiovascular:      Rate and Rhythm: Normal rate and regular rhythm.      Heart sounds: Normal heart sounds. No murmur heard.  Pulmonary:      Effort: Pulmonary effort is normal.      Breath sounds: Normal breath sounds.   Abdominal:      General: Bowel sounds are normal. There is no distension.      Palpations: Abdomen is soft. There is no mass.      Tenderness: There is no abdominal tenderness. There is no guarding.   Musculoskeletal:         General: No deformity.      Comments: Gait smooth and steady   Skin:     General: Skin is warm and dry.   Neurological:      General: No focal deficit present.      Mental Status: She is alert and oriented to person, place, and time.   Psychiatric:         Mood and Affect: Mood normal.         Behavior: Behavior normal.        Result Review :              ECG 12 Lead    Date/Time: 5/11/2023 11:05 AM  Performed by: Fannie Aguirre APRN  Authorized by: Fannie Aguirre APRN   Comparison: compared with previous ECG from 6/23/2020  Similar to previous ECG  Rhythm: sinus bradycardia and sinus arrhythmia  Rate: bradycardic  BPM: 58  Conduction: conduction normal  ST Segments: ST segments normal  T Waves: T waves normal  QRS axis: left  Other: no other findings    Clinical impression: non-specific ECG              Assessment and Plan   Diagnoses and all orders for this visit:    1. Palpitations (Primary)  -     Adult Transthoracic Echo Complete W/ Cont if Necessary Per Protocol; Future  -     Holter Monitor - 72 Hour Up To 15 Days; Future  -     ECG 12 Lead    2. Gastroesophageal reflux disease without esophagitis  -     pantoprazole (Protonix) 40 MG EC tablet; Take 1 tablet by mouth Daily.  Dispense: 90 tablet; Refill: 0  -     Ambulatory Referral to Gastroenterology    3. Left ankle swelling  -     hydroCHLOROthiazide (HYDRODIURIL) 12.5 MG tablet; Take 1 tablet by mouth Daily.  Dispense: 30 tablet; Refill: 1    Palpitations: EKG done in office similar  to previous.  We will get echo and a Holter monitor for further monitoring.  Patient agreeable with this plan.  She saw cardiology couple years ago for similar episodes with negative work-up.  We will get a TSH with next labs to be complete.    GERD: We will increase to pantoprazole 40 mg daily.  I have referred her to gastro.  May need swallow study.  She has traveled out of the country on a cruise in the last year.  On a PPI so no H. pylori test done.    Blood pressure has been creeping up just a little bit with weight gain after knee surgery.  Patient is working to lose weight.  She has noticed some bilateral ankle edema left more than right.  Will start low-dose diuretic as needed.  Side effects reviewed.     Is diligent with her follow-ups with heme-onc for history of resected GIST.      Of note she has had previous iron deficiency anemia due to gastritis.    If any worsening in any of her symptoms she will seek emergent evaluation.         Follow Up   No follow-ups on file.  Patient was given instructions and counseling regarding her condition or for health maintenance advice. Please see specific information pulled into the AVS if appropriate.       Answers for HPI/ROS submitted by the patient on 5/11/2023  Please describe your symptoms.: GI symptoms-belching, feeling of fullness, coughing, occasional choking on liquids, pedal edema  Have you had these symptoms before?: No  How long have you been having these symptoms?: 1-2 weeks  What is the primary reason for your visit?: Other

## 2023-05-19 ENCOUNTER — PATIENT ROUNDING (BHMG ONLY) (OUTPATIENT)
Dept: GASTROENTEROLOGY | Facility: CLINIC | Age: 73
End: 2023-05-19

## 2023-05-22 ENCOUNTER — OFFICE VISIT (OUTPATIENT)
Dept: GASTROENTEROLOGY | Facility: CLINIC | Age: 73
End: 2023-05-22
Payer: MEDICARE

## 2023-05-22 ENCOUNTER — PREP FOR SURGERY (OUTPATIENT)
Dept: SURGERY | Facility: SURGERY CENTER | Age: 73
End: 2023-05-22
Payer: MEDICARE

## 2023-05-22 VITALS
SYSTOLIC BLOOD PRESSURE: 100 MMHG | TEMPERATURE: 97.3 F | DIASTOLIC BLOOD PRESSURE: 60 MMHG | BODY MASS INDEX: 28.26 KG/M2 | HEART RATE: 81 BPM | HEIGHT: 62 IN | WEIGHT: 153.6 LBS | OXYGEN SATURATION: 95 %

## 2023-05-22 DIAGNOSIS — K22.4 ESOPHAGEAL SPASM: ICD-10-CM

## 2023-05-22 DIAGNOSIS — K21.9 GERD (GASTROESOPHAGEAL REFLUX DISEASE): Primary | ICD-10-CM

## 2023-05-22 DIAGNOSIS — R07.89 ATYPICAL CHEST PAIN: ICD-10-CM

## 2023-05-22 DIAGNOSIS — Z86.010 HISTORY OF COLON POLYPS: ICD-10-CM

## 2023-05-22 DIAGNOSIS — Z12.11 SCREENING FOR MALIGNANT NEOPLASM OF COLON: ICD-10-CM

## 2023-05-22 DIAGNOSIS — K21.9 GASTROESOPHAGEAL REFLUX DISEASE WITHOUT ESOPHAGITIS: Primary | ICD-10-CM

## 2023-05-22 DIAGNOSIS — Z85.09 HISTORY OF GASTROINTESTINAL STROMAL TUMOR (GIST): ICD-10-CM

## 2023-05-22 RX ORDER — SODIUM CHLORIDE 0.9 % (FLUSH) 0.9 %
10 SYRINGE (ML) INJECTION AS NEEDED
OUTPATIENT
Start: 2023-05-22

## 2023-05-22 RX ORDER — SODIUM CHLORIDE 0.9 % (FLUSH) 0.9 %
3 SYRINGE (ML) INJECTION EVERY 12 HOURS SCHEDULED
OUTPATIENT
Start: 2023-05-22

## 2023-05-22 RX ORDER — SODIUM CHLORIDE, SODIUM LACTATE, POTASSIUM CHLORIDE, CALCIUM CHLORIDE 600; 310; 30; 20 MG/100ML; MG/100ML; MG/100ML; MG/100ML
30 INJECTION, SOLUTION INTRAVENOUS CONTINUOUS PRN
OUTPATIENT
Start: 2023-05-22

## 2023-05-22 NOTE — PROGRESS NOTES
"Chief Complaint   Patient presents with    Heartburn           History of Present Illness  72-year-old female presents today for acid reflux.  She is a new patient with our practice.  She has longstanding acid reflux, she has noticed a change in symptoms 4 to 5 weeks ago.  She has had increase in belching, early satiety, mid chest pressure.  She reports longstanding reflux for over 25 years.  She has had previous EGD August 2020.    She has been on PPI 20 mg daily however this was increased May 11, 2023 by her primary care provider for worsening dyspeptic symptoms and acid reflux.  She has had 50% improvement with higher dose of PPI therapy.    She denies esophageal dysphagia.    Was diagnosed January 2022 with osteoporosis, now on Boniva.     Previous EGD August 2020 for dysphagia with grade a esophagitis biopsies consistent with reflux esophagitis, neck gland gastric polyps, moderate gastritis consistent with chronic reflux, no intestinal metaplasia.    She has a history of GIST status post laparoscopic appendectomy and partial small bowel resection with reanastomosis February 22, 2016 with Dr. Meraz.  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  Her prognosis was good with surgery alone and adjunctive Gleevec therapy was not recommended.  Review of Systems      Result Review :       SCANNED - COLONOSCOPY (08/31/2020)   ENDOSCOPY, INT (08/31/2020)   Tissue Pathology Exam (07/30/2021 11:54)           Vital Signs:   /60   Pulse 81   Temp 97.3 °F (36.3 °C)   Ht 157.5 cm (62.01\")   Wt 69.7 kg (153 lb 9.6 oz)   SpO2 95%   BMI 28.09 kg/m²     Body mass index is 28.09 kg/m².     Physical Exam  Vitals reviewed.   Constitutional:       General: She is not in acute distress.     Appearance: Normal appearance. She is not ill-appearing.   Abdominal:      General: Bowel sounds are normal. There is no distension.      Palpations: Abdomen is soft. Abdomen is not " rigid. There is no mass or pulsatile mass.      Tenderness: There is no abdominal tenderness. There is no guarding or rebound.   Neurological:      Mental Status: She is alert.         Assessment and Plan    Diagnoses and all orders for this visit:    1. Gastroesophageal reflux disease without esophagitis (Primary)    2. Esophageal spasm    3. Atypical chest pain    4. Screening for malignant neoplasm of colon    5. History of colon polyps    6. History of gastrointestinal stromal tumor (GIST)    Reviewed previous EGD, history of esophagitis and gastritis, worsening reflux symptoms.  Continue higher dose of pantoprazole 40 mg once daily.    Acid reflux precautions discussed in detail including smaller more frequent meals, avoiding foods that worsen symptoms and avoiding eating 2 to 3 hours prior to bedtime.    Lung cancer screening is up-to-date, due for colonoscopy at 5-year interval, July 2026.    Follow-up visit after EGD to review results and make additional recommendations.    She has a history of resected GIST was followed by hematology oncology Dr. Mejias, she has completed 5 years of surveillance, CT scan January 2021, patient to follow-up on an as needed basis as she has completed 5 years of surveillance.          Patient Instructions   For acid reflux, our goal is to use lowest dose of acid medication possible to control symptoms in addition to dietary and lifestyle modifications for acid reflux.     For GERD, follow antireflux precautions.  Recommend avoiding eating within 3 to 4 hours of bedtime.  Avoid foods that can trigger symptoms which may include citrus fruits, spicy foods, tomatoes and red sauces, chocolate, coffee/tea, caffeinated or carbonated beverages, alcohol.     Schedule EGD for further evaluation, orders placed.    Additional recommendations will be made based on EGD findings.     Colonoscopy due 5 year interval 7/2026        EMR Dragon/Transcription Disclaimer:  This document has been  Dictated utilizing Dragon dictation.

## 2023-05-22 NOTE — PATIENT INSTRUCTIONS
For acid reflux, our goal is to use lowest dose of acid medication possible to control symptoms in addition to dietary and lifestyle modifications for acid reflux.     For GERD, follow antireflux precautions.  Recommend avoiding eating within 3 to 4 hours of bedtime.  Avoid foods that can trigger symptoms which may include citrus fruits, spicy foods, tomatoes and red sauces, chocolate, coffee/tea, caffeinated or carbonated beverages, alcohol.     Schedule EGD for further evaluation, orders placed.    Additional recommendations will be made based on EGD findings.     Colonoscopy due 5 year interval 7/2026

## 2023-05-31 NOTE — PROGRESS NOTES
[facility]  [address] [address] [phone].    Before we get started may I verify your date of birth? 1950    I am calling to officially welcome you to our practice and ask about your recent visit. Is this a good time to talk? yes    Tell me about your visit with us. What things went well?  Great office! Wonderful visit       We're always looking for ways to make our patients' experiences even better. Do you have recommendations on ways we may improve?  no    Overall were you satisfied with your first visit to our practice? yes       I appreciate you taking the time to speak with me today. Is there anything else I can do for you? no      Thank you, and have a great day.

## 2023-06-08 ENCOUNTER — HOSPITAL ENCOUNTER (OUTPATIENT)
Dept: CARDIOLOGY | Facility: HOSPITAL | Age: 73
Discharge: HOME | End: 2023-06-08
Payer: MEDICARE

## 2023-06-08 ENCOUNTER — LAB (OUTPATIENT)
Dept: LAB | Facility: HOSPITAL | Age: 73
End: 2023-06-08
Payer: MEDICARE

## 2023-06-08 VITALS
SYSTOLIC BLOOD PRESSURE: 140 MMHG | BODY MASS INDEX: 28.16 KG/M2 | HEIGHT: 62 IN | OXYGEN SATURATION: 95 % | HEART RATE: 68 BPM | DIASTOLIC BLOOD PRESSURE: 80 MMHG | WEIGHT: 153 LBS

## 2023-06-08 DIAGNOSIS — E78.2 MIXED HYPERLIPIDEMIA: ICD-10-CM

## 2023-06-08 DIAGNOSIS — E04.2 MULTIPLE THYROID NODULES: ICD-10-CM

## 2023-06-08 DIAGNOSIS — R00.2 PALPITATIONS: ICD-10-CM

## 2023-06-08 DIAGNOSIS — K21.9 GASTROESOPHAGEAL REFLUX DISEASE WITHOUT ESOPHAGITIS: ICD-10-CM

## 2023-06-08 DIAGNOSIS — D50.0 IRON DEFICIENCY ANEMIA DUE TO CHRONIC BLOOD LOSS: ICD-10-CM

## 2023-06-08 LAB
ALBUMIN SERPL-MCNC: 4.4 G/DL (ref 3.5–5.2)
ALBUMIN/GLOB SERPL: 1.7 G/DL
ALP SERPL-CCNC: 57 U/L (ref 39–117)
ALT SERPL W P-5'-P-CCNC: 22 U/L (ref 1–33)
ANION GAP SERPL CALCULATED.3IONS-SCNC: 9.9 MMOL/L (ref 5–15)
AORTIC ARCH: 2 CM
ASCENDING AORTA: 3.5 CM
AST SERPL-CCNC: 21 U/L (ref 1–32)
BH CV ECHO MEAS - ACS: 1.89 CM
BH CV ECHO MEAS - AO MAX PG: 6.6 MMHG
BH CV ECHO MEAS - AO MEAN PG: 4 MMHG
BH CV ECHO MEAS - AO ROOT DIAM: 3.4 CM
BH CV ECHO MEAS - AO V2 MAX: 128 CM/SEC
BH CV ECHO MEAS - AO V2 VTI: 29.6 CM
BH CV ECHO MEAS - AVA(I,D): 2.13 CM2
BH CV ECHO MEAS - EDV(CUBED): 91.1 ML
BH CV ECHO MEAS - EDV(MOD-SP2): 61 ML
BH CV ECHO MEAS - EDV(MOD-SP4): 53 ML
BH CV ECHO MEAS - EF(MOD-BP): 64.4 %
BH CV ECHO MEAS - EF(MOD-SP2): 65.6 %
BH CV ECHO MEAS - EF(MOD-SP4): 66 %
BH CV ECHO MEAS - ESV(CUBED): 20.2 ML
BH CV ECHO MEAS - ESV(MOD-SP2): 21 ML
BH CV ECHO MEAS - ESV(MOD-SP4): 18 ML
BH CV ECHO MEAS - FS: 39.5 %
BH CV ECHO MEAS - IVS/LVPW: 1.13 CM
BH CV ECHO MEAS - IVSD: 0.9 CM
BH CV ECHO MEAS - LAT PEAK E' VEL: 6.4 CM/SEC
BH CV ECHO MEAS - LV DIASTOLIC VOL/BSA (35-75): 31.1 CM2
BH CV ECHO MEAS - LV MASS(C)D: 123.1 GRAMS
BH CV ECHO MEAS - LV MAX PG: 4.1 MMHG
BH CV ECHO MEAS - LV MEAN PG: 2 MMHG
BH CV ECHO MEAS - LV SYSTOLIC VOL/BSA (12-30): 10.6 CM2
BH CV ECHO MEAS - LV V1 MAX: 101 CM/SEC
BH CV ECHO MEAS - LV V1 VTI: 23.3 CM
BH CV ECHO MEAS - LVIDD: 4.5 CM
BH CV ECHO MEAS - LVIDS: 2.7 CM
BH CV ECHO MEAS - LVOT AREA: 2.7 CM2
BH CV ECHO MEAS - LVOT DIAM: 1.86 CM
BH CV ECHO MEAS - LVPWD: 0.8 CM
BH CV ECHO MEAS - MED PEAK E' VEL: 6.6 CM/SEC
BH CV ECHO MEAS - MR MAX PG: 95.5 MMHG
BH CV ECHO MEAS - MR MAX VEL: 488.6 CM/SEC
BH CV ECHO MEAS - MV A DUR: 0.13 SEC
BH CV ECHO MEAS - MV A MAX VEL: 99.9 CM/SEC
BH CV ECHO MEAS - MV DEC SLOPE: 314.7 CM/SEC2
BH CV ECHO MEAS - MV DEC TIME: 0.27 MSEC
BH CV ECHO MEAS - MV E MAX VEL: 77.1 CM/SEC
BH CV ECHO MEAS - MV E/A: 0.77
BH CV ECHO MEAS - MV MAX PG: 6 MMHG
BH CV ECHO MEAS - MV MEAN PG: 2.19 MMHG
BH CV ECHO MEAS - MV P1/2T: 77.8 MSEC
BH CV ECHO MEAS - MV V2 VTI: 38.7 CM
BH CV ECHO MEAS - MVA(P1/2T): 2.8 CM2
BH CV ECHO MEAS - MVA(VTI): 1.63 CM2
BH CV ECHO MEAS - PA ACC TIME: 0.07 SEC
BH CV ECHO MEAS - PA V2 MAX: 81.4 CM/SEC
BH CV ECHO MEAS - PULM A REVS DUR: 0.11 SEC
BH CV ECHO MEAS - PULM A REVS VEL: 41.7 CM/SEC
BH CV ECHO MEAS - PULM DIAS VEL: 43.7 CM/SEC
BH CV ECHO MEAS - PULM S/D: 1.76
BH CV ECHO MEAS - PULM SYS VEL: 76.6 CM/SEC
BH CV ECHO MEAS - QP/QS: 1.4
BH CV ECHO MEAS - RAP SYSTOLE: 8 MMHG
BH CV ECHO MEAS - RV MAX PG: 2.07 MMHG
BH CV ECHO MEAS - RV V1 MAX: 72 CM/SEC
BH CV ECHO MEAS - RV V1 VTI: 19 CM
BH CV ECHO MEAS - RVOT DIAM: 2.43 CM
BH CV ECHO MEAS - RVSP: 33.2 MMHG
BH CV ECHO MEAS - SI(MOD-SP2): 23.4 ML/M2
BH CV ECHO MEAS - SI(MOD-SP4): 20.5 ML/M2
BH CV ECHO MEAS - SUP REN AO DIAM: 2.4 CM
BH CV ECHO MEAS - SV(LVOT): 63.2 ML
BH CV ECHO MEAS - SV(MOD-SP2): 40 ML
BH CV ECHO MEAS - SV(MOD-SP4): 35 ML
BH CV ECHO MEAS - SV(RVOT): 88.4 ML
BH CV ECHO MEAS - TAPSE (>1.6): 2.12 CM
BH CV ECHO MEAS - TR MAX PG: 25.2 MMHG
BH CV ECHO MEAS - TR MAX VEL: 251.1 CM/SEC
BH CV ECHO MEASUREMENTS AVERAGE E/E' RATIO: 11.86
BH CV VAS BP LEFT ARM: NORMAL MMHG
BH CV XLRA - RV BASE: 2.8 CM
BH CV XLRA - RV LENGTH: 6.9 CM
BH CV XLRA - RV MID: 2.36 CM
BH CV XLRA - TDI S': 12 CM/SEC
BILIRUB SERPL-MCNC: 0.4 MG/DL (ref 0–1.2)
BUN SERPL-MCNC: 14 MG/DL (ref 8–23)
BUN/CREAT SERPL: 19.7 (ref 7–25)
CALCIUM SPEC-SCNC: 10.3 MG/DL (ref 8.6–10.5)
CHLORIDE SERPL-SCNC: 103 MMOL/L (ref 98–107)
CHOLEST SERPL-MCNC: 269 MG/DL (ref 0–200)
CO2 SERPL-SCNC: 28.1 MMOL/L (ref 22–29)
CREAT SERPL-MCNC: 0.71 MG/DL (ref 0.57–1)
EGFRCR SERPLBLD CKD-EPI 2021: 89.9 ML/MIN/1.73
GLOBULIN UR ELPH-MCNC: 2.6 GM/DL
GLUCOSE SERPL-MCNC: 96 MG/DL (ref 65–99)
HDLC SERPL-MCNC: 98 MG/DL (ref 40–60)
LDLC SERPL CALC-MCNC: 158 MG/DL (ref 0–100)
LDLC/HDLC SERPL: 1.58 {RATIO}
LEFT ATRIUM VOLUME INDEX: 20.8 ML/M2
POTASSIUM SERPL-SCNC: 4.5 MMOL/L (ref 3.5–5.2)
PROT SERPL-MCNC: 7 G/DL (ref 6–8.5)
SINUS: 3.1 CM
SODIUM SERPL-SCNC: 141 MMOL/L (ref 136–145)
STJ: 2.9 CM
TRIGL SERPL-MCNC: 81 MG/DL (ref 0–150)
TSH SERPL DL<=0.05 MIU/L-ACNC: 1.39 UIU/ML (ref 0.27–4.2)
VIT B12 BLD-MCNC: 798 PG/ML (ref 211–946)
VLDLC SERPL-MCNC: 13 MG/DL (ref 5–40)
WHOLE BLOOD HOLD SPECIMEN: NORMAL

## 2023-06-08 PROCEDURE — 80053 COMPREHEN METABOLIC PANEL: CPT

## 2023-06-08 PROCEDURE — 82607 VITAMIN B-12: CPT

## 2023-06-08 PROCEDURE — 36415 COLL VENOUS BLD VENIPUNCTURE: CPT

## 2023-06-08 PROCEDURE — 93306 TTE W/DOPPLER COMPLETE: CPT

## 2023-06-08 PROCEDURE — 84443 ASSAY THYROID STIM HORMONE: CPT

## 2023-06-08 PROCEDURE — 93306 TTE W/DOPPLER COMPLETE: CPT | Performed by: INTERNAL MEDICINE

## 2023-06-08 PROCEDURE — 80061 LIPID PANEL: CPT

## 2023-06-16 ENCOUNTER — ANESTHESIA EVENT (OUTPATIENT)
Dept: SURGERY | Facility: SURGERY CENTER | Age: 73
End: 2023-06-16
Payer: MEDICARE

## 2023-06-19 ENCOUNTER — ANESTHESIA (OUTPATIENT)
Dept: SURGERY | Facility: SURGERY CENTER | Age: 73
End: 2023-06-19
Payer: MEDICARE

## 2023-06-19 PROCEDURE — 25010000002 PROPOFOL 10 MG/ML EMULSION: Performed by: ANESTHESIOLOGY

## 2023-06-19 PROCEDURE — 25010000002 PROPOFOL 1000 MG/100ML EMULSION: Performed by: ANESTHESIOLOGY

## 2023-06-19 RX ORDER — LIDOCAINE HYDROCHLORIDE 20 MG/ML
INJECTION, SOLUTION INFILTRATION; PERINEURAL AS NEEDED
Status: DISCONTINUED | OUTPATIENT
Start: 2023-06-19 | End: 2023-06-19 | Stop reason: SURG

## 2023-06-19 RX ORDER — PROPOFOL 10 MG/ML
INJECTION, EMULSION INTRAVENOUS AS NEEDED
Status: DISCONTINUED | OUTPATIENT
Start: 2023-06-19 | End: 2023-06-19 | Stop reason: SURG

## 2023-06-19 RX ADMIN — PROPOFOL 140 MG: 10 INJECTION, EMULSION INTRAVENOUS at 14:14

## 2023-06-19 RX ADMIN — LIDOCAINE HYDROCHLORIDE 40 MG: 20 INJECTION, SOLUTION INFILTRATION; PERINEURAL at 14:14

## 2023-06-19 RX ADMIN — PROPOFOL 200 MCG/KG/MIN: 10 INJECTION, EMULSION INTRAVENOUS at 14:14

## 2023-06-19 NOTE — ANESTHESIA PREPROCEDURE EVALUATION
Anesthesia Evaluation     Patient summary reviewed   no history of anesthetic complications:   NPO Solid Status: > 8 hours  NPO Liquid Status: > 2 hours           Airway   Mallampati: II  TM distance: >3 FB  Neck ROM: full  No difficulty expected  Dental - normal exam     Pulmonary     breath sounds clear to auscultation  (+) a smoker Former,  (-) shortness of breath, recent URI  Cardiovascular   Exercise tolerance: good (4-7 METS)    ECG reviewed  Rhythm: regular  Rate: normal    (+) valvular problems/murmurs (mild/mod) MRhyperlipidemia  (-) past MI, dysrhythmias, angina    ROS comment: 6/8/23 TTE - Narrative  ·  Left ventricular systolic function is normal. Calculated left  ventricular EF = 64.4% Normal left ventricular cavity size noted. Left  ventricular wall thickness is consistent with moderate concentric  hypertrophy. All left ventricular wall segments contract normally. Left  ventricular diastolic function is consistent with (grade I) impaired  relaxation.  ·  Moderate mitral annular calcification is present. Mild to moderate  mitral valve regurgitation is present with multiple jets noted. No  significant mitral valve stenosis is present.  ·  Mild tricuspid valve regurgitation is present. Estimated right  ventricular systolic pressure from tricuspid regurgitation is normal (<35  mmHg). Calculated right ventricular systolic pressure from tricuspid  regurgitation is 33.2 mmHg.      Neuro/Psych  (+) headaches, numbness (cervical radiculopathy)  (-) seizures, CVA, syncope  GI/Hepatic/Renal/Endo    (+) GERD well controlled, thyroid problem thyroid nodules  (-)  obesity, liver disease, no renal disease, diabetes    Musculoskeletal     (+) neck pain  Abdominal    Substance History      OB/GYN          Other   arthritis,   history of cancer (hx GIST tumor, s/p resection)                    Anesthesia Plan    ASA 2     MAC     (MAC anesthesia discussed with patient and/or patient representative. Risks (including but  not limited to intra-op awareness), benefits, and alternatives were discussed. Understanding was voiced with an agreement to proceed with a MAC technique and General as a backup option. )    Anesthetic plan, risks, benefits, and alternatives have been provided, discussed and informed consent has been obtained with: patient.      CODE STATUS:

## 2023-08-06 DIAGNOSIS — K21.9 GASTROESOPHAGEAL REFLUX DISEASE WITHOUT ESOPHAGITIS: ICD-10-CM

## 2023-08-07 RX ORDER — PANTOPRAZOLE SODIUM 40 MG/1
TABLET, DELAYED RELEASE ORAL
Qty: 90 TABLET | Refills: 1 | Status: SHIPPED | OUTPATIENT
Start: 2023-08-07

## 2023-08-07 NOTE — TELEPHONE ENCOUNTER
Rx Refill Note  Requested Prescriptions     Pending Prescriptions Disp Refills    pantoprazole (PROTONIX) 40 MG EC tablet [Pharmacy Med Name: PANTOPRAZOLE SOD DR 40 MG TAB] 90 tablet 0     Sig: TAKE ONE TABLET BY MOUTH DAILY      Last office visit with prescribing clinician: 6/20/2023   Last telemedicine visit with prescribing clinician: Visit date not found   Next office visit with prescribing clinician: Visit date not found                         Would you like a call back once the refill request has been completed: [] Yes [] No    If the office needs to give you a call back, can they leave a voicemail: [] Yes [] No    Analilia Haas MA  08/07/23, 09:24 EDT

## 2023-09-11 ENCOUNTER — HOSPITAL ENCOUNTER (OUTPATIENT)
Dept: ULTRASOUND IMAGING | Facility: HOSPITAL | Age: 73
Discharge: HOME | End: 2023-09-11
Payer: MEDICARE

## 2023-09-11 ENCOUNTER — LAB (OUTPATIENT)
Dept: LAB | Facility: HOSPITAL | Age: 73
End: 2023-09-11
Payer: MEDICARE

## 2023-09-11 DIAGNOSIS — E04.1 THYROID NODULE: ICD-10-CM

## 2023-09-11 PROCEDURE — 76536 US EXAM OF HEAD AND NECK: CPT

## 2023-09-11 PROCEDURE — 80061 LIPID PANEL: CPT | Performed by: NURSE PRACTITIONER

## 2023-09-11 PROCEDURE — 80048 BASIC METABOLIC PNL TOTAL CA: CPT | Performed by: NURSE PRACTITIONER

## 2023-09-28 ENCOUNTER — OFFICE VISIT (OUTPATIENT)
Dept: ORTHOPEDIC SURGERY | Facility: CLINIC | Age: 73
End: 2023-09-28
Payer: MEDICARE

## 2023-09-28 VITALS — WEIGHT: 153 LBS | HEIGHT: 62 IN | RESPIRATION RATE: 16 BRPM | TEMPERATURE: 97.3 F | BODY MASS INDEX: 28.16 KG/M2

## 2023-09-28 DIAGNOSIS — Z96.651 STATUS POST RIGHT KNEE REPLACEMENT: ICD-10-CM

## 2023-09-28 DIAGNOSIS — M17.12 PRIMARY OSTEOARTHRITIS OF LEFT KNEE: ICD-10-CM

## 2023-09-28 DIAGNOSIS — R52 PAIN: Primary | ICD-10-CM

## 2023-09-28 RX ORDER — MULTIPLE VITAMINS W/ MINERALS TAB 9MG-400MCG
TAB ORAL
COMMUNITY

## 2023-09-28 RX ORDER — PSEUDOEPHEDRINE HCL 30 MG
TABLET ORAL
COMMUNITY

## 2023-09-29 RX ORDER — CHLORHEXIDINE GLUCONATE 500 MG/1
CLOTH TOPICAL 2 TIMES DAILY
OUTPATIENT
Start: 2023-09-29

## 2023-09-29 RX ORDER — MELOXICAM 7.5 MG/1
15 TABLET ORAL ONCE
OUTPATIENT
Start: 2023-09-29 | End: 2023-09-29

## 2023-09-29 RX ORDER — PREGABALIN 150 MG/1
150 CAPSULE ORAL ONCE
OUTPATIENT
Start: 2023-09-29 | End: 2023-09-29

## 2023-09-29 NOTE — PROGRESS NOTES
"Ashley Comer : 1950 MRN: [id number] DATE: 2023    DIAGNOSIS: Annual follow up right total knee / Left Knee OA     SUBJECTIVE:Patient returns today for a one year follow up of right total knee replacement as well as worsening symptoms of her left knee.  In regards to the patient's right knee which is 1 year postop from a knee replacement she reports she is doing well.  She states that she is no pain and just has some minimal soreness in the mornings.  Patient states she still has great range of motion in her knee and has no incisional skin issues.  Patient does have known advanced left knee osteoarthritis that we have been conservatively treating.  Patient reports that her symptoms have progressively worsened over the last year.  Patient describes that she has a constant severe ache in her left knee that is worse with going up and down stairs.  Patient has known bone-on-bone osteoarthritis and has undergone conservative measures of physical therapy, NSAID therapy, and intra-articular joint injections with little relief.  Patient would like to discuss surgical options of the left knee today.    OBJECTIVE:    Temp 97.3 °F (36.3 °C) (Temporal)   Resp 16   Ht 157.5 cm (62.01\")   Wt 69.4 kg (153 lb)   LMP  (LMP Unknown)   BMI 27.98 kg/m²   Family History   Problem Relation Age of Onset    Dementia Mother     Hypertension Mother     Tuberculosis Mother     Pneumonia Mother     Broken bones Mother         Wrist, hip    Osteoporosis Mother     Prostate cancer Father     Tuberculosis Father     Cancer Father         prostate w/bone mets    Ulcerative colitis Sister     Cancer Sister         Throat    Breast cancer Sister     Cancer Sister         Malig Teratoma    Cancer Sister         Breast    Cancer Sister         Breast    Cancer Brother         Prostate    Breast cancer Maternal Aunt     Cancer Maternal Aunt         Breast    Lung cancer Paternal Aunt     Brain cancer Paternal Aunt     Breast cancer " Paternal Aunt     Cancer Paternal Aunt         Breast    Cancer Paternal Aunt         Lung    Cancer Paternal Aunt         Lung    Colon polyps Maternal Grandmother     Malig Hyperthermia Neg Hx     Colon cancer Neg Hx     Crohn's disease Neg Hx     Irritable bowel syndrome Neg Hx      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    Cataract     removed 2014    Colon polyp 02/22/2016    As report to me by Dr Rodriguez    Degenerative disc disease, cervical     Disease of thyroid gland     THYROID NODULES    Edema     Esophageal reflux     GERD (gastroesophageal reflux disease)     Headache     History of GI bleed     History of transfusion 02/2016    6 UNITS    Knee swelling 2006    Left knee pain     Migraine     Neck pain     Osteopenia     Other chest pain     RESOLVED    Scoliosis     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    Urinary tract infection 1970     Past Surgical History:   Procedure Laterality Date    APPENDECTOMY  02/23/2016    CATARACT EXTRACTION Bilateral 2014    COLONOSCOPY N/A 07/28/2017    Procedure: COLONOSCOPY INTO CECUM WITH POLYPECTOMY;  Surgeon: Mini Meraz MD;  Location: [facility] ENDOSCOPY;  Service:     COLONOSCOPY N/A 07/30/2021    Procedure: COLONOSCOPY;  Surgeon: Mini Meraz MD;  Location: [facility] [place] MAIN OR;  Service: Gastroenterology;  Laterality: N/A;  normal    ENDOSCOPY N/A 07/28/2017    Procedure: ESOPHAGOGASTRODUODENOSCOPY WITH BIOPSIES;  Surgeon: Mini Meraz MD;  Location: [facility] ENDOSCOPY;  Service:     ENDOSCOPY N/A 07/30/2021    Procedure: ESOPHAGOGASTRODUODENOSCOPY;  Surgeon: Mini Meraz MD;  Location: [facility] [place] MAIN OR;  Service: Gastroenterology;  Laterality: N/A;  gastritis, esophagitis    ENDOSCOPY N/A 6/19/2023    Procedure: ESOPHAGOGASTRODUODENOSCOPY  WITH BIOPSY;  Surgeon: Mookie Hawkins MD;  Location: [facility] [place] MAIN OR;  Service: Gastroenterology;  Laterality: N/A;  gastric polyps    FOOT SURGERY Right     DEBRIDMENT    JOINT REPLACEMENT  2022    LAPAROSCOPIC APPENDECTOMY      SMALL INTESTINE SURGERY  2016    for GIST     THYROID SURGERY Left     thyroid lobectomy     TOTAL KNEE ARTHROPLASTY Right 2022    Procedure: TOTAL KNEE ARTHROPLASTY;  Surgeon: Daniele Mcelroy MD;  Location:  [facility] [place];  Service: Orthopedics;  Laterality: Right;    VITRECTOMY Right 2015     Social History     Socioeconomic History    Marital status:      Spouse name: Jimmy    Number of children: 2    Years of education: COLLEGE   Tobacco Use    Smoking status: Former     Packs/day: 1.00     Years: 25.00     Pack years: 25.00     Types: Cigarettes     Start date: 1968     Quit date: 1992     Years since quittin.7    Smokeless tobacco: Never   Vaping Use    Vaping Use: Never used   Substance and Sexual Activity    Alcohol use: Yes     Comment: occas    Drug use: No    Sexual activity: Not Currently     Partners: Male     Review of Systems: 14 point review of systems performed, all systems negative     Exam:. (Right Knee) The incision is well healed. Range of motion is measured at 0 to 125. The calf is soft and nontender with a negative Homans sign. Alignment is neutral. Good quad strength. There is no evidence of varus/valgus or flexion instability. No effusion. Intact to light touch with palpable distal pulses.     Knee:  left    ALIGNMENT: Normal alignment , Patella  tracks  midline    GAIT:    Antalgic    SKIN:   global   fascial edema     RANGE OF MOTION:   5  -  115   DEG    STRENGTH:   4  / 5    LIGAMENTS:    No varus / valgus instability.   Negative  Lachman.    MENISCUS:   Positive   Juan Ramon       DISTAL PULSES:    Paplable    DISTAL SENSATION :   Intact    LYMPHATICS:     No   lymphadenopathy    OTHER:          - Positive    effusion      - Severe Crepitance with ROM       DIAGNOSTIC STUDIES  Xrays: 3 views(AP bilateral knees, lateral right, and sunrise bilateral knees) were ordered and reviewed for evaluation of right knee replacement. They demonstrate a well positioned, well aligned knee replacement without complicating factors noted. In comparison with previous films there has been no change to the right knee.  On the AP and sunrise view patient is noted to have advanced patellofemoral osteoarthritis in which she is losing the integrity of her patella.  She also has osteoarthritic changes as well as chondrocalcinosis to the medial and lateral compartments.  In comparison to previous films patient does demonstrate arthritic progression of the left knee.    ASSESSMENT: Annual follow up right knee replacement /primary osteoarthritis left knee    PLAN: Right Knee   1) Continue activities as tolerated    2) Follow up PRN    PLAN: Left Knee  Continuation of conservative management vs. TKA discussed.  The patient wishes to proceed with total knee replacement.  At this point the patient has failed the full compliment of conservative treatment and stating complete understanding of the risks/benefits/ anternatives wishes to proceed with surgical treatment.    Risk and benefits of surgery were reviewed.  Including, but not limited to, blood clots or pulmonary embolism, anesthesia risk, infection, fracture, skin/leg numbness, persistent pain/crepitance/popping/catching, failure of the implant, need for future surgeries, hematoma, possible nerve or blood vessel injury, need for transfusion, and potential risk of stroke,heart attack or death, among others.  The patient understands and wishes to proceed.     It was explained that if tissue has been repaired or reconstructed, there is also an increased chance of failure which may require further management.  Following the completion of the discussion, the patient expressed understanding of this  planned course of care, all their questions were answered and consent will be obtained preoperatively.    Operative Plan: [facility] Total Knee Replacement performing the procedure on an outpatient basis with home health rehab.  Patient does not require any medical clearances before proceeding forward surgery.  Patient has a current BMI of 27.98 kg/m².      Xavi Delgado, APRN  9/29/2023    This patient was seen in conjunction today with Dr. Daniele Mcelroy.  Dr. Mcelroy agrees with the above-stated assessment and plan.

## 2023-10-03 ENCOUNTER — OFFICE VISIT (OUTPATIENT)
Dept: FAMILY MEDICINE CLINIC | Facility: CLINIC | Age: 73
End: 2023-10-03
Payer: MEDICARE

## 2023-10-03 VITALS
DIASTOLIC BLOOD PRESSURE: 72 MMHG | SYSTOLIC BLOOD PRESSURE: 124 MMHG | HEART RATE: 67 BPM | TEMPERATURE: 98.2 F | BODY MASS INDEX: 28.85 KG/M2 | WEIGHT: 156.8 LBS | HEIGHT: 62 IN | OXYGEN SATURATION: 97 %

## 2023-10-03 DIAGNOSIS — I07.1 TRICUSPID VALVE INSUFFICIENCY, UNSPECIFIED ETIOLOGY: ICD-10-CM

## 2023-10-03 DIAGNOSIS — I51.9 LEFT VENTRICULAR DIASTOLIC DYSFUNCTION: ICD-10-CM

## 2023-10-03 DIAGNOSIS — E78.00 PURE HYPERCHOLESTEROLEMIA: ICD-10-CM

## 2023-10-03 DIAGNOSIS — I34.0 MITRAL VALVE INSUFFICIENCY, UNSPECIFIED ETIOLOGY: ICD-10-CM

## 2023-10-03 DIAGNOSIS — M54.50 LUMBAR BACK PAIN: Primary | ICD-10-CM

## 2023-10-03 RX ORDER — EZETIMIBE 10 MG/1
10 TABLET ORAL DAILY
Qty: 90 TABLET | Refills: 1 | Status: SHIPPED | OUTPATIENT
Start: 2023-10-03

## 2023-10-03 RX ORDER — MULTIPLE VITAMINS W/ MINERALS TAB 9MG-400MCG
2 TAB ORAL DAILY
COMMUNITY

## 2023-10-03 NOTE — PROGRESS NOTES
Chief Complaint  Establish Care (New patient) and Back Pain    Subjective         Back Pain  Pertinent negatives include no abdominal pain, chest pain, dysuria or fever.     Ashley Comer 73 y.o. female presents today for a new patient appointment. She is here to establish care and is a new patient to me. I reviewed the PFSH recorded today by my MA/LPN staff.       She had one episode of bilateral lower back pain around 10 days ago.  The episode lasted around three hours and then resolved.  She did go to the gym the day before and worked on the legs machine.  She has not had any further back pain.  No lower extremity weakness, decreased sensation, or abnormal gait.  No loss of bowel or bladder function.    She is followed by Dr. Delgado, Orthopedics due to a history of right total knee replacement surgery in 2022.  She is waiting to have the left knee replacement surgery scheduled.    The patient is followed by Gastroenterology due to GERD and a history of GIST status post laparoscopic appendectomy and partial small bowel resection with reanastomosis February 22, 2016 with Dr. Meraz.  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  Her prognosis was good with surgery alone and adjunctive Gleevec therapy was not recommended.  She is compliant with taking Pantoprazole 40 mg daily.  She is due for a repeat colonoscopy in July 2026.    The patient is managed by Dr. Mejias, Hematology/Oncology for chronic iron deficiency anemia.    The patient has moderate LVH, mitral calcification, mild regurgitation, and tricuspid valve with mild regurgitation.  She completed a Holter monitor in June of this year that showed a relatively benign monitor study.  Underlying heart rhythm was sinus rhythm with an average heart rate of 74 bpm and a heart rate range of 47 bpm up to 135 bpm, 18 episodes of paroxysmal supraventricular tachycardia with the longest episode lasting 9 seconds, and  patient triggered events correlated with episodes of premature atrial and ventricular contractions.  The patient was referred to Cardiology to follow-up on those findings, as well as echocardiogram findings.  She had an echocardiogram performed in June of this year that showed normal left ventricular systolic function, calculated left ventricular EF of 64.4%, moderate mitral annular calcification with mild to moderate mitral valve regurgitation, and mild tricuspid valve regurgitation.  She was not contacted by Cardiology to schedule the appointment.    Hyperlipidemia: Lipid panel in June of this year revealed elevated total cholesterol at 269, triglycerides normal at 81, HDL elevated at 98, and LDL cholesterol elevated at 158.  She has declined starting statin medications in the past.  After a discussion with the patient today regarding risks associated with chronic untreated elevated cholesterol, the patient does agree to start Zetia daily.    The patient takes Boniva for osteoporosis.  She was diagnosed in January 2022.  She is compliant with taking daily calcium and vitamin D supplements.  She performs low weightbearing exercises.    The patient has stable thyroid nodules.  She is status post left hemithyroidectomy.  She is followed by Dr. Naeem Olea E[facility].  Thyroid ultrasound was performed on 9/11/2023 and showed stable thyroid nodules, TI-RADS 3, and to consider a 2-year follow-up if clinically indicated.      Review of Systems   Constitutional:  Negative for chills, fatigue and fever.   HENT:  Negative for congestion and sinus pressure.    Eyes:  Negative for visual disturbance.   Respiratory:  Negative for cough, chest tightness and shortness of breath.    Cardiovascular:  Negative for chest pain and palpitations.   Gastrointestinal:  Negative for abdominal pain, constipation, diarrhea, nausea and vomiting.   Genitourinary:  Negative for decreased urine volume, difficulty urinating, dysuria, frequency,  "hematuria and urgency.   Musculoskeletal:  Positive for back pain (resolved). Negative for gait problem, joint swelling, neck pain and neck stiffness.   Skin:  Negative for rash.   Neurological:  Negative for dizziness, syncope and light-headedness.   Psychiatric/Behavioral:  Negative for behavioral problems and sleep disturbance.       Objective   Vital Signs:   /72 (BP Location: Left arm, Patient Position: Sitting, Cuff Size: Adult)   Pulse 67   Temp 98.2 °F (36.8 °C) (Oral)   Ht 157.5 cm (62.01\")   Wt 71.1 kg (156 lb 12.8 oz)   SpO2 97%   BMI 28.67 kg/m²         BMI is >= 25 and <30. (Overweight) The following options were offered after discussion;: exercise counseling/recommendations and nutrition counseling/recommendations         Physical Exam  Vitals and nursing note reviewed.   Constitutional:       General: She is not in acute distress.     Appearance: She is well-developed and overweight. She is not diaphoretic.   HENT:      Head: Normocephalic and atraumatic.   Eyes:      General: No scleral icterus.     Conjunctiva/sclera: Conjunctivae normal.      Pupils: Pupils are equal, round, and reactive to light.   Neck:      Vascular: No carotid bruit.   Cardiovascular:      Rate and Rhythm: Normal rate and regular rhythm.      Pulses: Normal pulses.           Posterior tibial pulses are 2+ on the right side and 2+ on the left side.      Heart sounds: Normal heart sounds. No murmur heard.    No gallop.   Pulmonary:      Effort: Pulmonary effort is normal. No respiratory distress.      Breath sounds: Normal breath sounds. No wheezing or rales.   Chest:      Chest wall: No tenderness.   Abdominal:      Palpations: Abdomen is soft.      Tenderness: There is no abdominal tenderness.   Musculoskeletal:         General: No tenderness or deformity.      Cervical back: Normal range of motion and neck supple.      Lumbar back: No swelling, edema, spasms, tenderness or bony tenderness. Normal range of motion. " Negative right straight leg raise test and negative left straight leg raise test.      Right lower leg: No edema.      Left lower leg: No edema.   Skin:     General: Skin is warm and dry.      Findings: No rash.   Neurological:      Mental Status: She is alert and oriented to person, place, and time.      Sensory: Sensation is intact. No sensory deficit.      Motor: Motor function is intact. No weakness, tremor, atrophy or abnormal muscle tone.      Coordination: Coordination is intact. Coordination normal.      Gait: Gait is intact. Gait normal.      Deep Tendon Reflexes: Reflexes are normal and symmetric. Reflexes normal.      Reflex Scores:       Patellar reflexes are 2+ on the right side and 2+ on the left side.       Achilles reflexes are 2+ on the right side and 2+ on the left side.  Psychiatric:         Behavior: Behavior normal.         Thought Content: Thought content normal.         Judgment: Judgment normal.        The following data was reviewed by: CARMELA Wray on 10/03/2023:  US Thyroid (09/11/2023 10:32)   Lipid Panel With LDL / HDL Ratio (06/08/2023 09:36)   Adult Transthoracic Echo Complete W/ Cont if Necessary Per Protocol (06/08/2023 09:05)   Holter Monitor - 72 Hour Up To 15 Days (06/08/2023 09:09)              Assessment and Plan      Diagnoses and all orders for this visit:    1. Lumbar back pain (Primary)  Comments:  Resolved  Avoid overuse  Follow up if symptoms return    2. Pure hypercholesterolemia  Comments:  New patient  Elevated  Start Zetia daily  Low cholesterol diet, exercise, weight loss  Recheck lipids in 6 months  Follow up in 6 months  Orders:  -     ezetimibe (Zetia) 10 MG tablet; Take 1 tablet by mouth Daily.  Dispense: 90 tablet; Refill: 1  -     Ambulatory Referral to Cardiology    3. Left ventricular diastolic dysfunction  -     Ambulatory Referral to Cardiology    4. Mitral valve insufficiency, unspecified etiology  -     Ambulatory Referral to Cardiology    5.  Tricuspid valve insufficiency, unspecified etiology  -     Ambulatory Referral to Cardiology            Follow Up     Return in about 6 months (around 4/3/2024) for Next scheduled follow up.    Patient was given instructions and counseling regarding her condition or for health maintenance advice. Please see specific information pulled into the AVS if appropriate.     -Referral to Cardiology.

## 2023-10-10 ENCOUNTER — TELEPHONE (OUTPATIENT)
Dept: CARDIOLOGY | Facility: CLINIC | Age: 73
End: 2023-10-10

## 2023-10-10 NOTE — TELEPHONE ENCOUNTER
Hub staff attempted to follow warm transfer process and was unsuccessful     Caller: Ashley Comer    Relationship to patient: Self    Best call back number: [phone]    Patient is needing: TO SCHEDULE REFERRAL FOR CARDIOLOGY

## 2023-10-27 ENCOUNTER — OFFICE VISIT (OUTPATIENT)
Age: 73
End: 2023-10-27
Payer: MEDICARE

## 2023-10-27 VITALS
HEART RATE: 73 BPM | SYSTOLIC BLOOD PRESSURE: 130 MMHG | HEIGHT: 62 IN | BODY MASS INDEX: 29.26 KG/M2 | WEIGHT: 159 LBS | OXYGEN SATURATION: 98 % | DIASTOLIC BLOOD PRESSURE: 86 MMHG

## 2023-10-27 DIAGNOSIS — R00.2 PALPITATIONS: ICD-10-CM

## 2023-10-27 DIAGNOSIS — E78.2 MIXED HYPERLIPIDEMIA: Primary | ICD-10-CM

## 2023-10-27 DIAGNOSIS — R01.1 SYSTOLIC MURMUR: ICD-10-CM

## 2023-10-27 PROCEDURE — 1160F RVW MEDS BY RX/DR IN RCRD: CPT | Performed by: STUDENT IN AN ORGANIZED HEALTH CARE EDUCATION/TRAINING PROGRAM

## 2023-10-27 PROCEDURE — 1159F MED LIST DOCD IN RCRD: CPT | Performed by: STUDENT IN AN ORGANIZED HEALTH CARE EDUCATION/TRAINING PROGRAM

## 2023-10-27 PROCEDURE — 93000 ELECTROCARDIOGRAM COMPLETE: CPT | Performed by: STUDENT IN AN ORGANIZED HEALTH CARE EDUCATION/TRAINING PROGRAM

## 2023-10-27 PROCEDURE — 99204 OFFICE O/P NEW MOD 45 MIN: CPT | Performed by: STUDENT IN AN ORGANIZED HEALTH CARE EDUCATION/TRAINING PROGRAM

## 2023-10-27 NOTE — PROGRESS NOTES
Subjective:     Encounter Date:10/27/2023      Patient ID: Ashley Comer is a 73 y.o. female.    Chief Complaint:  Chest discomfort, palpitations    HPI:   73 y.o. female with GERD who presents for initial evaluation of palpitations and chest discomfort.  Patient notes that a couple of weeks ago she was experiencing significant chest discomfort which she was attributing to esophageal spasms.  She separately went to her primary care office who ordered an echocardiogram.  She was also referred to GI who believes she was having a flare of her GERD.  Her omeprazole was changed to pantoprazole and her symptoms resolved.  Echocardiogram was performed in June and revealed a normal EF, with grade 1 diastolic dysfunction, moderate MAC with mild mitral regurgitation.  She notes that she had also been experiencing intermittent palpitations so a Holter monitor was placed.  This was also performed in June and revealed normal sinus rhythm with 18 episodes of paroxysmal SVT longest lasting 9 seconds as well as PACs and PVCs.  Patient triggered episodes correlated with PACs and PVCs.  Her palpitations have since significantly improved.    The following portions of the patient's history were reviewed and updated as appropriate: allergies, current medications, past family history, past medical history, past social history, past surgical history and problem list.     REVIEW OF SYSTEMS:   All systems reviewed.  Pertinent positives identified in HPI.  All other systems are negative.    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    Cataract     removed 2014    Colon polyp 02/22/2016    As report to me by Dr Rodriguez    Degenerative disc disease, cervical     Disease of thyroid gland     THYROID NODULES    Edema     Esophageal reflux     GERD (gastroesophageal reflux disease)     Headache     History of GI bleed      History of transfusion 2016    6 UNITS    Knee swelling     Left knee pain     Migraine     Neck pain     Osteopenia     Osteoporosis     Other chest pain     RESOLVED    Scoliosis     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    Urinary tract infection 1970       Family History   Problem Relation Age of Onset    Dementia Mother     Hypertension Mother     Tuberculosis Mother     Pneumonia Mother     Broken bones Mother         Wrist, hip    Osteoporosis Mother     Prostate cancer Father     Tuberculosis Father     Cancer Father         prostate w/bone mets    Ulcerative colitis Sister     Cancer Sister         Throat    Breast cancer Sister     Cancer Sister         Malig Teratoma    Cancer Sister         Breast    Cancer Sister         Breast    Cancer Brother         Prostate    Breast cancer Maternal Aunt     Cancer Maternal Aunt         Breast    Lung cancer Paternal Aunt     Brain cancer Paternal Aunt     Breast cancer Paternal Aunt     Cancer Paternal Aunt         Breast    Cancer Paternal Aunt         Lung    Cancer Paternal Aunt         Lung    Colon polyps Maternal Grandmother     Malig Hyperthermia Neg Hx     Colon cancer Neg Hx     Crohn's disease Neg Hx     Irritable bowel syndrome Neg Hx        Social History     Socioeconomic History    Marital status:      Spouse name: Jimmy    Number of children: 2    Years of education: COLLEGE   Tobacco Use    Smoking status: Former     Packs/day: 1.00     Years: 25.00     Additional pack years: 0.00     Total pack years: 25.00     Types: Cigarettes     Start date: 1968     Quit date: 1992     Years since quittin.8    Smokeless tobacco: Never   Vaping Use    Vaping Use: Never used   Substance and Sexual Activity    Alcohol use: Yes     Comment: occas    Drug use: No    Sexual activity: Not Currently     Partners: Male       Allergies   Allergen  Reactions    Penicillins Rash     Rash over whole body       Past Surgical History:   Procedure Laterality Date    APPENDECTOMY  02/23/2016    CATARACT EXTRACTION Bilateral 2014    COLONOSCOPY N/A 07/28/2017    Procedure: COLONOSCOPY INTO CECUM WITH POLYPECTOMY;  Surgeon: Mini Meraz MD;  [facility];  Service:     COLONOSCOPY N/A 07/30/2021    Procedure: COLONOSCOPY;  Surgeon: Mini Meraz MD;  Location[facility];  Service: Gastroenterology;  Laterality: N/A;  normal    ENDOSCOPY N/A 07/28/2017    Procedure: ESOPHAGOGASTRODUODENOSCOPY WITH BIOPSIES;  Surgeon: Mini Meraz MD;  [facility];  Service:     ENDOSCOPY N/A 07/30/2021    Procedure: ESOPHAGOGASTRODUODENOSCOPY;  Surgeon: Mini Meraz MD;  Location: [facility];  Service: Gastroenterology;  Laterality: N/A;  gastritis, esophagitis    ENDOSCOPY N/A 6/19/2023    Procedure: ESOPHAGOGASTRODUODENOSCOPY WITH BIOPSY;  Surgeon: Mookie Hawkins MD;  Location: [facility];  Service: Gastroenterology;  Laterality: N/A;  gastric polyps    FOOT SURGERY Right 1954    DEBRIDMENT    JOINT REPLACEMENT  9/30/2022    LAPAROSCOPIC APPENDECTOMY      SMALL INTESTINE SURGERY  02/2016    for GIST     THYROID SURGERY Left 1993    thyroid lobectomy     TOTAL KNEE ARTHROPLASTY Right 09/30/2022    Procedure: TOTAL KNEE ARTHROPLASTY;  Surgeon: Daniele Mcelroy MD;  Location:  [facility];  Service: Orthopedics;  Laterality: Right;    VITRECTOMY Right 11/2015         ECG 12 Lead    Date/Time: 10/27/2023 2:32 PM  Performed by: Henry Taylor MD    Authorized by: Henry Taylor MD  Comparison: compared with previous ECG from 5/11/2023  Similar to previous ECG  Rhythm: sinus rhythm  Rate: normal  Conduction: conduction normal  QRS axis: normal    Clinical impression: normal ECG             Objective:         Vitals:    10/27/23 1402   BP: 130/86   Pulse: 73   SpO2: 98%       PHYSICAL EXAM:  GEN: well appearing, in NAD   HEENT:  NCAT, EOMI, moist mucus membranes   Respiratory: CTAB, no wheezes, rales or rhonchi  CV: normal rate, regular rhythm, normal S1, S2, 2/6 systolic murmur in RUSB, rubs, gallops, +2 radial pulses b/l  GI: soft, nontender, nondistended  MSK: no edema  Skin: no rash, warm, dry  Heme/Lymph: no bruising or bleeding  Neuro: Alert and Oriented x 3, grossly normal motor function        Assessment:         (E78.2) Mixed hyperlipidemia    (R00.2) Palpitations    (R01.1) Systolic murmur    73 y.o. female with GERD who presents for initial evaluation of palpitations and chest discomfort.       Plan:       #Palpitations  2/2 PACs, PVCs.  They have improved.  No further work-up or treatment indicated at this time.  If they were to worsen in severity and frequency, could consider beta-blocker therapy.    #Hyperlipidemia  Patient's ASCVD risk is 17%.  Her most recent lipid panel revealed total cholesterol of 260, , HDL 95.  She is hesitant about starting statins.  She was recently started on Zetia.  - Repeat lipid panel in a couple of months, if still uncontrolled, she will consider statin therapy.  We also discussed that she could try red yeast rice.    #Systolic murmur  Patient with systolic murmur in the right upper sternal border.  Echocardiogram with no evidence of aortic valve disease.  She did have mild MR.  No further work-up at this time, she will monitor for any symptoms of dyspnea on exertion or anginal equivalents.    Manjula garnica, thank you very much for referring this kind patient to me. Please call me with any questions or concerns. I will see the patient again in the office as needed         Henry Taylor MD  10/27/23  [facility]    Outpatient Encounter Medications as of 10/27/2023   Medication Sig Dispense Refill    acetaminophen (TYLENOL) 500 MG tablet Take 2 tablets by mouth Every 6 (Six) Hours As Needed for Mild Pain.      Calcium Citrate 250 MG tablet       cetirizine (zyrTEC) 10 MG tablet  Take 1 tablet by mouth Daily.      cholecalciferol (VITAMIN D3) 25 MCG (1000 UT) tablet       ezetimibe (Zetia) 10 MG tablet Take 1 tablet by mouth Daily. 90 tablet 1    fluticasone (FLONASE) 50 MCG/ACT nasal spray 2 sprays into the nostril(s) as directed by provider Daily.      hydroCHLOROthiazide (HYDRODIURIL) 12.5 MG tablet TAKE ONE TABLET BY MOUTH DAILY 30 tablet 0    ibandronate (BONIVA) 150 MG tablet Take 1 tablet by mouth Every 30 (Thirty) Days. DUE ON SEPT 24/2022      multivitamin with minerals (HAIR SKIN AND NAILS FORMULA PO) Take 2 tablets by mouth Daily. Gummies      pantoprazole (PROTONIX) 40 MG EC tablet TAKE ONE TABLET BY MOUTH DAILY 90 tablet 1    Pediatric Multivitamins-Iron (Flintstones Complete) 18 MG chewable tablet chewable tablet       VITAMIN B COMPLEX-C PO       [DISCONTINUED] calcium carbonate (TUMS) 500 MG chewable tablet       [DISCONTINUED] multivitamin with minerals tablet tablet        No facility-administered encounter medications on file as of 10/27/2023.

## 2023-11-06 ENCOUNTER — LAB (OUTPATIENT)
Dept: LAB | Facility: HOSPITAL | Age: 73
End: 2023-11-06
Payer: MEDICARE

## 2023-11-06 DIAGNOSIS — C49.A3 GIST (GASTROINTESTINAL STROMAL TUMOR) OF SMALL BOWEL, MALIGNANT: ICD-10-CM

## 2023-11-06 DIAGNOSIS — D50.0 IRON DEFICIENCY ANEMIA DUE TO CHRONIC BLOOD LOSS: ICD-10-CM

## 2023-11-06 LAB
ALBUMIN SERPL-MCNC: 4.3 G/DL (ref 3.5–5.2)
ALBUMIN/GLOB SERPL: 1.7 G/DL
ALP SERPL-CCNC: 49 U/L (ref 39–117)
ALT SERPL W P-5'-P-CCNC: 23 U/L (ref 1–33)
ANION GAP SERPL CALCULATED.3IONS-SCNC: 17.5 MMOL/L (ref 5–15)
AST SERPL-CCNC: 24 U/L (ref 1–32)
BASOPHILS # BLD AUTO: 0.07 10*3/MM3 (ref 0–0.2)
BASOPHILS NFR BLD AUTO: 1.2 % (ref 0–1.5)
BILIRUB SERPL-MCNC: 0.4 MG/DL (ref 0–1.2)
BUN SERPL-MCNC: 14 MG/DL (ref 8–23)
BUN/CREAT SERPL: 21.9 (ref 7–25)
CALCIUM SPEC-SCNC: 9.8 MG/DL (ref 8.6–10.5)
CHLORIDE SERPL-SCNC: 100 MMOL/L (ref 98–107)
CO2 SERPL-SCNC: 26.5 MMOL/L (ref 22–29)
CREAT SERPL-MCNC: 0.64 MG/DL (ref 0.6–1.1)
DEPRECATED RDW RBC AUTO: 41.3 FL (ref 37–54)
EGFRCR SERPLBLD CKD-EPI 2021: 92.9 ML/MIN/1.73
EOSINOPHIL # BLD AUTO: 0.13 10*3/MM3 (ref 0–0.4)
EOSINOPHIL NFR BLD AUTO: 2.3 % (ref 0.3–6.2)
ERYTHROCYTE [DISTWIDTH] IN BLOOD BY AUTOMATED COUNT: 12 % (ref 12.3–15.4)
FERRITIN SERPL-MCNC: 119 NG/ML (ref 13–150)
GLOBULIN UR ELPH-MCNC: 2.6 GM/DL
GLUCOSE SERPL-MCNC: 88 MG/DL (ref 65–99)
HCT VFR BLD AUTO: 44.1 % (ref 34–46.6)
HGB BLD-MCNC: 14.6 G/DL (ref 12–15.9)
IMM GRANULOCYTES # BLD AUTO: 0.02 10*3/MM3 (ref 0–0.05)
IMM GRANULOCYTES NFR BLD AUTO: 0.4 % (ref 0–0.5)
IRON 24H UR-MRATE: 104 MCG/DL (ref 37–145)
IRON SATN MFR SERPL: 29 % (ref 20–50)
LYMPHOCYTES # BLD AUTO: 1.47 10*3/MM3 (ref 0.7–3.1)
LYMPHOCYTES NFR BLD AUTO: 26.2 % (ref 19.6–45.3)
MCH RBC QN AUTO: 30.7 PG (ref 26.6–33)
MCHC RBC AUTO-ENTMCNC: 33.1 G/DL (ref 31.5–35.7)
MCV RBC AUTO: 92.6 FL (ref 79–97)
MONOCYTES # BLD AUTO: 0.5 10*3/MM3 (ref 0.1–0.9)
MONOCYTES NFR BLD AUTO: 8.9 % (ref 5–12)
NEUTROPHILS NFR BLD AUTO: 3.43 10*3/MM3 (ref 1.7–7)
NEUTROPHILS NFR BLD AUTO: 61 % (ref 42.7–76)
NRBC BLD AUTO-RTO: 0 /100 WBC (ref 0–0.2)
PLATELET # BLD AUTO: 287 10*3/MM3 (ref 140–450)
PMV BLD AUTO: 8.6 FL (ref 6–12)
POTASSIUM SERPL-SCNC: 4 MMOL/L (ref 3.5–5.2)
PROT SERPL-MCNC: 6.9 G/DL (ref 6–8.5)
RBC # BLD AUTO: 4.76 10*6/MM3 (ref 3.77–5.28)
SODIUM SERPL-SCNC: 144 MMOL/L (ref 136–145)
TIBC SERPL-MCNC: 353 MCG/DL (ref 298–536)
TRANSFERRIN SERPL-MCNC: 252 MG/DL (ref 200–360)
VIT B12 BLD-MCNC: 776 PG/ML (ref 211–946)
WBC NRBC COR # BLD: 5.62 10*3/MM3 (ref 3.4–10.8)

## 2023-11-06 PROCEDURE — 82728 ASSAY OF FERRITIN: CPT

## 2023-11-06 PROCEDURE — 82607 VITAMIN B-12: CPT | Performed by: INTERNAL MEDICINE

## 2023-11-06 PROCEDURE — 36415 COLL VENOUS BLD VENIPUNCTURE: CPT

## 2023-11-06 PROCEDURE — 84466 ASSAY OF TRANSFERRIN: CPT

## 2023-11-06 PROCEDURE — 85025 COMPLETE CBC W/AUTO DIFF WBC: CPT

## 2023-11-06 PROCEDURE — 83540 ASSAY OF IRON: CPT

## 2023-11-06 PROCEDURE — 80053 COMPREHEN METABOLIC PANEL: CPT

## 2023-11-13 ENCOUNTER — OFFICE VISIT (OUTPATIENT)
Dept: ONCOLOGY | Facility: CLINIC | Age: 73
End: 2023-11-13
Payer: MEDICARE

## 2023-11-13 VITALS
RESPIRATION RATE: 18 BRPM | HEART RATE: 68 BPM | WEIGHT: 158.8 LBS | OXYGEN SATURATION: 97 % | BODY MASS INDEX: 29.22 KG/M2 | DIASTOLIC BLOOD PRESSURE: 77 MMHG | HEIGHT: 62 IN | TEMPERATURE: 98 F | SYSTOLIC BLOOD PRESSURE: 121 MMHG

## 2023-11-13 DIAGNOSIS — D50.9 IRON DEFICIENCY ANEMIA, UNSPECIFIED IRON DEFICIENCY ANEMIA TYPE: ICD-10-CM

## 2023-11-13 DIAGNOSIS — C49.A3 GIST (GASTROINTESTINAL STROMAL TUMOR) OF SMALL BOWEL, MALIGNANT: Primary | ICD-10-CM

## 2023-11-13 RX ORDER — AMPICILLIN TRIHYDRATE 250 MG
CAPSULE ORAL
COMMUNITY
Start: 2023-11-01

## 2023-11-13 NOTE — PROGRESS NOTES
Subjective   REASON FOR FOLLOW UP:   1.  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  2.  Iron deficiency anemia due to gastritis. Corrected with oral iron supplementation    HISTORY OF PRESENT ILLNESS:     History of Present Illness     Mrs. Comer is a 73 y.o. female here today for annual follow-up visit to our office.  She had previously been followed in our office for resected GIST and had completed 5 years of surveillance with a CT scan in January 2021.  We were planning to see her on an as-needed basis after she completed her 5 years of surveillance.  We did not schedule routine appointment but she called and scheduled another appointment due to a new problem.    She was noted to be anemic on labs from June 2021 with a ferritin level of 5.  She started on over-the-counter [place] vitamins with iron and has been taking 1 a day.  She also underwent upper and lower GI endoscopy with her surgeon Dr. Mini Meraz.  The EGD did show gastritis most likely related to her use of nonsteroidal anti-inflammatory drugs.    She has stopped taking Naprosyn and has continued taking omeprazole daily.     She continues to take [place] vitamins with iron.  She had her labs performed on 11/6/2023 which showed that she is still in good shape in regards to her iron stores.  Her iron saturation was 29% with a serum ferritin of 119.  Her hemoglobin was normal at 14.6 g/dL.    Her B12 level was also normal at 776.      Past Oncologic History  She had been seen in consultation during her admission to [facility] from 2/20/2016 to 2/26/2016.  She was admitted at that time with symptomatic anemia and GI bleeding.  On endoscopy the blood appeared to be coming from the region of the appendix and she underwent a laparoscopic appendectomy with Dr. Meraz on 2/22/2016.  Also at the same procedure she was found to have a tumor within the small bowel and had a partial small bowel resection with reanastomosis.  The pathology  from that specimen showed a gastrointestinal stromal tumor (GIST) .  The tumor was 4.5 cm and appeared to be low-grade with 0 mitotic figures per high-powered field.    We discussed with Miss Comer at that time that we would not recommend any postop adjuvant Gleevec therapy as her prognosis was very good with surgery alone.    Past Medical History, Past Surgical History, Social History, Family History have been reviewed and are without significant changes except as mentioned.    Review of Systems   Constitutional:  Negative for activity change, appetite change, fatigue, fever and unexpected weight change.   HENT:  Negative for hearing loss, nosebleeds, trouble swallowing and voice change.    Eyes:  Negative for visual disturbance.   Respiratory:  Negative for cough, shortness of breath and wheezing.    Cardiovascular:  Negative for chest pain and palpitations.   Gastrointestinal:  Negative for abdominal pain, diarrhea, nausea and vomiting.   Genitourinary:  Negative for difficulty urinating, frequency, hematuria and urgency.   Musculoskeletal:  Negative for back pain and neck pain.   Skin:  Negative for rash.   Neurological:  Negative for dizziness, seizures, syncope and headaches.   Hematological:  Negative for adenopathy. Does not bruise/bleed easily.   Psychiatric/Behavioral:  Negative for behavioral problems. The patient is not nervous/anxious.       A comprehensive 14 point review of systems was performed and was negative except as mentioned.    Medications:  The current medication list was reviewed in the EMR    ALLERGIES:    Allergies   Allergen Reactions    Penicillins Rash     Rash over whole body       Objective      There were no vitals filed for this visit.        11/10/2022     2:10 PM   Current Status   ECOG score 0       Physical Exam   Constitutional: She is oriented to person, place, and time. She appears well-developed. No distress.   HENT:   Head: Normocephalic.   Eyes: Pupils are equal, round,  and reactive to light. Conjunctivae are normal. No scleral icterus.   Neck: No JVD present. No thyromegaly present.   Cardiovascular: Normal rate and regular rhythm. Exam reveals no gallop and no friction rub.   No murmur heard.  Pulmonary/Chest: Effort normal and breath sounds normal. She has no wheezes. She has no rales.   Abdominal: Soft. She exhibits no distension and no mass. There is no abdominal tenderness.   Musculoskeletal: Normal range of motion. No deformity.   Lymphadenopathy:     She has no cervical adenopathy.   Neurological: She is alert and oriented to person, place, and time. She has normal reflexes. No cranial nerve deficit.   Skin: Skin is warm and dry. No rash noted. No erythema.   Psychiatric: Her behavior is normal. Judgment normal.      I have reexamined the patient and the results are consistent with the previously documented exam. Vargas Mejias MD     RECENT LABS:  Hematology WBC   Date Value Ref Range Status   11/06/2023 5.62 3.40 - 10.80 10*3/mm3 Final   06/14/2021 7.63 3.40 - 10.80 10*3/mm3 Final     RBC   Date Value Ref Range Status   11/06/2023 4.76 3.77 - 5.28 10*6/mm3 Final   06/14/2021 4.47 3.77 - 5.28 10*6/mm3 Final     Hemoglobin   Date Value Ref Range Status   11/06/2023 14.6 12.0 - 15.9 g/dL Final     Hematocrit   Date Value Ref Range Status   11/06/2023 44.1 34.0 - 46.6 % Final     Platelets   Date Value Ref Range Status   11/06/2023 287 140 - 450 10*3/mm3 Final              Lab Results   Component Value Date    IRON 104 11/06/2023    TIBC 353 11/06/2023    FERRITIN 119.00 11/06/2023   SAT 29%    Lab Results   Component Value Date    SYTNAZSN49 776 11/06/2023       Lab Results   Component Value Date    GLUCOSE 88 11/06/2023    BUN 14 11/06/2023    CREATININE 0.64 11/06/2023    EGFRIFNONA 82 08/12/2021    EGFRIFAFRI 119 06/14/2021    BCR 21.9 11/06/2023    CO2 26.5 11/06/2023    CALCIUM 9.8 11/06/2023    PROTENTOTREF 6.8 06/14/2021    ALBUMIN 4.3 11/06/2023    LABIL2 2.2  06/14/2021    AST 24 11/06/2023    ALT 23 11/06/2023     EGD PATHOLOGY 7/30/2021  Final Diagnosis   1. Antrum, Biopsy: Benign gastric mucosa with                A. Mild chronic inflammation.               B. Features suggestive of chemical gastritis.   C. No intestinal metaplasia.               D. No Helicobacter pylori.      2. Gastroesophageal Junction, Biopsy: Benign squamous and gastric mucosa with                A. Chronic inflammation of the gastric mucosa.               B. No intestinal metaplasia.               C. No Helicobacter pylori.       IMAGING:  CT OF THE CHEST ABDOMEN AND PELVIS WITH CONTRAST 1/25/2021   CONCLUSION: Stable CT scan of the chest, no acute intrathoracic  abnormality or suspicious pulmonary lesion has developed.  CONCLUSION: Stable CT scan of the abdomen and pelvis, specifically no  change in the small bowel anastomosis. No indication of local tumor  recurrence nor visceral metastasis.      Assessment & Plan      1.  Gastrointestinal stromal tumor of the small bowel resected 2/22/2016 by Dr. Meraz with good margins.  The tumor appeared to be low-grade with good prognostic features.  Patient continues to do well now over 7 years out from surgery.  2.  Iron deficiency anemia due to GI blood loss.  As noted above, her EGD showed gastritis.  She had been taking nonsteroidals and also had been donating blood.  She since has stopped taking Naprosyn and we instructed her to stop donating blood also.  She is tolerating oral iron supplementation in the form of [place] vitamins. As noted above, her iron studies from last week show that she is still replete with iron.    Plan    1.   We reviewed the results of the labs from 11/6/2023 with the patient and explained that she is still replete with iron.  2.   We encouraged her to continue to avoid all nonsteroidal anti-inflammatory drugs.  3.  We instructed her to continue the [place] vitamins with iron 1  tablet daily  3.  MD follow-up with  labs in 12 months                    11/13/2023      CC:

## 2023-11-28 ENCOUNTER — PATIENT MESSAGE (OUTPATIENT)
Dept: GASTROENTEROLOGY | Facility: CLINIC | Age: 73
End: 2023-11-28
Payer: MEDICARE

## 2023-11-28 DIAGNOSIS — K22.4 ESOPHAGEAL SPASM: ICD-10-CM

## 2023-11-28 DIAGNOSIS — R07.89 ATYPICAL CHEST PAIN: ICD-10-CM

## 2023-11-28 DIAGNOSIS — K21.9 GASTROESOPHAGEAL REFLUX DISEASE WITHOUT ESOPHAGITIS: Primary | ICD-10-CM

## 2023-11-29 RX ORDER — PANTOPRAZOLE SODIUM 20 MG/1
20 TABLET, DELAYED RELEASE ORAL DAILY
Qty: 90 TABLET | Refills: 2 | Status: SHIPPED | OUTPATIENT
Start: 2023-11-29

## 2023-11-29 NOTE — TELEPHONE ENCOUNTER
From: Ashley Comer  To: Marylou Johnson  Sent: 11/28/2023 11:52 AM EST  Subject: Follow up regarding Pantoprazole    When I was in your office, we discussed the possibility of reducing the dosage of Pantoprazole following my visit on Nov 13 with Dr Mejias. My labs are good and I am asymptomatic with my med. What are your thoughts?

## 2024-01-03 ENCOUNTER — PRE-ADMISSION TESTING (OUTPATIENT)
Dept: PREADMISSION TESTING | Facility: HOSPITAL | Age: 74
End: 2024-01-03
Payer: MEDICARE

## 2024-01-03 VITALS
SYSTOLIC BLOOD PRESSURE: 145 MMHG | HEART RATE: 76 BPM | TEMPERATURE: 98.1 F | OXYGEN SATURATION: 95 % | WEIGHT: 159.8 LBS | DIASTOLIC BLOOD PRESSURE: 78 MMHG | RESPIRATION RATE: 16 BRPM | BODY MASS INDEX: 29.4 KG/M2 | HEIGHT: 62 IN

## 2024-01-03 LAB
ANION GAP SERPL CALCULATED.3IONS-SCNC: 11 MMOL/L (ref 5–15)
BUN SERPL-MCNC: 18 MG/DL (ref 8–23)
BUN/CREAT SERPL: 25 (ref 7–25)
CALCIUM SPEC-SCNC: 10.4 MG/DL (ref 8.6–10.5)
CHLORIDE SERPL-SCNC: 102 MMOL/L (ref 98–107)
CO2 SERPL-SCNC: 27 MMOL/L (ref 22–29)
CREAT SERPL-MCNC: 0.72 MG/DL (ref 0.57–1)
DEPRECATED RDW RBC AUTO: 40.4 FL (ref 37–54)
EGFRCR SERPLBLD CKD-EPI 2021: 87.9 ML/MIN/1.73
ERYTHROCYTE [DISTWIDTH] IN BLOOD BY AUTOMATED COUNT: 12 % (ref 12.3–15.4)
GLUCOSE SERPL-MCNC: 104 MG/DL (ref 65–99)
HCT VFR BLD AUTO: 42.2 % (ref 34–46.6)
HGB BLD-MCNC: 14.1 G/DL (ref 12–15.9)
MCH RBC QN AUTO: 30.7 PG (ref 26.6–33)
MCHC RBC AUTO-ENTMCNC: 33.4 G/DL (ref 31.5–35.7)
MCV RBC AUTO: 91.7 FL (ref 79–97)
PLATELET # BLD AUTO: 318 10*3/MM3 (ref 140–450)
PMV BLD AUTO: 8.9 FL (ref 6–12)
POTASSIUM SERPL-SCNC: 4.4 MMOL/L (ref 3.5–5.2)
RBC # BLD AUTO: 4.6 10*6/MM3 (ref 3.77–5.28)
SODIUM SERPL-SCNC: 140 MMOL/L (ref 136–145)
WBC NRBC COR # BLD AUTO: 7.6 10*3/MM3 (ref 3.4–10.8)

## 2024-01-03 PROCEDURE — 80048 BASIC METABOLIC PNL TOTAL CA: CPT

## 2024-01-03 PROCEDURE — 36415 COLL VENOUS BLD VENIPUNCTURE: CPT

## 2024-01-03 PROCEDURE — 85027 COMPLETE CBC AUTOMATED: CPT

## 2024-01-03 ASSESSMENT — KOOS JR
KOOS JR SCORE: 73.34
KOOS JR SCORE: 5

## 2024-01-03 NOTE — DISCHARGE INSTRUCTIONS
Take the following medications the morning of surgery:    PANTOPRAZOLE    ARRIVE AT 0600.    If you are on prescription narcotic pain medication to control your pain you may also take that medication the morning of surgery.    General Instructions:  Do not eat solid food after midnight the night before surgery.  You may drink clear liquids day of surgery but must stop at least one hour before your hospital arrival time.  It is beneficial for you to have a clear drink that contains carbohydrates the day of surgery.  We suggest a 12 to 20 ounce bottle of Gatorade or Powerade for non-diabetic patients or a 12 to 20 ounce bottle of G2 or Powerade Zero for diabetic patients. (Pediatric patients, are not advised to drink a 12 to 20 ounce carbohydrate drink)    Clear liquids are liquids you can see through.  Nothing red in color.     Plain water                               Sports drinks  Sodas                                   Gelatin (Jell-O)  Fruit juices without pulp such as white grape juice and apple juice  Popsicles that contain no fruit or yogurt  Tea or coffee (no cream or milk added)  Gatorade / Powerade  G2 / Powerade Zero    Infants may have breast milk up to four hours before surgery.  Infants drinking formula may drink formula up to six hours before surgery.   Patients who avoid smoking, chewing tobacco and alcohol for 4 weeks prior to surgery have a reduced risk of post-operative complications.  Quit smoking as many days before surgery as you can.  Do not smoke, use chewing tobacco or drink alcohol the day of surgery.   If applicable bring your C-PAP/ BI-PAP machine in with you to preop day of surgery.  Bring any papers given to you in the doctor’s office.  Wear clean comfortable clothes.  Do not wear contact lenses, false eyelashes or make-up.  Bring a case for your glasses.   Bring crutches or walker if applicable.  Remove all piercings.  Leave jewelry and any other valuables at home.  Hair extensions  with metal clips must be removed prior to surgery.  The Pre-Admission Testing nurse will instruct you to bring medications if unable to obtain an accurate list in Pre-Admission Testing.        If you were given a blood bank ID arm band remember to bring it with you the day of surgery.    Preventing a Surgical Site Infection:  For 2 to 3 days before surgery, avoid shaving with a razor because the razor can irritate skin and make it easier to develop an infection.    Any areas of open skin can increase the risk of a post-operative wound infection by allowing bacteria to enter and travel throughout the body.  Notify your surgeon if you have any skin wounds / rashes even if it is not near the expected surgical site.  The area will need assessed to determine if surgery should be delayed until it is healed.  The night prior to surgery shower using a fresh bar of anti-bacterial soap (such as Dial) and clean washcloth.  Sleep in a clean bed with clean clothing.  Do not allow pets to sleep with you.  Shower on the morning of surgery using a fresh bar of anti-bacterial soap (such as Dial) and clean washcloth.  Dry with a clean towel and dress in clean clothing.  Ask your surgeon if you will be receiving antibiotics prior to surgery.  Make sure you, your family, and all healthcare providers clean their hands with soap and water or an alcohol based hand  before caring for you or your wound.    Day of surgery:  Your arrival time is approximately two hours before your scheduled surgery time.  Upon arrival, a Pre-op nurse and Anesthesiologist will review your health history, obtain vital signs, and answer questions you may have.  The only belongings needed at this time will be a list of your home medications and if applicable your C-PAP/BI-PAP machine.  A Pre-op nurse will start an IV and you may receive medication in preparation for surgery, including something to help you relax.     Please be aware that surgery does  come with discomfort.  We want to make every effort to control your discomfort so please discuss any uncontrolled symptoms with your nurse.   Your doctor will most likely have prescribed pain medications.      If you are going home after surgery you will receive individualized written care instructions before being discharged.  A responsible adult must drive you to and from the hospital on the day of your surgery and stay with you for 24 hours.  Discharge prescriptions can be filled by the hospital pharmacy during regular pharmacy hours.  If you are having surgery late in the day/evening your prescription may be e-prescribed to your pharmacy.  Please verify your pharmacy hours or chose a 24 hour pharmacy to avoid not having access to your prescription because your pharmacy has closed for the day.    If you are staying overnight following surgery, you will be transported to your hospital room following the recovery period.  [facility] has all private rooms.    If you have any questions please call Pre-Admission Testing at [phone].  Deductibles and co-payments are collected on the day of service. Please be prepared to pay the required co-pay, deductible or deposit on the day of service as defined by your plan.    Call your surgeon immediately if you experience any of the following symptoms:  Sore Throat  Shortness of Breath or difficulty breathing  Cough  Chills  Body soreness or muscle pain  Headache  Fever  New loss of taste or smell  Do not arrive for your surgery ill.  Your procedure will need to be rescheduled to another time.  You will need to call your physician before the day of surgery to avoid any unnecessary exposure to hospital staff as well as other patients.      CHLORHEXIDINE CLOTH INSTRUCTIONS  The morning of surgery follow these instructions using the Chlorhexidine cloths you've been given.  These steps reduce bacteria on the body.  Do not use the cloths near your eyes, ears  mouth, genitalia or on open wounds.  Throw the cloths away after use but do not try to flush them down a toilet.      Open and remove one cloth at a time from the package.    Leave the cloth unfolded and begin the bathing.  Massage the skin with the cloths using gentle pressure to remove bacteria.  Do not scrub harshly.   Follow the steps below with one 2% CHG cloth per area (6 total cloths).  One cloth for neck, shoulders and chest.  One cloth for both arms, hands, fingers and underarms (do underarms last).  One cloth for the abdomen followed by groin.  One cloth for right leg and foot including between the toes.  One cloth for left leg and foot including between the toes.  The last cloth is to be used for the back of the neck, back and buttocks.    Allow the CHG to air dry 3 minutes on the skin which will give it time to work and decrease the chance of irritation.  The skin may feel sticky until it is dry.  Do not rinse with water or any other liquid or you will lose the beneficial effects of the CHG.  If mild skin irritation occurs, do rinse the skin to remove the CHG.  Report this to the nurse at time of admission.  Do not apply lotions, creams, ointments, deodorants or perfumes after using the clothes. Dress in clean clothes before coming to the hospital.

## 2024-01-10 ENCOUNTER — TELEPHONE (OUTPATIENT)
Dept: ORTHOPEDIC SURGERY | Facility: HOSPITAL | Age: 74
End: 2024-01-10
Payer: MEDICARE

## 2024-01-10 NOTE — TELEPHONE ENCOUNTER
Risk Factor yes no   Age >75  X   BMI <20 >40  X   Patient History     Chronic Pain (2 or more meds/Pain Management)  X   ETOH (more than 3 drinks Daily)  X   Uncontrolled Depression/Bipolar/Schizoaffective Disorder  X   Arrhythmias--  X   Stent placement/MI  X   DVT/PE  X   Pacemaker  X   HTN (uncontrolled or requiring more than 2 medications)  X   CHF/Retained fluids/Edema X    Stroke with Residual   X   COPD/Asthma  X   CLAUDINE--Non-compliant with CPAP  X   Diabetes (on insulin or more than 2 meds)         A1C:  X   BPH/Urinary retention (on medication)  X   CKD  X   Home environment and support     Current ambulation status (use of cane, walker, W/C, Multiple falls/weakness)  X   Stairs to enter and throughout home X    Lives Alone X    Doesn't have support at home  X   Outpatient Screening Assessment    Home needs: (Walker/BSC):  Has walker  ? Steps 2 steps   Caregiver 24-48hrs post-discharge: Daughter    Discharge Plan:   [facility]    Prescriptions: Meds to bed    Home medications:   [] Blood thinner/anti-coag therapy--   ? BPH or diuretic--HCTZ  [] BP meds--   [] Pain/Anti-inflammatories--   Pre-op Education:  Educate patient on spinal anesthesia/pain control:  ? patient verbalize understanding    Educate patient on hospital course/timeline:  ?  patient verbalize understanding    Joint Care Class:  ?  yes [] no  Notes:

## 2024-01-11 ENCOUNTER — OFFICE VISIT (OUTPATIENT)
Dept: ORTHOPEDIC SURGERY | Facility: CLINIC | Age: 74
End: 2024-01-11
Payer: MEDICARE

## 2024-01-11 VITALS
SYSTOLIC BLOOD PRESSURE: 128 MMHG | HEIGHT: 62 IN | TEMPERATURE: 98.4 F | BODY MASS INDEX: 29.04 KG/M2 | WEIGHT: 157.8 LBS | DIASTOLIC BLOOD PRESSURE: 82 MMHG

## 2024-01-11 DIAGNOSIS — R52 PAIN: Primary | ICD-10-CM

## 2024-01-11 NOTE — H&P (VIEW-ONLY)
Patient: Ashley Comer    Date of Admission: 1/19/2024    YOB: 1950    Medical Record Number: [id number]    Admitting Physician: Dr. Daniele Mcelroy    Reason for Admission: End Stage Left Knee OA    History of Present Illness: 73 y.o. female presents with severe end stage knee osteoarthritis which has not been responsive to the full compliment of conservative measures. Despite conservative attempts, there is still severe, constant activity-limiting pain. Given the severity of the pain, the patient has elected to proceed with knee replacement.    Allergies:   Allergies   Allergen Reactions    Penicillins Rash     Rash over whole body         Current Medications:  Home Medications:    Current Outpatient Medications on File Prior to Visit   Medication Sig    acetaminophen (TYLENOL) 500 MG tablet Take 2 tablets by mouth Every 6 (Six) Hours As Needed for Mild Pain.    Calcium Citrate 250 MG tablet     cetirizine (zyrTEC) 10 MG tablet Take 1 tablet by mouth Daily As Needed.    cholecalciferol (VITAMIN D3) 25 MCG (1000 UT) tablet     ezetimibe (Zetia) 10 MG tablet Take 1 tablet by mouth Daily.    fluticasone (FLONASE) 50 MCG/ACT nasal spray 2 sprays into the nostril(s) as directed by provider Daily As Needed.    hydroCHLOROthiazide (HYDRODIURIL) 12.5 MG tablet TAKE ONE TABLET BY MOUTH DAILY    ibandronate (BONIVA) 150 MG tablet Take 1 tablet by mouth Every 30 (Thirty) Days. DUE ON SEPT 24/2022    multivitamin with minerals (HAIR SKIN AND NAILS FORMULA PO) Take 2 tablets by mouth Daily. Gummies  HOLD PER MD INSTR    pantoprazole (Protonix) 20 MG EC tablet Take 1 tablet by mouth Daily.    Pediatric Multivitamins-Iron (Flintstones Complete) 18 MG chewable tablet chewable tablet HOLD PER MD INSTR    VITAMIN B COMPLEX-C PO Take 1 tablet by mouth Daily. HOLD PER MD INSTR    Red Yeast Rice 600 MG capsule      No current facility-administered medications on file prior to visit.     PRN Meds:.    PMH:     Past Medical  History:   Diagnosis Date    Allergic 1970    pcn    Anemia     Arthritis 2006    Arthritis of neck 2017    Bradycardia     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    Cataract     removed 2014    Colon polyp 02/22/2016    As report to me by Dr Rodriguez    Degenerative disc disease, cervical     Disease of thyroid gland     THYROID NODULES    Edema     Esophageal reflux     GERD (gastroesophageal reflux disease)     Headache     History of GI bleed     History of transfusion 02/2016    6 UNITS    Hyperlipidemia     Knee swelling 2006    Left knee pain     Migraine     Neck pain     Osteopenia     Osteoporosis     Other chest pain     RESOLVED    Scoliosis     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    Urinary tract infection 1970       PF/Surg/Soc Hx:     Past Surgical History:   Procedure Laterality Date    APPENDECTOMY  02/23/2016    CATARACT EXTRACTION Bilateral 2014    COLONOSCOPY N/A 07/28/2017    Procedure: COLONOSCOPY INTO CECUM WITH POLYPECTOMY;  Surgeon: Mini Meraz MD;  Location: [facility] ENDOSCOPY;  Service:     COLONOSCOPY N/A 07/30/2021    Procedure: COLONOSCOPY;  Surgeon: Mini Meraz MD;  Location: [facility] MAIN OR;  Service: Gastroenterology;  Laterality: N/A;  normal    ENDOSCOPY N/A 07/28/2017    Procedure: ESOPHAGOGASTRODUODENOSCOPY WITH BIOPSIES;  Surgeon: Mini Meraz MD;  Location: [facility] ENDOSCOPY;  Service:     ENDOSCOPY N/A 07/30/2021    Procedure: ESOPHAGOGASTRODUODENOSCOPY;  Surgeon: Mini Meraz MD;  Location: [facility] MAIN OR;  Service: Gastroenterology;  Laterality: N/A;  gastritis, esophagitis    ENDOSCOPY N/A 6/19/2023    Procedure: ESOPHAGOGASTRODUODENOSCOPY WITH BIOPSY;  Surgeon: Mookie Hawkins MD;  Location: [facility] MAIN OR;  Service: Gastroenterology;  Laterality: N/A;  gastric polyps    FOOT SURGERY Right 1954    DEBRIDMENT    JOINT REPLACEMENT  9/30/2022    LAPAROSCOPIC APPENDECTOMY       SMALL INTESTINE SURGERY  2016    for GIST     THYROID SURGERY Left     thyroid lobectomy     TOTAL KNEE ARTHROPLASTY Right 2022    Procedure: TOTAL KNEE ARTHROPLASTY;  Surgeon: Daniele Mcelroy MD;  [facility] [place] [facility]  Service: Orthopedics;  Laterality: Right;    VITRECTOMY Right 2015        Social History     Occupational History    Occupation: RN      Employer: [employer]     Employer: RETIRED   Tobacco Use    Smoking status: Former     Packs/day: 1.00     Years: 25.00     Additional pack years: 0.00     Total pack years: 25.00     Types: Cigarettes     Start date: 1968     Quit date: 1992     Years since quittin.0    Smokeless tobacco: Never   Vaping Use    Vaping Use: Never used   Substance and Sexual Activity    Alcohol use: Yes     Comment: occas    Drug use: No    Sexual activity: Not Currently     Partners: Male      Social History     Social History Narrative    The patient'  was diagnosed with esophageal cancer on upper GI endoscopy by Dr. Jose Enrique Yuan.         Family History   Problem Relation Age of Onset    Dementia Mother     Hypertension Mother     Tuberculosis Mother     Pneumonia Mother     Broken bones Mother         Wrist, hip    Osteoporosis Mother     Prostate cancer Father     Tuberculosis Father     Cancer Father         prostate w/bone mets    Ulcerative colitis Sister     Cancer Sister         Throat    Breast cancer Sister     Cancer Sister         Malig Teratoma    Cancer Sister         Breast    Cancer Sister         Breast    Cancer Brother         Prostate    Breast cancer Maternal Aunt     Cancer Maternal Aunt         Breast    Lung cancer Paternal Aunt     Brain cancer Paternal Aunt     Breast cancer Paternal Aunt     Cancer Paternal Aunt         Breast    Cancer Paternal Aunt         Lung    Cancer Paternal Aunt         Lung    Colon polyps Maternal Grandmother     Malig Hyperthermia Neg Hx     Colon cancer Neg Hx     Crohn's disease  "Neg Hx     Irritable bowel syndrome Neg Hx          Review of Systems:   A 14 point review of systems was performed, pertinent positives discussed above, all other systems are negative    Physical Exam: 73 y.o. female  Vital Signs :   Vitals:    01/11/24 1549   BP: 128/82   BP Location: Left arm   Patient Position: Sitting   Cuff Size: Large Adult   Temp: 98.4 °F (36.9 °C)   TempSrc: Temporal   Weight: 71.6 kg (157 lb 12.8 oz)   Height: 157.5 cm (62.01\")   PainSc: 0-No pain   PainLoc: Knee     General: Alert and Oriented x 3, No acute distress.  Psych: mood and affect appropriate; recent and remote memory intact  Eyes: conjunctivae clear; pupils equally round and reactive, sclerae anicteric  CV: no peripheral edema  Resp: normal respiratory effort  Skin: no rashes or wounds; normal turgor  Musculosketetal; pain and crepitance with knee range of motion  Vascular: palpable distal pulses    Xrays:  -3 views (AP, lateral, and sunrise) were reviewed demonstrating end-stage OA with bone on bone articulation.  -A full length AP xray was ordered and reviewed today for purposes of operative alignment demonstrating end stage arthritic findings. There are no previous full length films for review    Assessment:  End-stage Left knee osteoarthritis. Conservative measures have failed.      Plan:  The plan is to proceed with Left Total Knee Replacement. The patient voiced understanding of the risks, benefits, and alternative forms of treatment that were discussed with Dr Mcelroy at the time of scheduling.  Same day home health    Leona Munoz, APRN  1/11/2024         "

## 2024-01-11 NOTE — H&P
Patient: Ashley Comer    Date of Admission: 1/19/2024    YOB: 1950    Medical Record Number: [id number]    Admitting Physician: Dr. Daniele Mcelroy    Reason for Admission: End Stage Left Knee OA    History of Present Illness: 73 y.o. female presents with severe end stage knee osteoarthritis which has not been responsive to the full compliment of conservative measures. Despite conservative attempts, there is still severe, constant activity-limiting pain. Given the severity of the pain, the patient has elected to proceed with knee replacement.    Allergies:   Allergies   Allergen Reactions    Penicillins Rash     Rash over whole body         Current Medications:  Home Medications:    Current Outpatient Medications on File Prior to Visit   Medication Sig    acetaminophen (TYLENOL) 500 MG tablet Take 2 tablets by mouth Every 6 (Six) Hours As Needed for Mild Pain.    Calcium Citrate 250 MG tablet     cetirizine (zyrTEC) 10 MG tablet Take 1 tablet by mouth Daily As Needed.    cholecalciferol (VITAMIN D3) 25 MCG (1000 UT) tablet     ezetimibe (Zetia) 10 MG tablet Take 1 tablet by mouth Daily.    fluticasone (FLONASE) 50 MCG/ACT nasal spray 2 sprays into the nostril(s) as directed by provider Daily As Needed.    hydroCHLOROthiazide (HYDRODIURIL) 12.5 MG tablet TAKE ONE TABLET BY MOUTH DAILY    ibandronate (BONIVA) 150 MG tablet Take 1 tablet by mouth Every 30 (Thirty) Days. DUE ON SEPT 24/2022    multivitamin with minerals (HAIR SKIN AND NAILS FORMULA PO) Take 2 tablets by mouth Daily. Gummies  HOLD PER MD INSTR    pantoprazole (Protonix) 20 MG EC tablet Take 1 tablet by mouth Daily.    Pediatric Multivitamins-Iron (Flintstones Complete) 18 MG chewable tablet chewable tablet HOLD PER MD INSTR    VITAMIN B COMPLEX-C PO Take 1 tablet by mouth Daily. HOLD PER MD INSTR    Red Yeast Rice 600 MG capsule      No current facility-administered medications on file prior to visit.     PRN Meds:.    PMH:     Past Medical  History:   Diagnosis Date    Allergic 1970    pcn    Anemia     Arthritis 2006    Arthritis of neck 2017    Bradycardia     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    Cataract     removed 2014    Colon polyp 02/22/2016    As report to me by Dr Rodriguez    Degenerative disc disease, cervical     Disease of thyroid gland     THYROID NODULES    Edema     Esophageal reflux     GERD (gastroesophageal reflux disease)     Headache     History of GI bleed     History of transfusion 02/2016    6 UNITS    Hyperlipidemia     Knee swelling 2006    Left knee pain     Migraine     Neck pain     Osteopenia     Osteoporosis     Other chest pain     RESOLVED    Scoliosis     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    Urinary tract infection 1970       PF/Surg/Soc Hx:     Past Surgical History:   Procedure Laterality Date    APPENDECTOMY  02/23/2016    CATARACT EXTRACTION Bilateral 2014    COLONOSCOPY N/A 07/28/2017    Procedure: COLONOSCOPY INTO CECUM WITH POLYPECTOMY;  Surgeon: Mini Meraz MD;  Location: [facility] ENDOSCOPY;  Service:     COLONOSCOPY N/A 07/30/2021    Procedure: COLONOSCOPY;  Surgeon: Mini Meraz MD;  [place] [facility] [place];  Service: Gastroenterology;  Laterality: N/A;  normal    ENDOSCOPY N/A 07/28/2017    Procedure: ESOPHAGOGASTRODUODENOSCOPY WITH BIOPSIES;  Surgeon: Mini Meraz MD;  Location: [facility] ENDOSCOPY;  Service:     ENDOSCOPY N/A 07/30/2021    Procedure: ESOPHAGOGASTRODUODENOSCOPY;  Surgeon: Mini Meraz MD;  [place] [facility] [place];  Service: Gastroenterology;  Laterality: N/A;  gastritis, esophagitis    ENDOSCOPY N/A 6/19/2023    Procedure: ESOPHAGOGASTRODUODENOSCOPY WITH BIOPSY;  Surgeon: Mookie Hawkins MD;  [place] [facility] [place];  Service: Gastroenterology;  Laterality: N/A;  gastric polyps    FOOT SURGERY Right 1954    DEBRIDMENT    JOINT REPLACEMENT  9/30/2022    LAPAROSCOPIC APPENDECTOMY       SMALL INTESTINE SURGERY  2016    for GIST     THYROID SURGERY Left     thyroid lobectomy     TOTAL KNEE ARTHROPLASTY Right 2022    Procedure: TOTAL KNEE ARTHROPLASTY;  Surgeon: Daniele Mcelroy MD;  Location: [facility];  Service: Orthopedics;  Laterality: Right;    VITRECTOMY Right 2015        Social History     Occupational History    Occupation: RN      Employer: [employer]     Employer: RETIRED   Tobacco Use    Smoking status: Former     Packs/day: 1.00     Years: 25.00     Additional pack years: 0.00     Total pack years: 25.00     Types: Cigarettes     Start date: 1968     Quit date: 1992     Years since quittin.0    Smokeless tobacco: Never   Vaping Use    Vaping Use: Never used   Substance and Sexual Activity    Alcohol use: Yes     Comment: occas    Drug use: No    Sexual activity: Not Currently     Partners: Male      Social History     Social History Narrative    The patient'  was diagnosed with esophageal cancer on upper GI endoscopy by Dr. Jose Enrique Yuan.         Family History   Problem Relation Age of Onset    Dementia Mother     Hypertension Mother     Tuberculosis Mother     Pneumonia Mother     Broken bones Mother         Wrist, hip    Osteoporosis Mother     Prostate cancer Father     Tuberculosis Father     Cancer Father         prostate w/bone mets    Ulcerative colitis Sister     Cancer Sister         Throat    Breast cancer Sister     Cancer Sister         Malig Teratoma    Cancer Sister         Breast    Cancer Sister         Breast    Cancer Brother         Prostate    Breast cancer Maternal Aunt     Cancer Maternal Aunt         Breast    Lung cancer Paternal Aunt     Brain cancer Paternal Aunt     Breast cancer Paternal Aunt     Cancer Paternal Aunt         Breast    Cancer Paternal Aunt         Lung    Cancer Paternal Aunt         Lung    Colon polyps Maternal Grandmother     Malig Hyperthermia Neg Hx     Colon cancer Neg Hx     Crohn's disease  "Neg Hx     Irritable bowel syndrome Neg Hx          Review of Systems:   A 14 point review of systems was performed, pertinent positives discussed above, all other systems are negative    Physical Exam: 73 y.o. female  Vital Signs :   Vitals:    01/11/24 1549   BP: 128/82   BP Location: Left arm   Patient Position: Sitting   Cuff Size: Large Adult   Temp: 98.4 °F (36.9 °C)   TempSrc: Temporal   Weight: 71.6 kg (157 lb 12.8 oz)   Height: 157.5 cm (62.01\")   PainSc: 0-No pain   PainLoc: Knee     General: Alert and Oriented x 3, No acute distress.  Psych: mood and affect appropriate; recent and remote memory intact  Eyes: conjunctivae clear; pupils equally round and reactive, sclerae anicteric  CV: no peripheral edema  Resp: normal respiratory effort  Skin: no rashes or wounds; normal turgor  Musculosketetal; pain and crepitance with knee range of motion  Vascular: palpable distal pulses    Xrays:  -3 views (AP, lateral, and sunrise) were reviewed demonstrating end-stage OA with bone on bone articulation.  -A full length AP xray was ordered and reviewed today for purposes of operative alignment demonstrating end stage arthritic findings. There are no previous full length films for review    Assessment:  End-stage Left knee osteoarthritis. Conservative measures have failed.      Plan:  The plan is to proceed with Left Total Knee Replacement. The patient voiced understanding of the risks, benefits, and alternative forms of treatment that were discussed with Dr Mcelroy at the time of scheduling.  Same day home health    Leona Munoz, APRN  1/11/2024         "

## 2024-01-19 ENCOUNTER — ANESTHESIA (OUTPATIENT)
Dept: PERIOP | Facility: HOSPITAL | Age: 74
End: 2024-01-19
Payer: MEDICARE

## 2024-01-19 ENCOUNTER — APPOINTMENT (OUTPATIENT)
Dept: GENERAL RADIOLOGY | Facility: HOSPITAL | Age: 74
End: 2024-01-19
Payer: MEDICARE

## 2024-01-19 ENCOUNTER — DOCUMENTATION (OUTPATIENT)
Dept: HOME HEALTH SERVICES | Facility: HOME HEALTHCARE | Age: 74
End: 2024-01-19
Payer: MEDICARE

## 2024-01-19 ENCOUNTER — HOME HEALTH ADMISSION (OUTPATIENT)
Dept: HOME HEALTH SERVICES | Facility: HOME HEALTHCARE | Age: 74
End: 2024-01-19
Payer: MEDICARE

## 2024-01-19 ENCOUNTER — HOSPITAL ENCOUNTER (OUTPATIENT)
Facility: HOSPITAL | Age: 74
Setting detail: HOSPITAL OUTPATIENT SURGERY
Discharge: HOME HEALTH | End: 2024-01-19
Attending: ORTHOPAEDIC SURGERY | Admitting: ORTHOPAEDIC SURGERY
Payer: MEDICARE

## 2024-01-19 ENCOUNTER — ANESTHESIA EVENT (OUTPATIENT)
Dept: PERIOP | Facility: HOSPITAL | Age: 74
End: 2024-01-19
Payer: MEDICARE

## 2024-01-19 VITALS
SYSTOLIC BLOOD PRESSURE: 121 MMHG | BODY MASS INDEX: 28.86 KG/M2 | HEART RATE: 65 BPM | RESPIRATION RATE: 18 BRPM | DIASTOLIC BLOOD PRESSURE: 72 MMHG | WEIGHT: 157.85 LBS | OXYGEN SATURATION: 92 % | TEMPERATURE: 97.5 F

## 2024-01-19 DIAGNOSIS — M17.12 PRIMARY OSTEOARTHRITIS OF LEFT KNEE: ICD-10-CM

## 2024-01-19 PROCEDURE — 25010000002 HYDROMORPHONE PER 4 MG: Performed by: NURSE ANESTHETIST, CERTIFIED REGISTERED

## 2024-01-19 PROCEDURE — 25010000002 SUGAMMADEX 200 MG/2ML SOLUTION: Performed by: NURSE ANESTHETIST, CERTIFIED REGISTERED

## 2024-01-19 PROCEDURE — C1776 JOINT DEVICE (IMPLANTABLE): HCPCS | Performed by: ORTHOPAEDIC SURGERY

## 2024-01-19 PROCEDURE — 25810000003 LACTATED RINGERS PER 1000 ML: Performed by: STUDENT IN AN ORGANIZED HEALTH CARE EDUCATION/TRAINING PROGRAM

## 2024-01-19 PROCEDURE — 25010000002 DEXAMETHASONE SODIUM PHOSPHATE 20 MG/5ML SOLUTION: Performed by: STUDENT IN AN ORGANIZED HEALTH CARE EDUCATION/TRAINING PROGRAM

## 2024-01-19 PROCEDURE — 25010000002 ONDANSETRON PER 1 MG: Performed by: NURSE ANESTHETIST, CERTIFIED REGISTERED

## 2024-01-19 PROCEDURE — 25010000002 VANCOMYCIN PER 500 MG: Performed by: NURSE PRACTITIONER

## 2024-01-19 PROCEDURE — 25010000002 EPINEPHRINE 1 MG/ML SOLUTION 30 ML VIAL: Performed by: ORTHOPAEDIC SURGERY

## 2024-01-19 PROCEDURE — 97530 THERAPEUTIC ACTIVITIES: CPT

## 2024-01-19 PROCEDURE — 25010000002 FENTANYL CITRATE (PF) 50 MCG/ML SOLUTION: Performed by: STUDENT IN AN ORGANIZED HEALTH CARE EDUCATION/TRAINING PROGRAM

## 2024-01-19 PROCEDURE — 25010000002 CEFAZOLIN IN DEXTROSE 2-4 GM/100ML-% SOLUTION: Performed by: NURSE PRACTITIONER

## 2024-01-19 PROCEDURE — 25010000002 KETOROLAC TROMETHAMINE PER 15 MG: Performed by: ORTHOPAEDIC SURGERY

## 2024-01-19 PROCEDURE — 25010000002 ROPIVACAINE PER 1 MG: Performed by: ORTHOPAEDIC SURGERY

## 2024-01-19 PROCEDURE — 73560 X-RAY EXAM OF KNEE 1 OR 2: CPT

## 2024-01-19 PROCEDURE — 25010000002 PROPOFOL 10 MG/ML EMULSION: Performed by: NURSE ANESTHETIST, CERTIFIED REGISTERED

## 2024-01-19 PROCEDURE — 97161 PT EVAL LOW COMPLEX 20 MIN: CPT

## 2024-01-19 PROCEDURE — 27447 TOTAL KNEE ARTHROPLASTY: CPT | Performed by: ORTHOPAEDIC SURGERY

## 2024-01-19 PROCEDURE — 25010000002 ROPIVACAINE PER 1 MG: Performed by: STUDENT IN AN ORGANIZED HEALTH CARE EDUCATION/TRAINING PROGRAM

## 2024-01-19 PROCEDURE — C1713 ANCHOR/SCREW BN/BN,TIS/BN: HCPCS | Performed by: ORTHOPAEDIC SURGERY

## 2024-01-19 PROCEDURE — 25010000002 MIDAZOLAM PER 1 MG: Performed by: STUDENT IN AN ORGANIZED HEALTH CARE EDUCATION/TRAINING PROGRAM

## 2024-01-19 PROCEDURE — 25010000002 MORPHINE PER 10 MG: Performed by: ORTHOPAEDIC SURGERY

## 2024-01-19 PROCEDURE — 27447 TOTAL KNEE ARTHROPLASTY: CPT | Performed by: NURSE PRACTITIONER

## 2024-01-19 PROCEDURE — 25010000002 PROPOFOL 200 MG/20ML EMULSION: Performed by: NURSE ANESTHETIST, CERTIFIED REGISTERED

## 2024-01-19 DEVICE — GENESIS II NON-POROUS TIBIAL                                    BASEPLATE SIZE 3 LEFT
Type: IMPLANTABLE DEVICE | Site: KNEE | Status: FUNCTIONAL
Brand: GENESIS II

## 2024-01-19 DEVICE — LEGION CRUCIATE RETAINING HIGH                                    FLEX HIGHLY CROSS LINKED                                    POLYETHYLENE SIZE 3-4 9MM
Type: IMPLANTABLE DEVICE | Site: KNEE | Status: FUNCTIONAL
Brand: LEGION

## 2024-01-19 DEVICE — LEGION NARROW CRUCIATE RETAINING                                    OXINIUM SIZE 5N LEFT
Type: IMPLANTABLE DEVICE | Site: KNEE | Status: FUNCTIONAL
Brand: LEGION

## 2024-01-19 DEVICE — IMPLANTABLE DEVICE: Type: IMPLANTABLE DEVICE | Status: FUNCTIONAL

## 2024-01-19 DEVICE — GEN II 7.5MM RESUR PAT 32MM
Type: IMPLANTABLE DEVICE | Site: KNEE | Status: FUNCTIONAL
Brand: GENESIS II

## 2024-01-19 DEVICE — KNOTLESS TISSUE CONTROL DEVICE, UNDYED UNIDIRECTIONAL (ANTIBACTERIAL) SYNTHETIC ABSORBABLE DEVICE
Type: IMPLANTABLE DEVICE | Site: KNEE | Status: FUNCTIONAL
Brand: STRATAFIX

## 2024-01-19 DEVICE — VIOLET ANTIBACTERIAL POLYDIOXANONE, KNOTLESS TISSUE CONTROL DEVICE
Type: IMPLANTABLE DEVICE | Site: KNEE | Status: FUNCTIONAL
Brand: STRATAFIX

## 2024-01-19 DEVICE — PALACOS® R IS A FAST-CURING, RADIOPAQUE, POLY(METHYL METHACRYLATE)-BASED BONE CEMENT.PALACOS ® R CONTAINS THE X-RAY CONTRAST MEDIUM ZIRCONIUM DIOXIDE. TO IMPROVE VISIBILITY IN THE SURGICAL FIELD PALACOS ® R HAS BEEN COLOURED WITH CHLOROPHYLL (E141). THE BONE CEMENT IS PREPARED DIRECTLY BEFORE USE BY MIXING A POLYMER POWDER COMPONENT WITH A LIQUID MONOMER COMPONENT. A DUCTILE DOUGH FORMS WHICH CURES WITHIN A FEW MINUTES.
Type: IMPLANTABLE DEVICE | Site: KNEE | Status: FUNCTIONAL
Brand: PALACOS®

## 2024-01-19 RX ORDER — FLUMAZENIL 0.1 MG/ML
0.2 INJECTION INTRAVENOUS AS NEEDED
Status: DISCONTINUED | OUTPATIENT
Start: 2024-01-19 | End: 2024-01-19 | Stop reason: HOSPADM

## 2024-01-19 RX ORDER — HYDROCODONE BITARTRATE AND ACETAMINOPHEN 7.5; 325 MG/1; MG/1
TABLET ORAL
Qty: 56 TABLET | Refills: 0 | Status: SHIPPED | OUTPATIENT
Start: 2024-01-19

## 2024-01-19 RX ORDER — LIDOCAINE HYDROCHLORIDE 10 MG/ML
0.5 INJECTION, SOLUTION INFILTRATION; PERINEURAL ONCE AS NEEDED
Status: DISCONTINUED | OUTPATIENT
Start: 2024-01-19 | End: 2024-01-19 | Stop reason: HOSPADM

## 2024-01-19 RX ORDER — HYDROCODONE BITARTRATE AND ACETAMINOPHEN 7.5; 325 MG/1; MG/1
1 TABLET ORAL EVERY 4 HOURS PRN
Status: DISCONTINUED | OUTPATIENT
Start: 2024-01-19 | End: 2024-01-19 | Stop reason: HOSPADM

## 2024-01-19 RX ORDER — ONDANSETRON 2 MG/ML
4 INJECTION INTRAMUSCULAR; INTRAVENOUS ONCE AS NEEDED
Status: DISCONTINUED | OUTPATIENT
Start: 2024-01-19 | End: 2024-01-19 | Stop reason: HOSPADM

## 2024-01-19 RX ORDER — ROCURONIUM BROMIDE 10 MG/ML
INJECTION, SOLUTION INTRAVENOUS AS NEEDED
Status: DISCONTINUED | OUTPATIENT
Start: 2024-01-19 | End: 2024-01-19 | Stop reason: SURG

## 2024-01-19 RX ORDER — MAGNESIUM HYDROXIDE 1200 MG/15ML
LIQUID ORAL AS NEEDED
Status: DISCONTINUED | OUTPATIENT
Start: 2024-01-19 | End: 2024-01-19 | Stop reason: HOSPADM

## 2024-01-19 RX ORDER — ONDANSETRON 4 MG/1
4 TABLET, ORALLY DISINTEGRATING ORAL EVERY 6 HOURS PRN
Status: DISCONTINUED | OUTPATIENT
Start: 2024-01-19 | End: 2024-01-19 | Stop reason: HOSPADM

## 2024-01-19 RX ORDER — VANCOMYCIN HYDROCHLORIDE 1 G/200ML
15 INJECTION, SOLUTION INTRAVENOUS ONCE
Status: COMPLETED | OUTPATIENT
Start: 2024-01-19 | End: 2024-01-19

## 2024-01-19 RX ORDER — HYDROMORPHONE HYDROCHLORIDE 1 MG/ML
0.25 INJECTION, SOLUTION INTRAMUSCULAR; INTRAVENOUS; SUBCUTANEOUS
Status: DISCONTINUED | OUTPATIENT
Start: 2024-01-19 | End: 2024-01-19 | Stop reason: HOSPADM

## 2024-01-19 RX ORDER — DROPERIDOL 2.5 MG/ML
0.62 INJECTION, SOLUTION INTRAMUSCULAR; INTRAVENOUS
Status: DISCONTINUED | OUTPATIENT
Start: 2024-01-19 | End: 2024-01-19 | Stop reason: HOSPADM

## 2024-01-19 RX ORDER — ASPIRIN 81 MG/1
81 TABLET ORAL EVERY 12 HOURS SCHEDULED
Status: CANCELLED | OUTPATIENT
Start: 2024-01-20

## 2024-01-19 RX ORDER — PREGABALIN 75 MG/1
150 CAPSULE ORAL ONCE
Status: COMPLETED | OUTPATIENT
Start: 2024-01-19 | End: 2024-01-19

## 2024-01-19 RX ORDER — ASPIRIN 81 MG/1
81 TABLET ORAL 2 TIMES DAILY
Status: DISCONTINUED | OUTPATIENT
Start: 2024-01-20 | End: 2024-01-19 | Stop reason: HOSPADM

## 2024-01-19 RX ORDER — EPHEDRINE SULFATE 50 MG/ML
5 INJECTION, SOLUTION INTRAVENOUS ONCE AS NEEDED
Status: DISCONTINUED | OUTPATIENT
Start: 2024-01-19 | End: 2024-01-19 | Stop reason: HOSPADM

## 2024-01-19 RX ORDER — LIDOCAINE HYDROCHLORIDE 20 MG/ML
INJECTION, SOLUTION INFILTRATION; PERINEURAL AS NEEDED
Status: DISCONTINUED | OUTPATIENT
Start: 2024-01-19 | End: 2024-01-19 | Stop reason: SURG

## 2024-01-19 RX ORDER — CEFAZOLIN SODIUM 2 G/100ML
2 INJECTION, SOLUTION INTRAVENOUS ONCE
Status: COMPLETED | OUTPATIENT
Start: 2024-01-19 | End: 2024-01-19

## 2024-01-19 RX ORDER — IPRATROPIUM BROMIDE AND ALBUTEROL SULFATE 2.5; .5 MG/3ML; MG/3ML
3 SOLUTION RESPIRATORY (INHALATION) ONCE AS NEEDED
Status: DISCONTINUED | OUTPATIENT
Start: 2024-01-19 | End: 2024-01-19 | Stop reason: HOSPADM

## 2024-01-19 RX ORDER — SODIUM CHLORIDE 0.9 % (FLUSH) 0.9 %
3-10 SYRINGE (ML) INJECTION AS NEEDED
Status: DISCONTINUED | OUTPATIENT
Start: 2024-01-19 | End: 2024-01-19 | Stop reason: HOSPADM

## 2024-01-19 RX ORDER — NALOXONE HCL 0.4 MG/ML
0.2 VIAL (ML) INJECTION AS NEEDED
Status: DISCONTINUED | OUTPATIENT
Start: 2024-01-19 | End: 2024-01-19 | Stop reason: HOSPADM

## 2024-01-19 RX ORDER — PROMETHAZINE HYDROCHLORIDE 25 MG/1
25 TABLET ORAL ONCE AS NEEDED
Status: DISCONTINUED | OUTPATIENT
Start: 2024-01-19 | End: 2024-01-19 | Stop reason: HOSPADM

## 2024-01-19 RX ORDER — SODIUM CHLORIDE 0.9 % (FLUSH) 0.9 %
3 SYRINGE (ML) INJECTION EVERY 12 HOURS SCHEDULED
Status: DISCONTINUED | OUTPATIENT
Start: 2024-01-19 | End: 2024-01-19 | Stop reason: HOSPADM

## 2024-01-19 RX ORDER — ASPIRIN 81 MG/1
TABLET ORAL
Qty: 64 TABLET | Refills: 0 | Status: SHIPPED | OUTPATIENT
Start: 2024-01-19

## 2024-01-19 RX ORDER — PROMETHAZINE HYDROCHLORIDE 25 MG/1
25 SUPPOSITORY RECTAL ONCE AS NEEDED
Status: DISCONTINUED | OUTPATIENT
Start: 2024-01-19 | End: 2024-01-19 | Stop reason: HOSPADM

## 2024-01-19 RX ORDER — ACETAMINOPHEN 325 MG/1
650 TABLET ORAL EVERY 6 HOURS PRN
Status: DISCONTINUED | OUTPATIENT
Start: 2024-01-19 | End: 2024-01-19 | Stop reason: HOSPADM

## 2024-01-19 RX ORDER — DEXAMETHASONE SODIUM PHOSPHATE 4 MG/ML
INJECTION, SOLUTION INTRA-ARTICULAR; INTRALESIONAL; INTRAMUSCULAR; INTRAVENOUS; SOFT TISSUE
Status: COMPLETED | OUTPATIENT
Start: 2024-01-19 | End: 2024-01-19

## 2024-01-19 RX ORDER — FENTANYL CITRATE 50 UG/ML
25 INJECTION, SOLUTION INTRAMUSCULAR; INTRAVENOUS
Status: DISCONTINUED | OUTPATIENT
Start: 2024-01-19 | End: 2024-01-19 | Stop reason: HOSPADM

## 2024-01-19 RX ORDER — CEFAZOLIN SODIUM 2 G/100ML
2 INJECTION, SOLUTION INTRAVENOUS EVERY 8 HOURS
Status: CANCELLED | OUTPATIENT
Start: 2024-01-19 | End: 2024-01-19

## 2024-01-19 RX ORDER — PROMETHAZINE HYDROCHLORIDE 25 MG/1
12.5 TABLET ORAL EVERY 4 HOURS PRN
Status: DISCONTINUED | OUTPATIENT
Start: 2024-01-19 | End: 2024-01-19 | Stop reason: HOSPADM

## 2024-01-19 RX ORDER — PROPOFOL 10 MG/ML
INJECTION, EMULSION INTRAVENOUS CONTINUOUS PRN
Status: DISCONTINUED | OUTPATIENT
Start: 2024-01-19 | End: 2024-01-19 | Stop reason: SURG

## 2024-01-19 RX ORDER — PROPOFOL 10 MG/ML
VIAL (ML) INTRAVENOUS AS NEEDED
Status: DISCONTINUED | OUTPATIENT
Start: 2024-01-19 | End: 2024-01-19 | Stop reason: SURG

## 2024-01-19 RX ORDER — SODIUM CHLORIDE, SODIUM LACTATE, POTASSIUM CHLORIDE, CALCIUM CHLORIDE 600; 310; 30; 20 MG/100ML; MG/100ML; MG/100ML; MG/100ML
9 INJECTION, SOLUTION INTRAVENOUS CONTINUOUS
Status: DISCONTINUED | OUTPATIENT
Start: 2024-01-19 | End: 2024-01-19 | Stop reason: HOSPADM

## 2024-01-19 RX ORDER — ONDANSETRON 2 MG/ML
INJECTION INTRAMUSCULAR; INTRAVENOUS AS NEEDED
Status: DISCONTINUED | OUTPATIENT
Start: 2024-01-19 | End: 2024-01-19 | Stop reason: SURG

## 2024-01-19 RX ORDER — HYDRALAZINE HYDROCHLORIDE 20 MG/ML
5 INJECTION INTRAMUSCULAR; INTRAVENOUS
Status: DISCONTINUED | OUTPATIENT
Start: 2024-01-19 | End: 2024-01-19 | Stop reason: HOSPADM

## 2024-01-19 RX ORDER — MIDAZOLAM HYDROCHLORIDE 1 MG/ML
1 INJECTION INTRAMUSCULAR; INTRAVENOUS
Status: DISCONTINUED | OUTPATIENT
Start: 2024-01-19 | End: 2024-01-19 | Stop reason: HOSPADM

## 2024-01-19 RX ORDER — DIPHENHYDRAMINE HYDROCHLORIDE 50 MG/ML
12.5 INJECTION INTRAMUSCULAR; INTRAVENOUS
Status: DISCONTINUED | OUTPATIENT
Start: 2024-01-19 | End: 2024-01-19 | Stop reason: HOSPADM

## 2024-01-19 RX ORDER — FENTANYL CITRATE 50 UG/ML
50 INJECTION, SOLUTION INTRAMUSCULAR; INTRAVENOUS ONCE AS NEEDED
Status: COMPLETED | OUTPATIENT
Start: 2024-01-19 | End: 2024-01-19

## 2024-01-19 RX ORDER — MELOXICAM 15 MG/1
15 TABLET ORAL DAILY
Qty: 14 TABLET | Refills: 0 | Status: SHIPPED | OUTPATIENT
Start: 2024-01-19

## 2024-01-19 RX ORDER — LABETALOL HYDROCHLORIDE 5 MG/ML
5 INJECTION, SOLUTION INTRAVENOUS
Status: DISCONTINUED | OUTPATIENT
Start: 2024-01-19 | End: 2024-01-19 | Stop reason: HOSPADM

## 2024-01-19 RX ORDER — TRANEXAMIC ACID 100 MG/ML
INJECTION, SOLUTION INTRAVENOUS AS NEEDED
Status: DISCONTINUED | OUTPATIENT
Start: 2024-01-19 | End: 2024-01-19 | Stop reason: SURG

## 2024-01-19 RX ORDER — HYDROCODONE BITARTRATE AND ACETAMINOPHEN 5; 325 MG/1; MG/1
1 TABLET ORAL ONCE AS NEEDED
Status: DISCONTINUED | OUTPATIENT
Start: 2024-01-19 | End: 2024-01-19 | Stop reason: HOSPADM

## 2024-01-19 RX ORDER — MELOXICAM 15 MG/1
15 TABLET ORAL ONCE
Status: COMPLETED | OUTPATIENT
Start: 2024-01-19 | End: 2024-01-19

## 2024-01-19 RX ORDER — ROPIVACAINE HYDROCHLORIDE 5 MG/ML
INJECTION, SOLUTION EPIDURAL; INFILTRATION; PERINEURAL
Status: COMPLETED | OUTPATIENT
Start: 2024-01-19 | End: 2024-01-19

## 2024-01-19 RX ADMIN — ROPIVACAINE HYDROCHLORIDE 14 ML: 5 INJECTION EPIDURAL; INFILTRATION; PERINEURAL at 07:03

## 2024-01-19 RX ADMIN — VANCOMYCIN HYDROCHLORIDE 1000 MG: 1 INJECTION, SOLUTION INTRAVENOUS at 07:07

## 2024-01-19 RX ADMIN — SODIUM CHLORIDE, POTASSIUM CHLORIDE, SODIUM LACTATE AND CALCIUM CHLORIDE: 600; 310; 30; 20 INJECTION, SOLUTION INTRAVENOUS at 09:37

## 2024-01-19 RX ADMIN — SUGAMMADEX 200 MG: 100 INJECTION, SOLUTION INTRAVENOUS at 09:34

## 2024-01-19 RX ADMIN — TRANEXAMIC ACID 1000 MG: 100 INJECTION INTRAVENOUS at 08:53

## 2024-01-19 RX ADMIN — SODIUM CHLORIDE, POTASSIUM CHLORIDE, SODIUM LACTATE AND CALCIUM CHLORIDE 9 ML/HR: 600; 310; 30; 20 INJECTION, SOLUTION INTRAVENOUS at 06:48

## 2024-01-19 RX ADMIN — ROCURONIUM BROMIDE 50 MG: 10 INJECTION, SOLUTION INTRAVENOUS at 08:04

## 2024-01-19 RX ADMIN — PREGABALIN 150 MG: 75 CAPSULE ORAL at 06:42

## 2024-01-19 RX ADMIN — MIDAZOLAM 1 MG: 1 INJECTION INTRAMUSCULAR; INTRAVENOUS at 07:01

## 2024-01-19 RX ADMIN — DEXAMETHASONE SODIUM PHOSPHATE 4 MG: 4 INJECTION, SOLUTION INTRAMUSCULAR; INTRAVENOUS at 07:03

## 2024-01-19 RX ADMIN — HYDROCODONE BITARTRATE AND ACETAMINOPHEN 1 TABLET: 7.5; 325 TABLET ORAL at 09:55

## 2024-01-19 RX ADMIN — HYDROMORPHONE HYDROCHLORIDE 0.25 MG: 1 INJECTION, SOLUTION INTRAMUSCULAR; INTRAVENOUS; SUBCUTANEOUS at 09:55

## 2024-01-19 RX ADMIN — MELOXICAM 15 MG: 15 TABLET ORAL at 06:42

## 2024-01-19 RX ADMIN — DEXAMETHASONE SODIUM PHOSPHATE 8 MG: 4 INJECTION, SOLUTION INTRAMUSCULAR; INTRAVENOUS at 08:18

## 2024-01-19 RX ADMIN — HYDROMORPHONE HYDROCHLORIDE 0.25 MG: 1 INJECTION, SOLUTION INTRAMUSCULAR; INTRAVENOUS; SUBCUTANEOUS at 10:00

## 2024-01-19 RX ADMIN — ONDANSETRON 4 MG: 2 INJECTION INTRAMUSCULAR; INTRAVENOUS at 08:53

## 2024-01-19 RX ADMIN — FENTANYL CITRATE 50 MCG: 50 INJECTION, SOLUTION INTRAMUSCULAR; INTRAVENOUS at 07:01

## 2024-01-19 RX ADMIN — PROPOFOL 150 MCG/KG/MIN: 10 INJECTION, EMULSION INTRAVENOUS at 08:12

## 2024-01-19 RX ADMIN — CEFAZOLIN SODIUM 2 G: 2 INJECTION, SOLUTION INTRAVENOUS at 07:52

## 2024-01-19 RX ADMIN — PROPOFOL 150 MG: 10 INJECTION, EMULSION INTRAVENOUS at 08:04

## 2024-01-19 RX ADMIN — LIDOCAINE HYDROCHLORIDE 60 MG: 20 INJECTION, SOLUTION INFILTRATION; PERINEURAL at 08:04

## 2024-01-19 NOTE — ANESTHESIA PROCEDURE NOTES
Airway  Urgency: elective    Date/Time: 1/19/2024 8:08 AM  Airway not difficult    General Information and Staff    Patient location during procedure: OR  CRNA/CAA: Evelyne Irby CRNA    Indications and Patient Condition  Indications for airway management: airway protection    Preoxygenated: yes  Mask difficulty assessment: 1 - vent by mask    Final Airway Details  Final airway type: endotracheal airway      Successful airway: ETT  Cuffed: yes   Successful intubation technique: direct laryngoscopy  Facilitating devices/methods: intubating stylet  Endotracheal tube insertion site: oral  Blade: Yolanda  Blade size: 3  ETT size (mm): 7.0  Cormack-Lehane Classification: grade IIa - partial view of glottis  Placement verified by: chest auscultation and capnometry   Measured from: lips  ETT/EBT  to lips (cm): 22  Number of attempts at approach: 1  Assessment: lips, teeth, and gum same as pre-op and atraumatic intubation

## 2024-01-19 NOTE — THERAPY EVALUATION
Patient Name: Ashley Comer  : 1950    MRN: [id number]                              Today's Date: 2024       Admit Date: 2024    Visit Dx:     ICD-10-CM ICD-9-CM   1. 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, malignant    Hoarseness of voice    Mixed hyperlipidemia    Rectal polyp    Neck pain    Multiple thyroid nodules    Spinal stenosis in cervical region    Cervical radiculopathy due to intervertebral disc disorder    GERD (gastroesophageal reflux disease)    Occult blood in stools    OA (osteoarthritis) of knee    Atypical chest pain    Esophageal spasm    Palpitations    Systolic murmur     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    Cataract     removed 2014    Colon polyp 2016    As report to me by Dr Rodriguez    Degenerative disc disease, cervical     Disease of thyroid gland     THYROID NODULES    Edema     Esophageal reflux     GERD (gastroesophageal reflux disease)     Headache     History of GI bleed     History of transfusion 2016    6 UNITS    Hyperlipidemia     Knee swelling     Left knee pain     Migraine     Neck pain     Osteopenia     Osteoporosis     Other chest pain     RESOLVED    Scoliosis     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    Urinary tract infection      Past Surgical History:   Procedure Laterality Date    APPENDECTOMY  2016    CATARACT EXTRACTION Bilateral     COLONOSCOPY N/A 2017    Procedure: COLONOSCOPY INTO CECUM WITH POLYPECTOMY;  Surgeon: Mini Meraz MD;  Location: [facility] ENDOSCOPY;  Service:     COLONOSCOPY N/A 2021    Procedure: COLONOSCOPY;  Surgeon: Mini Meraz,  MD;  Location[facility];  Service: Gastroenterology;  Laterality: N/A;  normal    ENDOSCOPY N/A 07/28/2017    Procedure: ESOPHAGOGASTRODUODENOSCOPY WITH BIOPSIES;  Surgeon: Mini Meraz MD;  Location: [facility] ENDOSCOPY;  Service:     ENDOSCOPY N/A 07/30/2021    Procedure: ESOPHAGOGASTRODUODENOSCOPY;  Surgeon: Mini Meraz MD;  Location: [facility];  Service: Gastroenterology;  Laterality: N/A;  gastritis, esophagitis    ENDOSCOPY N/A 6/19/2023    Procedure: ESOPHAGOGASTRODUODENOSCOPY WITH BIOPSY;  Surgeon: Mookie Hawkins MD;  Location: [facility];  Service: Gastroenterology;  Laterality: N/A;  gastric polyps    FOOT SURGERY Right 1954    DEBRIDMENT    JOINT REPLACEMENT  9/30/2022    LAPAROSCOPIC APPENDECTOMY      SMALL INTESTINE SURGERY  02/2016    for GIST     THYROID SURGERY Left 1993    thyroid lobectomy     TOTAL KNEE ARTHROPLASTY Right 09/30/2022    Procedure: TOTAL KNEE ARTHROPLASTY;  Surgeon: Daniele Mcelroy MD;  Location: [facility] OR OSC;  Service: Orthopedics;  Laterality: Right;    VITRECTOMY Right 11/2015      General Information       Row Name 01/19/24 1153          Physical Therapy Time and Intention    Document Type evaluation  -CS     Mode of Treatment individual therapy;physical therapy  -CS       Row Name 01/19/24 1153          General Information    Patient Profile Reviewed yes  -CS     Prior Level of Function independent:;gait;transfer;bed mobility  -CS     Existing Precautions/Restrictions fall  -CS     Barriers to Rehab none identified  -CS       Row Name 01/19/24 1153          Living Environment    People in Home alone  dtr plans to stay to assist  -CS       Row Name 01/19/24 1153          Home Main Entrance    Number of Stairs, Main Entrance two  -CS     Stair Railings, Main Entrance railings safe and in good condition  -CS       Row Name 01/19/24 1153          Stairs Within Home, Primary    Number of Stairs, Within Home, Primary none  -CS       Row Name 01/19/24 115           Cognition    Orientation Status (Cognition) oriented x 4  -       Row Name 01/19/24 1153          Safety Issues, Functional Mobility    Impairments Affecting Function (Mobility) pain;strength  -CS               User Key  (r) = Recorded By, (t) = Taken By, (c) = Cosigned By      Initials Name Provider Type    CS Kathryn Clinton, PT Physical Therapist                   Mobility       Row Name 01/19/24 1153          Bed Mobility    Bed Mobility supine-sit;sit-supine  -CS     Supine-Sit Towns (Bed Mobility) supervision  -CS     Sit-Supine [place]s (Bed Mobility) supervision  -       Row Name 01/19/24 1153          Sit-Stand Transfer    Sit-Stand [place]s (Transfers) standby assist  -CS     Assistive Device (Sit-Stand Transfers) walker, front-wheeled  -CS       Row Name 01/19/24 1153          Gait/Stairs (Locomotion)    Towns Level (Gait) contact guard  -     Assistive Device (Gait) walker, front-wheeled  -CS     Distance in Feet (Gait) 75'  -CS     Deviations/Abnormal Patterns (Gait) emily decreased  -CS     Left Sided Gait Deviations weight shift ability decreased  -       Row Name 01/19/24 1153          Mobility    Extremity Weight-bearing Status left lower extremity  -CS     Left Lower Extremity (Weight-bearing Status) weight-bearing as tolerated (WBAT)  -CS               User Key  (r) = Recorded By, (t) = Taken By, (c) = Cosigned By      Initials Name Provider Type    Kathryn Gorman, PT Physical Therapist                   Obj/Interventions       Row Name 01/19/24 1154          Range of Motion Comprehensive    General Range of Motion bilateral lower extremity ROM WFL  -CS     Comment, General Range of Motion L LE able to reach 90 degree knee flexion  -       Row Name 01/19/24 1154          Strength Comprehensive (MMT)    General Manual Muscle Testing (MMT) Assessment other (see comments)  -CS     Comment, General Manual Muscle Testing (MMT) Assessment L LE limited  secondary to post-op; R LE WFL  -CS       Row Name 01/19/24 1154          Motor Skills    Therapeutic Exercise other (see comments)  5 reps L TKA protocol  -               User Key  (r) = Recorded By, (t) = Taken By, (c) = Cosigned By      Initials Name Provider Type    Kathryn Gorman, PT Physical Therapist                   Goals/Plan    No documentation.                  Clinical Impression       Row Name 01/19/24 1154          Pain    Pretreatment Pain Rating 0/10 - no pain  -CS       Row Name 01/19/24 1154          Plan of Care Review    Plan of Care Reviewed With patient  -CS     Outcome Evaluation Pt is a 74 y/o F POD 0 s/p L TKA. Pt reports she lives alone but daughter will stay to assist at D/C. Pt has 2 AC and is normally (I) with mobility at baseline. Pt presents to PT with expected post-op pain and weakness. Pt completed TKA protocol x 10 and demonstrated good quad control and ROM. Pt performed supine to/from sit, STS transfer, and ambulated 75' c RW requiring SBA/CGA. Pt demo's a slow pace but no LOB or buckling noted. Pt completed MIP to ensure safety with stair navigation. PT provided education regarding HEP, home safety, and post-op care. Pt is safe to D/C home today with assist and f/u with HHPT.  -       Row Name 01/19/24 1154          Therapy Assessment/Plan (PT)    Criteria for Skilled Interventions Met (PT) yes;meets criteria  -     Therapy Frequency (PT) evaluation only  -       Row Name 01/19/24 1154          Positioning and Restraints    Pre-Treatment Position in bed  -CS     Post Treatment Position bed  -CS     In Bed supine;call light within reach;encouraged to call for assist;with nsg  -               User Key  (r) = Recorded By, (t) = Taken By, (c) = Cosigned By      Initials Name Provider Type    Kathryn Gorman, SIMBA Physical Therapist                   Outcome Measures       Row Name 01/19/24 1155          How much help from another person do you currently need...     Turning from your back to your side while in flat bed without using bedrails? 4  -CS     Moving from lying on back to sitting on the side of a flat bed without bedrails? 4  -CS     Moving to and from a bed to a chair (including a wheelchair)? 4  -CS     Standing up from a chair using your arms (e.g., wheelchair, bedside chair)? 4  -CS     Climbing 3-5 steps with a railing? 3  -CS     To walk in hospital room? 4  -CS     AM-PAC 6 Clicks Score (PT) 23  -CS     Highest Level of Mobility Goal 7 --> Walk 25 feet or more  -CS       Row Name 01/19/24 1157          Functional Assessment    Outcome Measure Options AM-PAC 6 Clicks Basic Mobility (PT)  -               User Key  (r) = Recorded By, (t) = Taken By, (c) = Cosigned By      Initials Name Provider Type    CS Kathryn Clinton PT Physical Therapist                                 Physical Therapy Education       Title: PT OT SLP Therapies (Done)       Topic: Physical Therapy (Done)       Point: Mobility training (Done)       Learning Progress Summary             Patient Acceptance, E,TB, VU,DU by  at 1/19/2024 1157                         Point: Home exercise program (Done)       Learning Progress Summary             Patient Acceptance, E,TB, VU,DU by  at 1/19/2024 1157                         Point: Body mechanics (Done)       Learning Progress Summary             Patient Acceptance, E,TB, VU,DU by  at 1/19/2024 1157                         Point: Precautions (Done)       Learning Progress Summary             Patient Acceptance, E,TB, VU,DU by  at 1/19/2024 1157                                         User Key       Initials Effective Dates Name Provider Type Discipline     09/22/22 -  Kathryn Clinton, SIMBA Physical Therapist PT                  PT Recommendation and Plan     Plan of Care Reviewed With: patient  Outcome Evaluation: Pt is a 74 y/o F POD 0 s/p L TKA. Pt reports she lives alone but daughter will stay to assist at D/C. Pt has 2 AC and is  normally (I) with mobility at baseline. Pt presents to PT with expected post-op pain and weakness. Pt completed TKA protocol x 10 and demonstrated good quad control and ROM. Pt performed supine to/from sit, STS transfer, and ambulated 75' c RW requiring SBA/CGA. Pt demo's a slow pace but no LOB or buckling noted. Pt completed MIP to ensure safety with stair navigation. PT provided education regarding HEP, home safety, and post-op care. Pt is safe to D/C home today with assist and f/u with HHPT.     Time Calculation:         PT Charges       Row Name 01/19/24 1157             Time Calculation    Start Time 1126  -CS      Stop Time 1139  -CS      Time Calculation (min) 13 min  -CS      PT Received On 01/19/24  -CS         Time Calculation- PT    Total Timed Code Minutes- PT 10 minute(s)  -CS         Timed Charges    86002 - PT Therapeutic Activity Minutes 10  -CS         Total Minutes    Timed Charges Total Minutes 10  -CS       Total Minutes 10  -CS                User Key  (r) = Recorded By, (t) = Taken By, (c) = Cosigned By      Initials Name Provider Type    CS Kathryn Clinton, PT Physical Therapist                  Therapy Charges for Today       Code Description Service Date Service Provider Modifiers Qty    09334748817 HC PT THERAPEUTIC ACT EA 15 MIN 1/19/2024 Kathryn Clinton, PT GP 1    58456283690 HC PT EVAL LOW COMPLEXITY 2 1/19/2024 Kathryn Clinton, PT GP 1            PT G-Codes  Outcome Measure Options: AM-PAC 6 Clicks Basic Mobility (PT)  AM-PAC 6 Clicks Score (PT): 23  PT Discharge Summary  Anticipated Discharge Disposition (PT): home with assist, home with home health    Kathryn Clinton PT  1/19/2024

## 2024-01-19 NOTE — ANESTHESIA PROCEDURE NOTES
Peripheral Block    Pre-sedation assessment completed: 1/19/2024 7:02 AM    Patient reassessed immediately prior to procedure    Patient location during procedure: holding area  Start time: 1/19/2024 7:03 AM  Stop time: 1/19/2024 7:05 AM  Reason for block: at surgeon's request and post-op pain management  Performed by  Anesthesiologist: Victor Manuel Sal MD  Preanesthetic Checklist  Completed: patient identified, IV checked, site marked, risks and benefits discussed, surgical consent, monitors and equipment checked, pre-op evaluation and timeout performed  Prep:  Pt Position: supine  Sterile barriers:cap, washed/disinfected hands, gloves, mask and alcohol skin prep  Prep: ChloraPrep  Patient monitoring: blood pressure monitoring, continuous pulse oximetry and EKG  Procedure    Sedation: yes  Performed under: local infiltration  Guidance:ultrasound guided    ULTRASOUND INTERPRETATION.  Using ultrasound guidance a 21 G gauge needle was placed in close proximity to the nerve, at which point, under ultrasound guidance anesthetic was injected in the area of the nerve and spread of the anesthesia was seen on ultrasound in close proximity thereto.  There were no abnormalities seen on ultrasound; a digital image was taken; and the patient tolerated the procedure with no complications. Images:still images obtained, printed/placed on chart    Laterality:left  Block Type:adductor canal block  Injection Technique:single-shot  Needle Type:echogenic and Tuohy  Needle Gauge:21 G  Resistance on Injection: none    Medications Used: dexamethasone (DECADRON) injection - Injection   4 mg - 1/19/2024 7:03:00 AM  ropivacaine (NAROPIN) 0.5 % injection - Injection   14 mL - 1/19/2024 7:03:00 AM      Post Assessment  Injection Assessment: negative aspiration for heme, no paresthesia on injection and incremental injection  Patient Tolerance:comfortable throughout block  Complications:no  Additional Notes  Ultrasound guidance used to  visualize nerve anatomy, guide needle placement and verify local anesthetic disbursement.

## 2024-01-19 NOTE — PLAN OF CARE
Goal Outcome Evaluation:  Plan of Care Reviewed With: patient           Outcome Evaluation: Pt is a 72 y/o F POD 0 s/p L TKA. Pt reports she lives alone but daughter will stay to assist at D/C. Pt has 2 AC and is normally (I) with mobility at baseline. Pt presents to PT with expected post-op pain and weakness. Pt completed TKA protocol x 10 and demonstrated good quad control and ROM. Pt performed supine to/from sit, STS transfer, and ambulated 75' c RW requiring SBA/CGA. Pt demo's a slow pace but no LOB or buckling noted. Pt completed MIP to ensure safety with stair navigation. PT provided education regarding HEP, home safety, and post-op care. Pt is safe to D/C home today with assist and f/u with HHPT.      Anticipated Discharge Disposition (PT): home with assist, home with home health

## 2024-01-19 NOTE — OP NOTE
Name: Ashley Comer    YOB: 1950    DATE OF SURGERY: 1/19/2024    PREOPERATIVE DIAGNOSIS: Left knee end-stage osteoarthritis    POSTOPERATIVE DIAGNOSIS: Left knee end-stage osteoarthritis    PROCEDURE PERFORMED: Left total knee replacement     SURGEON: Daniele Mcelroy M.D.    ASSISTANT: KAMILA MORRIS    A surgical assistant was integral in ensuring a successful outcome with this procedure.  The assistant was utilized to assist in positioning the patient, draping the patient, was used throughout the case to provide with retraction of tissues, suctioning of blood and body fluids for visualization, positioning of the extremity to allow for proper exposure so that I could perform the procedure.  Without the use of a surgical assistant during this procedure I feel that the outcome may have been compromised or would have been suboptimal or at risk for complications.    IMPLANTS: [employer] [facility]:     Implant Name Type Inv. Item Serial No.  Lot No. LRB No. Used Action   CMT BONE PALACOS R HI/VISC 1X40 - [id number] Implant CMT BONE PALACOS R HI/VISC 1X40  [facility] [id number] Left 2 Implanted   DEV CONTRL TISS STRATAFIX SYMM PDS PLUS VAHID CT-1 60CM - [id number] Implant DEV CONTRL TISS STRATAFIX SYMM PDS PLUS VAHID CT-1 60CM  [employer]  DIV [employer] [id number] Left 1 Implanted   DEV CONTRL TISS STRATAFIXSPIRALMNCRYL PLSPS2 REV3/0 45CM - [id number] Implant DEV CONTRL TISS STRATAFIXSPIRALMNCRYL PLSPS2 REV3/0 45CM  [employer]  DIV OF J AND J [id number] Left 1 Implanted   PATELLA RESRF GEN2 7.5X32MM - [id number] Implant PATELLA RESRF GEN2 7.5X32MM  [employer] [id number] Left 1 Implanted   BASE TIB/KN GEN2 NONPOR TI SZ3 LT - [id number] Implant BASE TIB/KN GEN2 NONPOR TI SZ3 LT  [employer] [id number] Left 1 Implanted   COMP FEM LEGION OXINIUM CR NRW SZ5 LT - [id number] Implant COMP FEM LEGION OXINIUM CR NRW SZ5 LT  [employer] [id number] Left 1 Implanted   INSRT ART/KN LEGION CR HF XLPE  SZ3TO4 9MM - [id number] Implant INSRT ART/KN LEGION CR HF XLPE SZ3TO4 9MM  [employer] [id number] Left 1 Implanted       Estimated Blood Loss: 200cc  Specimens : none  Complications: none    DESCRIPTION OF PROCEDURE: The patient was taken to the operating room and placed in the supine position. A sequential compression device was carefully placed on the non-operative leg. Preoperative antibiotics were administered. Surgical time out was performed. After adequate induction of anesthesia, the leg was prepped and draped in the usual sterile fashion, exsanguinated with an Esmarch bandage and the tourniquet inflated to 250 mmHg. A midline incision was performed followed by a medial parapatellar arthrotomy. The patella was subluxed laterally.  A portion of the fat pad, ACL, and anterior horns of the meniscus were excised.  A drill hole was then placed in the center of the femoral canal in line with the canal.  It was irrigated and suctioned.  The intramedullary gonsalo was then placed and a 5 degree distal valgus cut was performed after the block pinned in place appropriately.  Cut surface was then removed.  The sizing and rotation guide was then placed and seated appropriately.  It was sized and then the drill holes for the 4-in-1 cutting guide were placed in 3 degrees of external rotation based off of the posterior condyles.  The 4-in-1 cutting block was then placed and the femoral cuts were performed.  The excess bone was removed and the cut surfaces looked good.  At this point we placed the retractors around the proximal tibia and a slight release of the PCL fibers off of the posterior proximal tibia was performed.  We used the extramedullary tibial alignment guide and it was aligned appropriately and then the depth was set and the block pinned in place.  The tibial cut was then performed and the alignment guide was removed.  The tibial cut was removed and the cut surface looked good.  The posterior horns of the  menisci were then removed as well as the posterior osteophytes.  Flexion extension blocks were then used to check the balance of the knee. The tibial cut surface was then sized with the sizing templates and the tibial and femoral trial were then placed. The knee was placed in full extension and then the tibial tray rotation was then matched to the femoral rotation and marked.    Attention was then placed to the patella. The patella was noted to track centrally through range of motion. The patella was then sized with the trials. The thickness of the patella was then measured. The patella was resurfaced and the surrounding osteophytes were removed. The preoperative thickness was reproduced. The patella tracked centrally through range of motion.  We then checked the balance with the trial implants in place and there was excellent medial lateral and flexion-extension balance.    At this point all trial components were removed, the knee was copiously irrigated with pulsed lavage, and the knee was injected with anesthetic cocktail solution. The cut surfaces were then dried with clean lap sponges, and the components were cemented tibia, followed by femur, then patella. The knee was held in full extension and all excess cement was removed. The knee was held still until the cement had completely hardened. We then placed the trial polyethylene spacer which resulted in full extension and excellent flexion-extension balance. We placed the final polyethylene spacer.   The knee was then copiously irrigated. The tourniquet was then released. There was excellent hemostasis. We placed a one-eighth inch Hemovac drain. We closed the knee in multiple layers in standard fashion. Sterile dressing were applied. At the end of the case, the sponge and needle counts were reported as being correct. There were no known complications. The patient was then transported to the recovery room.      Daniele Mcelroy M.D.

## 2024-01-19 NOTE — PROGRESS NOTES
[facility] to follow for home PT per Dr Mcelroy.  Spoke with dtstephanie Peoples who will be staying with patient and verified all info.  All is correct.  Best number to call is patient's mobile, second is Dtr Richelle's mobile.  Had our services in 2022 and requests Jimmy Owens if possible.  Thank you.  D/Cing today from surgery.

## 2024-01-19 NOTE — DISCHARGE PLACEMENT REQUEST
"Harpreet Comer (73 y.o. Female)       Date of Birth   1950    Social Security Number       Address   [address] [place] [address]    Home Phone   [phone]    MRN   [id number]       Synagogue   Orthodoxy    Marital Status                               Admission Date   1/19/24    Admission Type   Elective    Admitting Provider   Daniele Mcelroy MD    Attending Provider   Daniele Mcelroy MD    Department, Room/Bed   [facility] OR, [facility] OR[facility] OR       Discharge Date       Discharge Disposition   Home-Health Care [facility]    Discharge Destination                                 Attending Provider: Daneile Mcelroy MD    Allergies: Penicillins    Isolation: None   Infection: None   Code Status: Prior    Ht: 157.5 cm (62.01\")   Wt: 71.6 kg (157 lb 13.6 oz)    Admission Cmt: None   Principal Problem: OA (osteoarthritis) of knee [M17.9]                   Active Insurance as of 1/19/2024       Primary Coverage       Payor Plan Insurance Group Employer/Plan Group    MEDICARE MEDICARE A & B        Payor Plan Address Payor Plan Phone Number Payor Plan Fax Number Effective Dates    [address] [phone]  9/1/2014 - None Entered    [facility] [id number]         Subscriber Name Subscriber Birth Date Member ID       HARPREET COMER 1950 [id number]               Secondary Coverage       Payor Plan Insurance Group Employer/Plan Group    AARP [employer] SUP AARP HEALTH CARE OPTIONS        Payor Plan Address Payor Plan Phone Number Payor Plan Fax Number Effective Dates    [facility] [phone]  12/1/2015 - None Entered    [address]       [facility] [address]         Subscriber Name Subscriber Birth Date Member ID       HARPREET COMER 1950 [id number]                     Emergency Contacts        (Rel.) Home Phone Work Phone Mobile Phone    Richelle Tong (Daughter) [phone] -- [phone]    JOLANTA GODWIN (Sister) [phone] -- [phone]    sj sanford " (Sister) -- -- [phone]

## 2024-01-19 NOTE — ANESTHESIA PREPROCEDURE EVALUATION
Anesthesia Evaluation     Patient summary reviewed and Nursing notes reviewed   no history of anesthetic complications:   NPO Solid Status: > 8 hours  NPO Liquid Status: > 2 hours           Airway   Mallampati: II  TM distance: >3 FB  Neck ROM: full  Dental      Pulmonary    (+) a smoker Former,  Cardiovascular     ECG reviewed    (+) hyperlipidemia    ROS comment: Echo reviewed    Neuro/Psych  GI/Hepatic/Renal/Endo    (+) GERD, thyroid problem     Musculoskeletal     Abdominal    Substance History      OB/GYN          Other   arthritis,                       Anesthesia Plan    ASA 2     general     intravenous induction     Anesthetic plan, risks, benefits, and alternatives have been provided, discussed and informed consent has been obtained with: patient.        CODE STATUS:

## 2024-01-19 NOTE — DISCHARGE INSTRUCTIONS
What to expect after a Nerve Block    Nerve blocks administered to block pain affect many types of nerves, including those nerves that control movement, pain, and normal sensation. Following a nerve block, you may notice some bruising at the site where the block was given. You may experience sensations such as: numbness of the affected area or limb, tingling, heaviness (that is the limb feels heavy to you), weakness or inability to move the affected arm or leg, or a feeling as if your arm or leg has “fallen asleep.”     A nerve block can last from 2 to 36 hours depending on the medications used.  Usually the weakness wears off first followed by the tingling and heaviness. As the block wears off, you may begin to notice pain; however, this sequence of events may occur in any order. Typically, you will be able to move your limb before you will feel it. Once a nerve block begins to wear off, the effects are usually completely gone within 60 minutes.  If you experience continued side effects that you believe are block related for longer than 48 hours, please call your healthcare provider. Please see block-specific instructions below.    Instructions for any Block involving the leg/foot:   If you have had a leg /foot block, you should not bear weight on the affected leg until the block has worn off. After the block has worn off, weight bearing should be as directed by your surgeon. You may be sent home with crutches. You are at high risk for falling because of the anesthetic effects on your leg. Please use caution when standing or trying to move or walk. Have someone assist you until your leg and foot function have returned to normal.     Protection of a “blocked” limb  After a nerve block, you cannot feel pain, pressure, or extremes of temperature in the affected limb. And because of this, your blocked limb is at more risk for injury. For example, it is possible to burn your limb on an extremely hot surface without  feeling it.     When resting, it is important to reposition your limb periodically to avoid prolonged pressure on it. This may require the use of pillows and padding.    While sleeping, you should avoid rolling onto the affected limb or putting too much pressure on it.     If you have a cast or tight dressing, check the color of your fingers or toes of the affected limb. Call your surgeon if they look discolored (that is, dusky, dark colored).    Use caution in cold weather. Cover your limb appropriately to protect it from the cold.    Pain Management:  Your surgeon will give you a prescription for pain medication. Begin taking this before the nerve block wears off. Bear in mind that sometimes the block can wear off in the middle of the night.

## 2024-01-19 NOTE — CASE MANAGEMENT/SOCIAL WORK
Continued Stay Note  [facility]     Patient Name: Ashley Comer  MRN: [id number]  Today's Date: 1/19/2024    Admit Date: 1/19/2024    Plan: Home with Judaism    Discharge Plan       Row Name 01/19/24 1153       Plan    Plan Home with Judaism     Patient/Family in Agreement with Plan yes    Provided Post Acute Provider List? Yes    Post Acute Provider List Home Health    Delivered To Patient    Method of Delivery Telephone                   Discharge Codes    No documentation.                 Expected Discharge Date and Time       Expected Discharge Date Expected Discharge Time    Jan 19, 2024               Shannon Epley, RN

## 2024-01-19 NOTE — ANESTHESIA POSTPROCEDURE EVALUATION
Patient: Ashley Comer    Procedure Summary       Date: 01/19/24 Room / Location: [facility] OR [address] [place] [address] [facility]    Anesthesia Start: 0757 Anesthesia Stop: 0947    Procedure: TOTAL KNEE ARTHROPLASTY (Left: Knee) Diagnosis:       Primary osteoarthritis of left knee      (Primary osteoarthritis of left knee [M17.12])    Surgeons: Daniele Mcelroy MD Provider: Zach Heart MD    Anesthesia Type: general ASA Status: 2            Anesthesia Type: general    Vitals  Vitals Value Taken Time   /75 01/19/24 1045   Temp 36.4 °C (97.5 °F) 01/19/24 1045   Pulse 68 01/19/24 1045   Resp 18 01/19/24 1045   SpO2 93 % 01/19/24 1045           Post Anesthesia Care and Evaluation    Patient location during evaluation: bedside  Patient participation: complete - patient participated  Level of consciousness: awake and alert  Pain management: adequate    Airway patency: patent  Anesthetic complications: No anesthetic complications  PONV Status: controlled  Cardiovascular status: blood pressure returned to baseline and acceptable  Respiratory status: acceptable  Hydration status: acceptable

## 2024-01-20 ENCOUNTER — HOME CARE VISIT (OUTPATIENT)
Dept: HOME HEALTH SERVICES | Facility: HOME HEALTHCARE | Age: 74
End: 2024-01-20
Payer: MEDICARE

## 2024-01-20 VITALS
OXYGEN SATURATION: 97 % | TEMPERATURE: 97.4 F | RESPIRATION RATE: 18 BRPM | DIASTOLIC BLOOD PRESSURE: 67 MMHG | HEART RATE: 55 BPM | SYSTOLIC BLOOD PRESSURE: 137 MMHG

## 2024-01-20 PROCEDURE — G0151 HHCP-SERV OF PT,EA 15 MIN: HCPCS

## 2024-01-20 NOTE — HOME HEALTH
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 Daughter currently staying with her and providing assist. Has FWW, walk in shower, BSC, cane    Skin Integrity/wound status: see wound care screen for details.    Code Status: Full    Medication issues/concerns: none identified    HB status: yes. See homebound screen for details.    Problems Identified: see Care Plan    Functional Status/Fall Risk/Safety: see PT evaluation/care plan    POC notification to  Dr. Carrington    on 1/20/24    via case communication.    Assessment: Skilled physical therapy is needed for 1w1, 3w2, 1w1 due to decreased ability to transfer and amb related to recent L TKR for evaluation, gait training ther ex, HEP, fall prevention and pain edema management.    Plan for next visit:gait training, pain/edema management, progress HEP"

## 2024-01-22 ENCOUNTER — HOME CARE VISIT (OUTPATIENT)
Dept: HOME HEALTH SERVICES | Facility: HOME HEALTHCARE | Age: 74
End: 2024-01-22
Payer: MEDICARE

## 2024-01-22 ENCOUNTER — TELEPHONE (OUTPATIENT)
Dept: ORTHOPEDIC SURGERY | Facility: HOSPITAL | Age: 74
End: 2024-01-22
Payer: MEDICARE

## 2024-01-22 VITALS
DIASTOLIC BLOOD PRESSURE: 72 MMHG | RESPIRATION RATE: 18 BRPM | HEART RATE: 80 BPM | SYSTOLIC BLOOD PRESSURE: 138 MMHG | OXYGEN SATURATION: 97 %

## 2024-01-22 PROCEDURE — G0151 HHCP-SERV OF PT,EA 15 MIN: HCPCS

## 2024-01-22 NOTE — TELEPHONE ENCOUNTER
Post op day 3  Discharge Instructions:  Ask patient about his or her discharge instructions  ?  Patient confirmed understanding   []  Further instruction needed   What, if any, recommendations, teaching, or interventions did you provide? Click or tap here to enter text.  Health status:  Pain controlled Yes   Pain is controlled with the pain medication.   Recommended interventions:  Yes  incision/dressing status   ?  Clean without redness, drainage, odor  []  Redness    []  Drainage - color Click or tap here to enter text.  []  Odor  CHRISTOPH - Green light blinking Yes  Difficulties urination No  Last BM 1/22/2024 (if no BM by day 3-recommend OTC suppository or fleets enema)  Was able to have a Bm this morning.   Medications:  ?Medications reviewed with patient/family/caregiver  Patient taking medications as prescribed?   Yes  If not taking medications as prescribed, note specific medicine(s) and reason for each:  Click or tap here to enter text.  Hospital Follow Up Plan:  Follow up Appointment with Orthopedic surgeon:  ?Has f/u appointment                []Scheduled f/u appointment  Home Care ordered at discharge?    Yes        Home Care started, or contact made?    Yes   If no, action taken:   DME obtained/used in home?         Yes   Using IS  Choose an item.   Other information: Ms. Comer said she is doing well. PT came out to see her Saturday and they are coming back again this afternoon. She is doing the bike and she loves it. She is walking, icing, elevating, and doing the exercises. Pain is doing well with the pain medication. Dressing looks good, the same as when she came home. She was having an issue with constipation, but that resolved at 2 this morning. Things are progressing well. Ms. Comer doesn't have any questions for me at this time. She has my contact information should she need anything.

## 2024-01-22 NOTE — HOME HEALTH
"Subjective: \"I'm doing pretty well so far. I know what to expect this time which has made it easier.\"    Falls reported: none    Medication changes: none    Plan for next visit: Continue gait training with rolling walker and begin progressing to cane, review supine/sitting TKR exercises and add standing exercises as tolerated, reassess ROM, and patient education as needed"

## 2024-01-24 ENCOUNTER — HOME CARE VISIT (OUTPATIENT)
Dept: HOME HEALTH SERVICES | Facility: HOME HEALTHCARE | Age: 74
End: 2024-01-24
Payer: MEDICARE

## 2024-01-24 VITALS
SYSTOLIC BLOOD PRESSURE: 132 MMHG | RESPIRATION RATE: 18 BRPM | OXYGEN SATURATION: 94 % | TEMPERATURE: 97.3 F | HEART RATE: 63 BPM | DIASTOLIC BLOOD PRESSURE: 76 MMHG

## 2024-01-24 PROCEDURE — G0151 HHCP-SERV OF PT,EA 15 MIN: HCPCS

## 2024-01-24 NOTE — HOME HEALTH
"Subjective: \"I'm doing better everyday. I'm almost scared that I have not had much pain with this surgery compared to my right one.\"    Falls reported: none    Medication changes: none    Plan for next visit: Continue gait pattern training with cane, assess/instruct stairs, review and further upgrades in standing for HEP, reassess ROM left knee and patient education as needed"

## 2024-01-26 ENCOUNTER — HOME CARE VISIT (OUTPATIENT)
Dept: HOME HEALTH SERVICES | Facility: HOME HEALTHCARE | Age: 74
End: 2024-01-26
Payer: MEDICARE

## 2024-01-26 VITALS
OXYGEN SATURATION: 98 % | DIASTOLIC BLOOD PRESSURE: 74 MMHG | RESPIRATION RATE: 18 BRPM | HEART RATE: 88 BPM | SYSTOLIC BLOOD PRESSURE: 132 MMHG

## 2024-01-26 PROCEDURE — G0151 HHCP-SERV OF PT,EA 15 MIN: HCPCS

## 2024-01-26 NOTE — HOME HEALTH
"Subjective: \"My leg was restless last night, so I didn't get much sleep.\"    Falls reported: none    Medication changes: none    Plan for next visit: Continue gait pattern training with cane and assess/instruct stairs, HEP review and add forward lunge stretch on steps as tolerated, and patient education as needed"

## 2024-01-29 ENCOUNTER — HOME CARE VISIT (OUTPATIENT)
Dept: HOME HEALTH SERVICES | Facility: HOME HEALTHCARE | Age: 74
End: 2024-01-29
Payer: MEDICARE

## 2024-01-29 VITALS
HEART RATE: 63 BPM | RESPIRATION RATE: 18 BRPM | DIASTOLIC BLOOD PRESSURE: 76 MMHG | OXYGEN SATURATION: 97 % | TEMPERATURE: 97.2 F | SYSTOLIC BLOOD PRESSURE: 136 MMHG

## 2024-01-29 PROCEDURE — G0151 HHCP-SERV OF PT,EA 15 MIN: HCPCS

## 2024-01-29 NOTE — HOME HEALTH
"Subjective: \"I'm better today.\"    Falls reported: none    Medication changes: none    Plan for next visit: Continue gait pattern training with cane/no device, review stair training, HEP reinstruction and add squats as tolerated, reassess ROM, and patient education as needed"

## 2024-01-30 ENCOUNTER — HOME CARE VISIT (OUTPATIENT)
Dept: HOME HEALTH SERVICES | Facility: HOME HEALTHCARE | Age: 74
End: 2024-01-30
Payer: MEDICARE

## 2024-01-30 VITALS
OXYGEN SATURATION: 97 % | DIASTOLIC BLOOD PRESSURE: 80 MMHG | TEMPERATURE: 96.8 F | SYSTOLIC BLOOD PRESSURE: 140 MMHG | HEART RATE: 69 BPM | RESPIRATION RATE: 18 BRPM

## 2024-01-30 PROCEDURE — G0151 HHCP-SERV OF PT,EA 15 MIN: HCPCS

## 2024-01-30 NOTE — HOME HEALTH
"Subjective: \"My pain levels vary but it's been very tolerable past few days.\"    Falls reported: none    Medication changes: none    Plan for next visit: discharge assessment and instructions, gait pattern training with/without cane, stair training, final HEP instruction, reassess ROM left knee, and patient education as needed"

## 2024-01-31 DIAGNOSIS — K21.9 GASTROESOPHAGEAL REFLUX DISEASE WITHOUT ESOPHAGITIS: ICD-10-CM

## 2024-01-31 RX ORDER — PANTOPRAZOLE SODIUM 40 MG/1
40 TABLET, DELAYED RELEASE ORAL DAILY
Qty: 90 TABLET | Refills: 1 | OUTPATIENT
Start: 2024-01-31

## 2024-02-01 ENCOUNTER — HOME CARE VISIT (OUTPATIENT)
Dept: HOME HEALTH SERVICES | Facility: HOME HEALTHCARE | Age: 74
End: 2024-02-01
Payer: MEDICARE

## 2024-02-01 VITALS
TEMPERATURE: 97 F | OXYGEN SATURATION: 97 % | DIASTOLIC BLOOD PRESSURE: 80 MMHG | RESPIRATION RATE: 18 BRPM | HEART RATE: 78 BPM | SYSTOLIC BLOOD PRESSURE: 136 MMHG

## 2024-02-01 PROCEDURE — G0151 HHCP-SERV OF PT,EA 15 MIN: HCPCS

## 2024-02-01 NOTE — HOME HEALTH
"Subjective: \"I had a weird twinge doing the lunge stretch yesterday so I stopped.\"    Falls reported: none    Medication changes: none"

## 2024-02-01 NOTE — CASE COMMUNICATION
This is to notify you of home PT/agency discharge effective [id number] with patient to start outpatient PT at [facility] [id number] and has follow-up in your office on [id number]. Goals met and no issues at this time.

## 2024-02-06 ENCOUNTER — OFFICE VISIT (OUTPATIENT)
Dept: ORTHOPEDIC SURGERY | Facility: CLINIC | Age: 74
End: 2024-02-06
Payer: MEDICARE

## 2024-02-06 VITALS — HEIGHT: 62 IN | BODY MASS INDEX: 28.97 KG/M2 | TEMPERATURE: 97.1 F | WEIGHT: 157.4 LBS

## 2024-02-06 DIAGNOSIS — Z96.652 STATUS POST LEFT KNEE REPLACEMENT: Primary | ICD-10-CM

## 2024-02-06 PROCEDURE — 1159F MED LIST DOCD IN RCRD: CPT | Performed by: ORTHOPAEDIC SURGERY

## 2024-02-06 PROCEDURE — 1160F RVW MEDS BY RX/DR IN RCRD: CPT | Performed by: ORTHOPAEDIC SURGERY

## 2024-02-06 PROCEDURE — 99024 POSTOP FOLLOW-UP VISIT: CPT | Performed by: ORTHOPAEDIC SURGERY

## 2024-02-06 NOTE — PROGRESS NOTES
Ashley Comer : 1950 MRN: [id number] DATE: 2024    DIAGNOSIS: 2 week follow up left total knee      SUBJECTIVE:Patient returns today for 2 week follow up of left total knee replacement. Patient reports doing well with no unusual complaints. Appears to be progressing appropriately.    OBJECTIVE:   Exam:. The incision is healing appropriately. No sign of infection. Range of motion is progressing as expected. The calf is soft and nontender with a negative Homans sign.    ASSESSMENT: 2 week status post left knee replacement.    PLAN: 1) Staples removed and steri strips applied   2) Order given for PT   3) Discontinue ANDREW hose   4) Continue ice PRN   5) aspirin 81 mg orally every day for 1 month   6) Follow up in 6 weeks with repeat Xrays of left knee (3views)    Daniele Mcelroy MD  2024

## 2024-02-13 DIAGNOSIS — M25.472 LEFT ANKLE SWELLING: ICD-10-CM

## 2024-02-15 RX ORDER — HYDROCHLOROTHIAZIDE 12.5 MG/1
12.5 TABLET ORAL DAILY
Qty: 90 TABLET | Refills: 0 | Status: SHIPPED | OUTPATIENT
Start: 2024-02-15

## 2024-02-16 ENCOUNTER — TELEPHONE (OUTPATIENT)
Dept: ORTHOPEDIC SURGERY | Facility: HOSPITAL | Age: 74
End: 2024-02-16
Payer: MEDICARE

## 2024-03-19 ENCOUNTER — OFFICE VISIT (OUTPATIENT)
Dept: ORTHOPEDIC SURGERY | Facility: CLINIC | Age: 74
End: 2024-03-19
Payer: MEDICARE

## 2024-03-19 VITALS — BODY MASS INDEX: 28.3 KG/M2 | WEIGHT: 153.8 LBS | TEMPERATURE: 98.6 F | HEIGHT: 62 IN

## 2024-03-19 DIAGNOSIS — Z96.652 STATUS POST LEFT KNEE REPLACEMENT: ICD-10-CM

## 2024-03-19 DIAGNOSIS — R52 PAIN: Primary | ICD-10-CM

## 2024-03-19 PROCEDURE — 99024 POSTOP FOLLOW-UP VISIT: CPT | Performed by: NURSE PRACTITIONER

## 2024-03-19 PROCEDURE — 73562 X-RAY EXAM OF KNEE 3: CPT | Performed by: NURSE PRACTITIONER

## 2024-03-19 NOTE — PROGRESS NOTES
Ashley Comer : 1950 MRN: [id number] DATE: 3/19/2024    DIAGNOSIS: 8 week follow up left total knee      SUBJECTIVE:Patient returns today for 8 week follow up of left total knee replacement. Patient reports doing well with no unusual complaints. Appears to be progressing appropriately. Patient reports that her pain level is minimal. Reports she is still going to outpatient PT at [facility] and making good process. Denies any instability or buckling issues. Denies any s/s of infection and she is w/o any other significant complaints.     OBJECTIVE:   Exam:. The incision is well healed. No sign of infection. Range of motion is measured at 3 to 120. The calf is soft and nontender with a negative Homans sign. Strength is progressing and the patient is ambulating appropriately.    DIAGNOSTIC STUDIES  Xrays: 3 views of the left knee (AP, lateral, and sunrise) were ordered and reviewed for evaluation of recent knee replacement. They demonstrate a well positioned, well aligned knee replacement without complicating factors noted. In comparison with previous films there has been no change.    ASSESSMENT: 8 week status post left knee replacement.    PLAN: 1) Continue with PT exercises as prescribed   2) Follow up in 10 months for annual visit    CARMELA Mahajan  3/19/2024

## 2024-04-02 ENCOUNTER — TELEPHONE (OUTPATIENT)
Dept: FAMILY MEDICINE CLINIC | Facility: CLINIC | Age: 74
End: 2024-04-02
Payer: MEDICARE

## 2024-04-02 NOTE — TELEPHONE ENCOUNTER
Hub staff attempted to follow warm transfer process and was unsuccessful     Caller: Ashley Comer    Relationship to patient: Self    Best call back number: [phone]     Patient is needing: PATIENT STATES THAT SHE HAD A MISSED CALL FROM THE OFFICE, BUT HAD NOT RECEIVED A VOICE MAIL.

## 2024-04-03 DIAGNOSIS — E78.00 PURE HYPERCHOLESTEROLEMIA: ICD-10-CM

## 2024-04-03 RX ORDER — EZETIMIBE 10 MG/1
10 TABLET ORAL DAILY
Qty: 30 TABLET | Refills: 0 | Status: SHIPPED | OUTPATIENT
Start: 2024-04-03

## 2024-04-10 ENCOUNTER — OFFICE VISIT (OUTPATIENT)
Dept: FAMILY MEDICINE CLINIC | Facility: CLINIC | Age: 74
End: 2024-04-10
Payer: MEDICARE

## 2024-04-10 VITALS
WEIGHT: 156 LBS | HEART RATE: 65 BPM | OXYGEN SATURATION: 98 % | BODY MASS INDEX: 28.71 KG/M2 | SYSTOLIC BLOOD PRESSURE: 120 MMHG | DIASTOLIC BLOOD PRESSURE: 74 MMHG | HEIGHT: 62 IN

## 2024-04-10 DIAGNOSIS — Z87.891 FORMER SMOKER: Primary | ICD-10-CM

## 2024-04-10 DIAGNOSIS — E78.00 PURE HYPERCHOLESTEROLEMIA: ICD-10-CM

## 2024-04-10 RX ORDER — EZETIMIBE 10 MG/1
10 TABLET ORAL DAILY
Qty: 90 TABLET | Refills: 1 | Status: SHIPPED | OUTPATIENT
Start: 2024-04-10

## 2024-04-10 NOTE — PROGRESS NOTES
"Chief Complaint  Follow-up (6 month) and Hyperlipidemia    Subjective          History of Present Illness    Ashley Comer 73 y.o. female presents for medical management. Since the last visit, she has overall felt well.  She has  Hyperlipidemia with elevated LDL and will restart Zetia. She stopped taking the medication 6 weeks ago due to muscle aches. The muscle aches did not completely resolve after stopping the medication. She admits she does not drink enough water . She will increase daily water intake and restart Zetia. Will recheck lipid panel in 3 months. She has been compliant with current medications, and I have reviewed them.  Will refill medications.     She is a former smoker.  She quit in 1992.  She smoked 1 PPD for 24 years.  She is interested in lung cancer screening and CT cardiac calcium score screening.  She is asymptomatic.            Objective   Vital Signs:   /74   Pulse 65   Ht 157.5 cm (62\")   Wt 70.8 kg (156 lb)   SpO2 98%   BMI 28.53 kg/m²      BMI is >= 25 and <30. (Overweight) The following options were offered after discussion;: exercise counseling/recommendations and nutrition counseling/recommendations       Physical Exam  Vitals and nursing note reviewed.   Constitutional:       Appearance: She is well-developed and overweight. She is not toxic-appearing.   HENT:      Head: Normocephalic.   Eyes:      General: No scleral icterus.     Pupils: Pupils are equal, round, and reactive to light.   Neck:      Thyroid: No thyromegaly.      Vascular: No carotid bruit.   Cardiovascular:      Rate and Rhythm: Normal rate and regular rhythm.      Heart sounds: Normal heart sounds.   Pulmonary:      Effort: Pulmonary effort is normal. No respiratory distress.      Breath sounds: Normal breath sounds. No stridor.   Musculoskeletal:         General: No deformity.   Skin:     General: Skin is warm.      Coloration: Skin is not jaundiced.   Neurological:      General: No focal deficit " present.      Mental Status: She is alert and oriented to person, place, and time.   Psychiatric:         Behavior: Behavior normal.         Thought Content: Thought content normal.         Judgment: Judgment normal.          The following data was reviewed by: CARMELA Wray on 04/10/2024:  Lipid Panel With LDL / HDL Ratio (06/08/2023 09:36)                Assessment and Plan      Assessment & Plan  Pure hypercholesterolemia    Elevated LDL  Restart Zetia  Low cholesterol diet, exercise, weight loss  Follow up in 3 months for lipid panel  Former smoker  Quit in 1992  24-pack year history  Low dose CT and cardiac calcium CT ordered    Orders Placed This Encounter   Procedures    CT Chest Low Dose Wo    CT Cardiac Calcium Score Without Dye     New Medications Ordered This Visit   Medications    ezetimibe (ZETIA) 10 MG tablet     Sig: Take 1 tablet by mouth Daily. Indications: High Amount of Fats in the Blood     Dispense:  90 tablet     Refill:  1              Follow Up     Return in about 3 months (around 7/10/2024) for Next scheduled follow up.    Patient was given instructions and counseling regarding her condition or for health maintenance advice. Please see specific information pulled into the AVS if appropriate.

## 2024-05-05 ENCOUNTER — HOSPITAL ENCOUNTER (OUTPATIENT)
Facility: HOSPITAL | Age: 74
Discharge: HOME | End: 2024-05-05
Payer: MEDICARE

## 2024-05-05 PROCEDURE — 75571 CT HRT W/O DYE W/CA TEST: CPT

## 2024-05-05 PROCEDURE — 71271 CT THORAX LUNG CANCER SCR C-: CPT

## 2024-05-12 DIAGNOSIS — E04.2 MULTIPLE THYROID NODULES: Primary | ICD-10-CM

## 2024-05-12 DIAGNOSIS — N20.0 RENAL STONES: Primary | ICD-10-CM

## 2024-05-15 DIAGNOSIS — M25.472 LEFT ANKLE SWELLING: ICD-10-CM

## 2024-05-15 RX ORDER — HYDROCHLOROTHIAZIDE 12.5 MG/1
12.5 TABLET ORAL DAILY
Qty: 30 TABLET | Refills: 0 | Status: SHIPPED | OUTPATIENT
Start: 2024-05-15

## 2024-05-22 ENCOUNTER — HOSPITAL ENCOUNTER (OUTPATIENT)
Facility: HOSPITAL | Age: 74
Discharge: HOME | End: 2024-05-22
Payer: MEDICARE

## 2024-05-22 PROCEDURE — 76536 US EXAM OF HEAD AND NECK: CPT

## 2024-06-11 ENCOUNTER — OFFICE VISIT (OUTPATIENT)
Dept: ORTHOPEDIC SURGERY | Facility: CLINIC | Age: 74
End: 2024-06-11
Payer: MEDICARE

## 2024-06-11 VITALS — WEIGHT: 155.2 LBS | TEMPERATURE: 97.8 F | BODY MASS INDEX: 28.56 KG/M2 | HEIGHT: 62 IN

## 2024-06-11 DIAGNOSIS — Z96.652 STATUS POST LEFT KNEE REPLACEMENT: ICD-10-CM

## 2024-06-11 DIAGNOSIS — R52 PAIN: Primary | ICD-10-CM

## 2024-06-11 PROCEDURE — 73562 X-RAY EXAM OF KNEE 3: CPT | Performed by: NURSE PRACTITIONER

## 2024-06-11 PROCEDURE — 99213 OFFICE O/P EST LOW 20 MIN: CPT | Performed by: NURSE PRACTITIONER

## 2024-06-11 NOTE — PROGRESS NOTES
"Ashley Comer : 1950 MRN: [id number] DATE: 2024    Chief Complaint:  Follow up left total knee      SUBJECTIVE:Patient returns today for a follow up of left total knee replacement. Patient had surgery in January.  Reports approximately 6 weeks ago she started having this popping sensation going from a seated to standing position that was not previously there.  She states that she has some initial discomfort when this occurs but improves after taking a few steps.  Denies any recent fall injury or trauma to the knee.  Denies any instability or buckling issues.  Patient reports she also has some discomfort to the lower quad area as well as the lateral side of the knee.  Describes the pain as a ache.  Also reports that she has had some recent burning sensation around her incision area.  She denies any signs or symptoms of infection, she is without any other significant complaints today.    OBJECTIVE:    Temp 97.8 °F (36.6 °C) (Temporal)   Ht 157.5 cm (62\")   Wt 70.4 kg (155 lb 3.2 oz)   LMP  (LMP Unknown)   BMI 28.39 kg/m²   Family History   Problem Relation Age of Onset    Dementia Mother     Hypertension Mother     Tuberculosis Mother     Pneumonia Mother     Broken bones Mother         Wrist, hip    Osteoporosis Mother     Prostate cancer Father     Tuberculosis Father     Cancer Father         prostate w/bone mets    Ulcerative colitis Sister     Cancer Sister         Throat    Breast cancer Sister     Cancer Sister         Malig Teratoma    Cancer Sister         Breast    Cancer Sister         Breast    Cancer Brother         Prostate    Breast cancer Maternal Aunt     Cancer Maternal Aunt         Breast    Lung cancer Paternal Aunt     Brain cancer Paternal Aunt     Breast cancer Paternal Aunt     Cancer Paternal Aunt         Breast    Cancer Paternal Aunt         Lung    Cancer Paternal Aunt         Lung    Colon polyps Maternal Grandmother     Malig Hyperthermia Neg Hx     Colon cancer Neg Hx  "    Crohn's disease Neg Hx     Irritable bowel syndrome Neg Hx      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    Cataract     removed 2014    Colon polyp 02/22/2016    As report to me by Dr Rodriguez    Degenerative disc disease, cervical     Disease of thyroid gland     THYROID NODULES    Edema     Esophageal reflux     GERD (gastroesophageal reflux disease)     Headache     History of GI bleed     History of transfusion 02/2016    6 UNITS    Hyperlipidemia     Knee swelling 2006    Left knee pain     Migraine     Neck pain     Osteopenia     Osteoporosis     Other chest pain     RESOLVED    Scoliosis     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    Urinary tract infection 1970     Past Surgical History:   Procedure Laterality Date    APPENDECTOMY  02/23/2016    CATARACT EXTRACTION Bilateral 2014    COLONOSCOPY N/A 07/28/2017    Procedure: COLONOSCOPY INTO CECUM WITH POLYPECTOMY;  Surgeon: Mini Meraz MD;  Location: [facility] ENDOSCOPY;  Service:     COLONOSCOPY N/A 07/30/2021    Procedure: COLONOSCOPY;  Surgeon: Mini Meraz MD;  Location: [facility] MAIN OR;  Service: Gastroenterology;  Laterality: N/A;  normal    ENDOSCOPY N/A 07/28/2017    Procedure: ESOPHAGOGASTRODUODENOSCOPY WITH BIOPSIES;  Surgeon: Mini Meraz MD;  Location: [facility] ENDOSCOPY;  Service:     ENDOSCOPY N/A 07/30/2021    Procedure: ESOPHAGOGASTRODUODENOSCOPY;  Surgeon: Mini Meraz MD;  Location: [facility] MAIN OR;  Service: Gastroenterology;  Laterality: N/A;  gastritis, esophagitis    ENDOSCOPY N/A 6/19/2023    Procedure: ESOPHAGOGASTRODUODENOSCOPY WITH BIOPSY;  Surgeon: Mookie Hawkins MD;  Location: [facility] MAIN OR;  Service: Gastroenterology;  Laterality: N/A;  gastric polyps    FOOT SURGERY Right 1954    DEBRIDMENT     JOINT REPLACEMENT  2022    LAPAROSCOPIC APPENDECTOMY      SMALL INTESTINE SURGERY  2016    for GIST     THYROID SURGERY Left     thyroid lobectomy     TOTAL KNEE ARTHROPLASTY Right 2022    Procedure: TOTAL KNEE ARTHROPLASTY;  Surgeon: Daniele Mcelroy MD;  Location: [facility] OR [facility];  Service: Orthopedics;  Laterality: Right;    TOTAL KNEE ARTHROPLASTY Left 2024    Procedure: TOTAL KNEE ARTHROPLASTY;  Surgeon: Daniele Mcelroy MD;  Location: [facility] OR [facility];  Service: Orthopedics;  Laterality: Left;    VITRECTOMY Right 2015     Social History     Socioeconomic History    Marital status:      Spouse name: Jimmy    Number of children: 2    Years of education: COLLEGE   Tobacco Use    Smoking status: Former     Current packs/day: 0.00     Average packs/day: 1 pack/day for 25.0 years (25.0 ttl pk-yrs)     Types: Cigarettes     Start date: 1968     Quit date: 1992     Years since quittin.4    Smokeless tobacco: Never   Vaping Use    Vaping status: Never Used   Substance and Sexual Activity    Alcohol use: Yes     Comment: occas    Drug use: No    Sexual activity: Not Currently     Partners: Male       Review of Systems: 14 point review of systems performed pertinent positives and negatives discussed above, all other systems are negative    Exam:. The incision is well healed. Range of motion is measured at 0 to 120.  Mildly tender over IT band insertion site at Tangela's tubercle.  The calf is soft and nontender with a negative Homans sign. Alignment is neutral. Good quad strength. There is no evidence of varus/valgus or flexion instability. No effusion. Intact to light touch with palpable distal pulses.     DIAGNOSTIC STUDIES  Xrays: 3 views(AP bilateral knees, lateral left, and sunrise bilateral knees) were ordered and reviewed for evaluation of left knee replacement. They demonstrate a well positioned, well aligned knee replacement without complicating factors noted. In comparison  with previous films there has been no change.    ASSESSMENT:    Follow up left knee replacement /IT band insertion tendinitis       PLAN:      Treatment option as well as imaging results were discussed with the patient.  Patient's knee mechanically functions well and reports no instability issues.  There is no sign of an abnormality noted on her x-rays.  Patient was mildly tender to palpation over her IT band insertion site.  I reinforced the RICE method to help with inflammation and swelling.  We will continue to closely monitor this situation.  If her symptoms progressively worsen or do not improve in the next 6 to 8 weeks we could do further imaging of an MRI with metal suppression protocol.    Xavi Delgado, CARMELA  6/11/2024

## 2024-06-14 DIAGNOSIS — M25.472 LEFT ANKLE SWELLING: ICD-10-CM

## 2024-06-14 NOTE — TELEPHONE ENCOUNTER
Rx Refill Note  Requested Prescriptions     Pending Prescriptions Disp Refills    hydroCHLOROthiazide 12.5 MG tablet [Pharmacy Med Name: HYDROCHLOROTHIAZIDE 12.5 MG TB] 690 tablet 0     Sig: TAKE 1 TABLET BY MOUTH DAILY FOR HIGH BLOOD PRESSURE      Last office visit with prescribing clinician: 4/10/2024   Last telemedicine visit with prescribing clinician: Visit date not found   Next office visit with prescribing clinician: 7/11/2024                         Would you like a call back once the refill request has been completed: [] Yes [] No    If the office needs to give you a call back, can they leave a voicemail: [] Yes [] No    Shira Ndiaye  06/14/24, 10:58 EDT

## 2024-06-16 RX ORDER — HYDROCHLOROTHIAZIDE 12.5 MG/1
12.5 TABLET ORAL DAILY
Qty: 30 TABLET | Refills: 0 | Status: SHIPPED | OUTPATIENT
Start: 2024-06-16

## 2024-06-24 ENCOUNTER — OFFICE VISIT (OUTPATIENT)
Dept: GASTROENTEROLOGY | Facility: CLINIC | Age: 74
End: 2024-06-24
Payer: MEDICARE

## 2024-06-24 VITALS
BODY MASS INDEX: 28.49 KG/M2 | HEIGHT: 62 IN | WEIGHT: 154.8 LBS | OXYGEN SATURATION: 98 % | HEART RATE: 68 BPM | TEMPERATURE: 96.9 F | SYSTOLIC BLOOD PRESSURE: 120 MMHG | DIASTOLIC BLOOD PRESSURE: 70 MMHG

## 2024-06-24 DIAGNOSIS — K22.4 ESOPHAGEAL SPASM: ICD-10-CM

## 2024-06-24 DIAGNOSIS — K21.9 GASTROESOPHAGEAL REFLUX DISEASE WITHOUT ESOPHAGITIS: ICD-10-CM

## 2024-06-24 DIAGNOSIS — R07.89 ATYPICAL CHEST PAIN: ICD-10-CM

## 2024-06-24 PROCEDURE — 1160F RVW MEDS BY RX/DR IN RCRD: CPT | Performed by: NURSE PRACTITIONER

## 2024-06-24 PROCEDURE — 1159F MED LIST DOCD IN RCRD: CPT | Performed by: NURSE PRACTITIONER

## 2024-06-24 PROCEDURE — 99213 OFFICE O/P EST LOW 20 MIN: CPT | Performed by: NURSE PRACTITIONER

## 2024-06-24 RX ORDER — PEDI MULTIVIT NO.25/FOLIC ACID 300 MCG
TABLET,CHEWABLE ORAL
COMMUNITY

## 2024-06-24 RX ORDER — PANTOPRAZOLE SODIUM 20 MG/1
20 TABLET, DELAYED RELEASE ORAL DAILY
Qty: 90 TABLET | Refills: 3 | Status: SHIPPED | OUTPATIENT
Start: 2024-06-24

## 2024-06-24 NOTE — PATIENT INSTRUCTIONS
Colonoscopy due 5 year interval 7/2026      Continue calcium citrate while on acid Rx    Prescription for pantoprazole 20 mg sent to pharmacy, our goal is lowest dose possible to control symptoms    If esophageal spasms become more frequent or return, could consider second dose of pantoprazole 20 mg a couple of days per week or higher pantoprazole based on symptoms etc for a short while    Otherwise follow up visit yearly, sooner if symptoms change or condition changes

## 2024-06-24 NOTE — PROGRESS NOTES
"Chief Complaint   Patient presents with    Follow-up     Acid reflux           History of Present Illness    73-year-old female presents today for follow-up.  Last EGD June 19, 2023.  Last office visit July 6, 2023.  Initial consult May 22, 2023 for acid reflux with increased belching, early satiety and chest pressure.  She has a history of GIST status post laparoscopic appendectomy and partial small bowel resection with reanastomosis February 22, 2016 with Dr. Meraz. 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  Her prognosis was good with surgery alone and adjunctive Gleevec therapy was not recommended.   She has completed 5 years of surveillance with oncology, follow-up CT scan January 2021 for resected GIST and has been released to follow-up with our office as needed.       Prior to EGD she was on omeprazole, she switched to pantoprazole prior to initial consult with our office and continues on pantoprazole.  At our last office visit, we discussed decreasing pantoprazole from 40 mg to 20 mg daily if blood work showed improvement in iron and ferritin levels.    She has been on pantoprazole 20 mg daily since December 2023, this works well to control symptoms.  She denies epigastric pain.  She has had 2 episodes of atypical chest pain, esophageal spasms earlier this month but otherwise has not had any in the past year.    She has discontinued nonsteroidal anti-inflammatories, status post knee replacement January 2024.     She has noticed improvement in symptoms since starting pantoprazole.     Result Review :       CBC & Differential (11/06/2023 09:00)  Ferritin (11/06/2023 09:00)  Iron Profile (11/06/2023 09:00)  CBC (No Diff) (01/03/2024 12:21)    Vital Signs:   /70   Pulse 68   Temp 96.9 °F (36.1 °C)   Ht 157.5 cm (62.01\")   Wt 70.2 kg (154 lb 12.8 oz)   SpO2 98%   BMI 28.31 kg/m²     Body mass index is 28.31 kg/m².     Physical Exam  Vitals reviewed. "   Constitutional:       General: She is not in acute distress.     Appearance: Normal appearance. She is not ill-appearing or toxic-appearing.   Eyes:      General: No scleral icterus.  Pulmonary:      Effort: Pulmonary effort is normal. No respiratory distress.   Skin:     Coloration: Skin is not jaundiced.   Neurological:      Mental Status: She is alert and oriented to person, place, and time.   Psychiatric:         Mood and Affect: Mood normal.         Behavior: Behavior normal.         Thought Content: Thought content normal.         Judgment: Judgment normal.           Assessment and Plan    Diagnoses and all orders for this visit:    1. Gastroesophageal reflux disease without esophagitis  -     pantoprazole (Protonix) 20 MG EC tablet; Take 1 tablet by mouth Daily. Indications: Heartburn  Dispense: 90 tablet; Refill: 3    2. Esophageal spasm  -     pantoprazole (Protonix) 20 MG EC tablet; Take 1 tablet by mouth Daily. Indications: Heartburn  Dispense: 90 tablet; Refill: 3    3. Atypical chest pain  -     pantoprazole (Protonix) 20 MG EC tablet; Take 1 tablet by mouth Daily. Indications: Heartburn  Dispense: 90 tablet; Refill: 3       73-year-old female presents today for follow-up.  She is doing well from a GI standpoint, acid reflux, esophageal spasms and atypical chest pain are controlled with lower dose pantoprazole 20 mg daily.  Refill sent electronically to pharmacy.  Our goal is lowest dose of acid medication possible to control symptoms.    If she has a return or worsening esophageal spasms, encouraged patient to contact the office and we will make additional recommendations, to consider a second dose of pantoprazole 20 mg daily several times a week versus increasing pantoprazole to 40 mg daily for 8 weeks and then decreasing back to lowest dose possible to control symptoms based on clinical course, as discussed at today's visit.    Colon cancer screening up-to-date, due for surveillance colonoscopy  July 2026, this has been placed in our electronic reminder system.  Based on clinical course, to consider EGD at the time of colonoscopy however she does not have a diagnosis that needs routine EGDs or monitoring and no plans for repeat EGD at this time.    History of GIST tumor, removed, 5-year surveillance complete 2021, patient has been released from oncology and is to follow-up on an as-needed basis only.    Follow-up with our office yearly, sooner if symptoms change or condition warrants.          Patient Instructions   Colonoscopy due 5 year interval 7/2026      Continue calcium citrate while on acid Rx    Prescription for pantoprazole 20 mg sent to pharmacy, our goal is lowest dose possible to control symptoms    If esophageal spasms become more frequent or return, could consider second dose of pantoprazole 20 mg a couple of days per week or higher pantoprazole based on symptoms etc for a short while    Otherwise follow up visit yearly, sooner if symptoms change or condition changes            EMR Dragon/Transcription Disclaimer:  This document has been Dictated utilizing Dragon dictation.

## 2024-07-23 DIAGNOSIS — M25.472 LEFT ANKLE SWELLING: ICD-10-CM

## 2024-07-23 RX ORDER — HYDROCHLOROTHIAZIDE 12.5 MG/1
12.5 TABLET ORAL DAILY
Qty: 90 TABLET | Refills: 1 | Status: SHIPPED | OUTPATIENT
Start: 2024-07-23

## 2024-08-07 ENCOUNTER — OFFICE VISIT (OUTPATIENT)
Dept: FAMILY MEDICINE CLINIC | Facility: CLINIC | Age: 74
End: 2024-08-07
Payer: MEDICARE

## 2024-08-07 VITALS
DIASTOLIC BLOOD PRESSURE: 76 MMHG | WEIGHT: 156.8 LBS | OXYGEN SATURATION: 98 % | TEMPERATURE: 98.3 F | HEART RATE: 70 BPM | BODY MASS INDEX: 28.85 KG/M2 | SYSTOLIC BLOOD PRESSURE: 120 MMHG | HEIGHT: 62 IN

## 2024-08-07 DIAGNOSIS — E78.00 PURE HYPERCHOLESTEROLEMIA: ICD-10-CM

## 2024-08-07 DIAGNOSIS — Z00.00 MEDICARE ANNUAL WELLNESS VISIT, SUBSEQUENT: Primary | ICD-10-CM

## 2024-08-07 NOTE — PROGRESS NOTES
Subjective   The ABCs of the Annual Wellness Visit  Medicare Wellness Visit      Ashley Comer is a 73 y.o. patient who presents for a Medicare Wellness Visit.    The following portions of the patient's history were reviewed and   updated as appropriate: allergies, current medications, past family history, past medical history, past social history, past surgical history, and problem list.    Compared to one year ago, the patient's physical   health is better.  Compared to one year ago, the patient's mental   health is the same.    Recent Hospitalizations:  She was not admitted to the hospital during the last year.     Current Medical Providers:  Patient Care Team:  Manjula Champagne APRN as PCP - General (Family Medicine)  Vargas Mejias MD as Consulting Physician (Hematology and Oncology)  Mini Meraz MD as Referring Physician (General Surgery)  Sacha Porter MD as Consulting Physician (Obstetrics and Gynecology)  Marylou Johnson APRN as Nurse Practitioner (Gastroenterology)  Henry Taylor MD as Cardiologist (Cardiology)    Outpatient Medications Prior to Visit   Medication Sig Dispense Refill    acetaminophen (TYLENOL) 500 MG tablet Take 2 tablets by mouth Every 6 (Six) Hours As Needed for Mild Pain. Indications: Pain      cetirizine (zyrTEC) 10 MG tablet Take 1 tablet by mouth Daily As Needed. Indications: Hayfever      cholecalciferol (VITAMIN D3) 25 MCG (1000 UT) tablet       ezetimibe (ZETIA) 10 MG tablet Take 1 tablet by mouth Daily. Indications: High Amount of Fats in the Blood 90 tablet 1    fluticasone (FLONASE) 50 MCG/ACT nasal spray 2 sprays into the nostril(s) as directed by provider Daily As Needed for Allergies. Indications: Allergic Rhinitis      hydroCHLOROthiazide 12.5 MG tablet Take 1 tablet by mouth Daily. for high blood pressure 90 tablet 1    ibandronate (BONIVA) 150 MG tablet Take 1 tablet by mouth Every 30 (Thirty) Days. DUE ON SEPT 24/2022  Indications: Postmenopausal  "Osteoporosis      pantoprazole (Protonix) 20 MG EC tablet Take 1 tablet by mouth Daily. Indications: Heartburn 90 tablet 3    Pediatric Multivitamins-Iron (Flintstones Complete) 18 MG chewable tablet chewable tablet       VITAMIN B COMPLEX-C PO Take 1 tablet by mouth Daily. HOLD PER MD WINKLER      Calcium Citrate 250 MG tablet        No facility-administered medications prior to visit.     No opioid medication identified on active medication list. I have reviewed chart for other potential  high risk medication/s and harmful drug interactions in the elderly.      Aspirin is not on active medication list.  Aspirin use is not indicated based on review of current medical condition/s. Risk of harm outweighs potential benefits.  .    Patient Active Problem List   Diagnosis    Iron deficiency anemia    GIST (gastrointestinal stromal tumor) of small bowel, malignant    Hoarseness of voice    Mixed hyperlipidemia    Rectal polyp    Neck pain    Multiple thyroid nodules    Spinal stenosis in cervical region    Cervical radiculopathy due to intervertebral disc disorder    GERD (gastroesophageal reflux disease)    Occult blood in stools    OA (osteoarthritis) of knee    Atypical chest pain    Esophageal spasm    Palpitations    Systolic murmur     Advance Care Planning Advance Directive is not on file.  ACP discussion was declined by the patient. Patient has an advance directive (not in EMR), copy requested.            Objective   Vitals:    08/07/24 0904   BP: 120/76   BP Location: Left arm   Patient Position: Sitting   Cuff Size: Adult   Pulse: 70   Temp: 98.3 °F (36.8 °C)   TempSrc: Oral   SpO2: 98%   Weight: 71.1 kg (156 lb 12.8 oz)   Height: 157.5 cm (62.01\")   PainSc: 0-No pain       Estimated body mass index is 28.67 kg/m² as calculated from the following:    Height as of this encounter: 157.5 cm (62.01\").    Weight as of this encounter: 71.1 kg (156 lb 12.8 oz).            Does the patient have evidence of cognitive " impairment? No   ACE III Mini score: 29-no risk of cognitive impairment.                                                                                                Health  Risk Assessment    Smoking Status:  Social History     Tobacco Use   Smoking Status Former    Current packs/day: 0.00    Average packs/day: 1 pack/day for 25.0 years (25.0 ttl pk-yrs)    Types: Cigarettes    Start date: 1968    Quit date: 1992    Years since quittin.6    Passive exposure: Past   Smokeless Tobacco Never     Alcohol Consumption:  Social History     Substance and Sexual Activity   Alcohol Use Yes    Comment: occas       Fall Risk Screen  STEADI Fall Risk Assessment was completed, and patient is at LOW risk for falls.Assessment completed on:2024    Depression Screenin/7/2024     9:05 AM   PHQ-2/PHQ-9 Depression Screening   Little Interest or Pleasure in Doing Things 0-->not at all   Feeling Down, Depressed or Hopeless 0-->not at all   PHQ-9: Brief Depression Severity Measure Score 0     Health Habits and Functional and Cognitive Screenin/5/2024     2:14 PM   Functional & Cognitive Status   Do you have difficulty preparing food and eating? No   Do you have difficulty bathing yourself, getting dressed or grooming yourself? No   Do you have difficulty using the toilet? No   Do you have difficulty moving around from place to place? No   Do you have trouble with steps or getting out of a bed or a chair? No   Current Diet Well Balanced Diet   Dental Exam Up to date   Eye Exam Up to date   Exercise (times per week) 2 times per week   Current Exercises Include Gardening;House Cleaning;Stationary Bicycling/Spin Class;Treadmill;Walking;Weightlifting   Do you need help using the phone?  No   Are you deaf or do you have serious difficulty hearing?  No   Do you need help to go to places out of walking distance? No   Do you need help shopping? No   Do you need help preparing meals?  No   Do you need help with  housework?  No   Do you need help with laundry? No   Do you need help taking your medications? No   Do you need help managing money? No   Do you ever drive or ride in a car without wearing a seat belt? No   Have you felt unusual stress, anger or loneliness in the last month? No   Who do you live with? Alone   If you need help, do you have trouble finding someone available to you? No   Have you been bothered in the last four weeks by sexual problems? No   Do you have difficulty concentrating, remembering or making decisions? No           Age-appropriate Screening Schedule:  Refer to the list below for future screening recommendations based on patient's age, sex and/or medical conditions. Orders for these recommended tests are listed in the plan section. The patient has been provided with a written plan.    Health Maintenance List  Health Maintenance   Topic Date Due    INFLUENZA VACCINE  08/01/2024    TDAP/TD VACCINES (2 - Td or Tdap) 08/07/2024 (Originally 2/22/2022)    COVID-19 Vaccine (6 - 2023-24 season) 08/04/2025 (Originally 3/16/2024)    LIPID PANEL  09/11/2024    MAMMOGRAM  02/19/2025    ANNUAL WELLNESS VISIT  08/07/2025    BMI FOLLOWUP  08/07/2025    DXA SCAN  02/19/2026    COLORECTAL CANCER SCREENING  07/30/2031    HEPATITIS C SCREENING  Completed    Pneumococcal Vaccine 65+  Completed    ZOSTER VACCINE  Discontinued    LUNG CANCER SCREENING  Discontinued                                                                                                                                                CMS Preventative Services Quick Reference  Risk Factors Identified During Encounter  None Identified    The above risks/problems have been discussed with the patient.  Pertinent information has been shared with the patient in the After Visit Summary.  An After Visit Summary and PPPS were made available to the patient.    Follow Up:   Next Medicare Wellness visit to be scheduled in 1 year.         Additional E&M Note  "during same encounter follows:  Patient has additional, significant, and separately identifiable condition(s)/problem(s) that require work above and beyond the Medicare Wellness Visit     Chief Complaint  Medicare Wellness-subsequent    Subjective     HPI  Ashley is also being seen today for additional medical problem/s. Ms. Comer has hyperlipidemia with elevation of LDL . She is compliant with cholesterol medication and does try to follow a low-cholesterol diet.  She is active with exercise.  Will recheck a lipid panel.      Review of Systems   Constitutional:  Negative for chills, fatigue and fever.   HENT:  Negative for congestion and sinus pressure.    Eyes:  Negative for visual disturbance.   Respiratory:  Negative for cough, chest tightness and shortness of breath.    Cardiovascular:  Negative for chest pain and palpitations.   Gastrointestinal:  Negative for abdominal pain, constipation, diarrhea, nausea and vomiting.   Genitourinary:  Negative for dysuria, frequency and urgency.   Musculoskeletal:  Negative for back pain.   Skin:  Negative for rash.   Neurological:  Negative for dizziness, syncope and light-headedness.   Psychiatric/Behavioral:  Negative for behavioral problems and sleep disturbance.         Objective   Vital Signs:  /76 (BP Location: Left arm, Patient Position: Sitting, Cuff Size: Adult)   Pulse 70   Temp 98.3 °F (36.8 °C) (Oral)   Ht 157.5 cm (62.01\")   Wt 71.1 kg (156 lb 12.8 oz)   SpO2 98%   BMI 28.67 kg/m²     BMI is >= 25 and <30. (Overweight) The following options were offered after discussion;: exercise counseling/recommendations and nutrition counseling/recommendations     Physical Exam  Vitals and nursing note reviewed.   Constitutional:       Appearance: She is well-developed and overweight. She is not toxic-appearing.   HENT:      Head: Normocephalic.   Eyes:      General: No scleral icterus.     Pupils: Pupils are equal, round, and reactive to light.   Neck:      " Thyroid: No thyromegaly.      Vascular: No carotid bruit.   Cardiovascular:      Rate and Rhythm: Normal rate and regular rhythm.      Heart sounds: Normal heart sounds.   Pulmonary:      Effort: Pulmonary effort is normal. No respiratory distress.      Breath sounds: Normal breath sounds. No stridor.   Musculoskeletal:         General: No deformity.   Skin:     General: Skin is warm.      Coloration: Skin is not jaundiced.   Neurological:      General: No focal deficit present.      Mental Status: She is alert and oriented to person, place, and time.      Motor: No weakness.      Gait: Gait normal.   Psychiatric:         Behavior: Behavior normal.         Thought Content: Thought content normal.         Judgment: Judgment normal.         The following data was reviewed by: CARMELA Wray on 08/07/2024:  Lipid Panel With LDL / HDL Ratio (09/11/2023 10:45)         Assessment and Plan   Preventative Medicine Visit Discussion:  Loneliness: The patient lives alone, but she remains social with family and friends. She denies loneliness and isolation.          Medicare annual wellness visit, subsequent  Health maintenance updated, labs ordered  Continue healthy diet, exercise   Return in 1 year for annual Medicare wellness    Pure hypercholesterolemia   Lipid abnormalities are elevated    Plan:  Continue same medication/s without change.      Discussed medication dosage, use, side effects, and goals of treatment in detail.    Counseled patient on lifestyle modifications to help control hyperlipidemia.   Cholesterol lowering dietary information shared with patient.  Advised patient to exercise for 150 minutes weekly. (30 minute brisk walk, 5 days a week for example)  Weight Loss encouraged    Patient Treatment Goals:   LDL goal is under 100    Follow up in 6 months.    Orders Placed This Encounter   Procedures    Comprehensive metabolic panel     Order Specific Question:   Release to patient     Answer:   Routine  Release [[id number]]    Lipid panel     Order Specific Question:   Release to patient     Answer:   Routine Release [[id number]]    TSH     Order Specific Question:   Release to patient     Answer:   Routine Release [[id number]]    CBC and Differential     Order Specific Question:   Manual Differential     Answer:   No     Order Specific Question:   Release to patient     Answer:   Routine Release [[id number]]               Follow Up     Return in about 6 months (around 2/7/2025) for Next scheduled follow up.    Patient was given instructions and counseling regarding her condition or for health maintenance advice. Please see specific information pulled into the AVS if appropriate.    Preventative counseling provided during visit regarding healthy diet, exercise, weight loss, and the following:  Low glycemic index diet  Exercise 30 minutes most days of the week  Make sure you get results on any labs or tests we ordered today  We discussed medications and how to take them as prescribed  Sleep 6-8 hours each night if possible    LDL goal <100  HDL goal >60  Triglyceride goal <150  BP goal =<130/80  Fasting glucose <100

## 2024-08-07 NOTE — PATIENT INSTRUCTIONS
Medicare Wellness  Personal Prevention Plan of Service     Date of Office Visit:    Encounter Provider:  CARMELA Wray  Place of Service:  [facility]  Patient Name: Ashley Comer  :  1950    As part of the Medicare Wellness portion of your visit today, we are providing you with this personalized preventive plan of services (PPPS). This plan is based upon recommendations of the [place] Preventive Services Task Force (USPSTF) and the Advisory Committee on Immunization Practices (ACIP).    This lists the preventive care services that should be considered, and provides dates of when you are due. Items listed as completed are up-to-date and do not require any further intervention.    Health Maintenance   Topic Date Due    TDAP/TD VACCINES (2 - Td or Tdap) 2022    COVID-19 Vaccine (6 - - season) 2024    ANNUAL WELLNESS VISIT  2024    INFLUENZA VACCINE  2024    LIPID PANEL  2024    MAMMOGRAM  2025    BMI FOLLOWUP  04/10/2025    DXA SCAN  2026    COLORECTAL CANCER SCREENING  2031    HEPATITIS C SCREENING  Completed    Pneumococcal Vaccine 65+  Completed    ZOSTER VACCINE  Discontinued    LUNG CANCER SCREENING  Discontinued       No orders of the defined types were placed in this encounter.      Return in about 6 months (around 2025) for Next scheduled follow up.

## 2024-08-08 LAB
ALBUMIN SERPL-MCNC: 4.6 G/DL (ref 3.5–5.2)
ALBUMIN/GLOB SERPL: 2.3 G/DL
ALP SERPL-CCNC: 59 U/L (ref 39–117)
ALT SERPL-CCNC: 23 U/L (ref 1–33)
AST SERPL-CCNC: 24 U/L (ref 1–32)
BASOPHILS # BLD AUTO: 0.07 10*3/MM3 (ref 0–0.2)
BASOPHILS NFR BLD AUTO: 0.9 % (ref 0–1.5)
BILIRUB SERPL-MCNC: 0.5 MG/DL (ref 0–1.2)
BUN SERPL-MCNC: 13 MG/DL (ref 8–23)
BUN/CREAT SERPL: 16.9 (ref 7–25)
CALCIUM SERPL-MCNC: 10.1 MG/DL (ref 8.6–10.5)
CHLORIDE SERPL-SCNC: 100 MMOL/L (ref 98–107)
CHOLEST SERPL-MCNC: 228 MG/DL (ref 0–200)
CO2 SERPL-SCNC: 28.2 MMOL/L (ref 22–29)
CREAT SERPL-MCNC: 0.77 MG/DL (ref 0.57–1)
EGFRCR SERPLBLD CKD-EPI 2021: 81.1 ML/MIN/1.73
EOSINOPHIL # BLD AUTO: 0.11 10*3/MM3 (ref 0–0.4)
EOSINOPHIL NFR BLD AUTO: 1.4 % (ref 0.3–6.2)
ERYTHROCYTE [DISTWIDTH] IN BLOOD BY AUTOMATED COUNT: 12.2 % (ref 12.3–15.4)
GLOBULIN SER CALC-MCNC: 2 GM/DL
GLUCOSE SERPL-MCNC: 85 MG/DL (ref 65–99)
HCT VFR BLD AUTO: 44.5 % (ref 34–46.6)
HDLC SERPL-MCNC: 90 MG/DL (ref 40–60)
HGB BLD-MCNC: 14.6 G/DL (ref 12–15.9)
IMM GRANULOCYTES # BLD AUTO: 0.02 10*3/MM3 (ref 0–0.05)
IMM GRANULOCYTES NFR BLD AUTO: 0.3 % (ref 0–0.5)
LDLC SERPL CALC-MCNC: 125 MG/DL (ref 0–100)
LYMPHOCYTES # BLD AUTO: 1.41 10*3/MM3 (ref 0.7–3.1)
LYMPHOCYTES NFR BLD AUTO: 18.4 % (ref 19.6–45.3)
MCH RBC QN AUTO: 30.4 PG (ref 26.6–33)
MCHC RBC AUTO-ENTMCNC: 32.8 G/DL (ref 31.5–35.7)
MCV RBC AUTO: 92.7 FL (ref 79–97)
MONOCYTES # BLD AUTO: 0.61 10*3/MM3 (ref 0.1–0.9)
MONOCYTES NFR BLD AUTO: 8 % (ref 5–12)
NEUTROPHILS # BLD AUTO: 5.44 10*3/MM3 (ref 1.7–7)
NEUTROPHILS NFR BLD AUTO: 71 % (ref 42.7–76)
NRBC BLD AUTO-RTO: 0 /100 WBC (ref 0–0.2)
PLATELET # BLD AUTO: 307 10*3/MM3 (ref 140–450)
POTASSIUM SERPL-SCNC: 4.6 MMOL/L (ref 3.5–5.2)
PROT SERPL-MCNC: 6.6 G/DL (ref 6–8.5)
RBC # BLD AUTO: 4.8 10*6/MM3 (ref 3.77–5.28)
SODIUM SERPL-SCNC: 139 MMOL/L (ref 136–145)
TRIGL SERPL-MCNC: 77 MG/DL (ref 0–150)
TSH SERPL DL<=0.005 MIU/L-ACNC: 1.84 UIU/ML (ref 0.27–4.2)
VLDLC SERPL CALC-MCNC: 13 MG/DL (ref 5–40)
WBC # BLD AUTO: 7.66 10*3/MM3 (ref 3.4–10.8)

## 2024-08-13 ENCOUNTER — TELEPHONE (OUTPATIENT)
Dept: CARDIOLOGY | Facility: CLINIC | Age: 74
End: 2024-08-13

## 2024-08-13 NOTE — TELEPHONE ENCOUNTER
Caller: Ashley Comer    Relationship: Self    Best call back number:  [phone]      PATIENT HAD CT CALCIUM SCORE DONE IN MAY, PCP ADVISED FOLLOW UP WITH CARDIOLOGY WAS NEEDED

## 2024-08-15 NOTE — TELEPHONE ENCOUNTER
08/15/2024    Called and left a voicemail for this patient to return call to schedule this appointment.    Thanks   Siva

## 2024-08-27 DIAGNOSIS — K21.9 GASTROESOPHAGEAL REFLUX DISEASE WITHOUT ESOPHAGITIS: ICD-10-CM

## 2024-08-27 DIAGNOSIS — R07.89 ATYPICAL CHEST PAIN: ICD-10-CM

## 2024-08-27 DIAGNOSIS — K22.4 ESOPHAGEAL SPASM: ICD-10-CM

## 2024-08-27 RX ORDER — PANTOPRAZOLE SODIUM 20 MG/1
20 TABLET, DELAYED RELEASE ORAL DAILY
Qty: 90 TABLET | Refills: 3 | Status: SHIPPED | OUTPATIENT
Start: 2024-08-27

## 2024-09-13 ENCOUNTER — OFFICE VISIT (OUTPATIENT)
Age: 74
End: 2024-09-13
Payer: MEDICARE

## 2024-09-13 VITALS
HEART RATE: 61 BPM | BODY MASS INDEX: 29.08 KG/M2 | HEIGHT: 62 IN | DIASTOLIC BLOOD PRESSURE: 68 MMHG | OXYGEN SATURATION: 98 % | SYSTOLIC BLOOD PRESSURE: 120 MMHG | WEIGHT: 158 LBS

## 2024-09-13 DIAGNOSIS — E78.2 MIXED HYPERLIPIDEMIA: Primary | ICD-10-CM

## 2024-09-13 PROCEDURE — 99214 OFFICE O/P EST MOD 30 MIN: CPT | Performed by: STUDENT IN AN ORGANIZED HEALTH CARE EDUCATION/TRAINING PROGRAM

## 2024-09-13 PROCEDURE — 1159F MED LIST DOCD IN RCRD: CPT | Performed by: STUDENT IN AN ORGANIZED HEALTH CARE EDUCATION/TRAINING PROGRAM

## 2024-09-13 PROCEDURE — 1160F RVW MEDS BY RX/DR IN RCRD: CPT | Performed by: STUDENT IN AN ORGANIZED HEALTH CARE EDUCATION/TRAINING PROGRAM

## 2024-09-13 RX ORDER — ATORVASTATIN CALCIUM 20 MG/1
20 TABLET, FILM COATED ORAL DAILY
Qty: 90 TABLET | Refills: 3 | Status: SHIPPED | OUTPATIENT
Start: 2024-09-13

## 2024-09-13 NOTE — PROGRESS NOTES
Subjective:     Encounter Date:09/13/2024      Patient ID: Ashley Comer is a 73 y.o. female.    Chief Complaint:  Hyperlipidemia    HPI:   73 y.o. female with GERD and hyperlipidemia who presents for follow-up of her hyperlipidemia.  Patient was last seen in October 2023 for evaluation of palpitations and chest discomfort.  Her chest discomfort was believed to be secondary to GERD.  For palpitation she had a Holter monitor placed which revealed normal sinus rhythm with episodes of paroxysmal SVT as well as PACs and PVCs.  She had an echocardiogram performed in June 2023 and this revealed normal EF, grade 1 diastolic dysfunction, moderate MAC and mild to moderate mitral regurgitation.  Given her ASCVD risk we discussed initiation of statin but patient elected to defer and instead continue with Zetia.  Most recent lipid panel revealed LDL in the 120s.  She had a CT calcium score earlier this year that revealed a calcium score in the 20s.    The following portions of the patient's history were reviewed and updated as appropriate: allergies, current medications, past family history, past medical history, past social history, past surgical history and problem list.     REVIEW OF SYSTEMS:   All systems reviewed.  Pertinent positives identified in HPI.  All other systems are negative.    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    Cataract     removed 2014    Colon polyp 02/22/2016    As report to me by Dr Rodriguez    Degenerative disc disease, cervical     Disease of thyroid gland     THYROID NODULES    Edema     Esophageal reflux     GERD (gastroesophageal reflux disease)     Headache     History of GI bleed     History of transfusion 02/2016    6 UNITS    Hyperlipidemia     Knee swelling 2006    Left knee pain     Migraine     Neck pain     Osteopenia     Osteoporosis     Other chest pain      RESOLVED    Scoliosis     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    Urinary tract infection 1970       Family History   Problem Relation Age of Onset    Dementia Mother     Hypertension Mother     Tuberculosis Mother     Pneumonia Mother     Broken bones Mother         Wrist, hip    Osteoporosis Mother     Prostate cancer Father     Tuberculosis Father     Cancer Father         prostate w/bone mets    Ulcerative colitis Sister     Cancer Sister         Throat    Breast cancer Sister     Cancer Sister         Malig Teratoma    Cancer Sister         Breast    Cancer Sister         Breast    Cancer Brother         Prostate    Breast cancer Maternal Aunt     Cancer Maternal Aunt         Breast    Lung cancer Paternal Aunt     Brain cancer Paternal Aunt     Breast cancer Paternal Aunt     Cancer Paternal Aunt         Breast    Cancer Paternal Aunt         Lung    Cancer Paternal Aunt         Lung    Colon polyps Maternal Grandmother     Malig Hyperthermia Neg Hx     Colon cancer Neg Hx     Crohn's disease Neg Hx     Irritable bowel syndrome Neg Hx        Social History     Socioeconomic History    Marital status:      Spouse name: Jimmy    Number of children: 2    Years of education: COLLEGE   Tobacco Use    Smoking status: Former     Current packs/day: 0.00     Average packs/day: 1 pack/day for 25.0 years (25.0 ttl pk-yrs)     Types: Cigarettes     Start date: 1968     Quit date: 1992     Years since quittin.7     Passive exposure: Past    Smokeless tobacco: Never   Vaping Use    Vaping status: Never Used   Substance and Sexual Activity    Alcohol use: Yes     Comment: occas    Drug use: No    Sexual activity: Not Currently     Partners: Male       Allergies   Allergen Reactions    Penicillins Rash     Rash over whole body       Past Surgical History:   Procedure Laterality Date    APPENDECTOMY  2016     CATARACT EXTRACTION Bilateral 2014    COLONOSCOPY N/A 07/28/2017    Procedure: COLONOSCOPY INTO CECUM WITH POLYPECTOMY;  Surgeon: Mini Meraz MD;  [facility];  Service:     COLONOSCOPY N/A 07/30/2021    Procedure: COLONOSCOPY;  Surgeon: Mini Meraz MD;  [facility];  Service: Gastroenterology;  Laterality: N/A;  normal    ENDOSCOPY N/A 07/28/2017    Procedure: ESOPHAGOGASTRODUODENOSCOPY WITH BIOPSIES;  Surgeon: Mini Meraz MD;  [facility];  Service:     ENDOSCOPY N/A 07/30/2021    Procedure: ESOPHAGOGASTRODUODENOSCOPY;  Surgeon: Mini Meraz MD;  [facility];  Service: Gastroenterology;  Laterality: N/A;  gastritis, esophagitis    ENDOSCOPY N/A 6/19/2023    Procedure: ESOPHAGOGASTRODUODENOSCOPY WITH BIOPSY;  Surgeon: Mookie Hawkins MD;  [facility];  Service: Gastroenterology;  Laterality: N/A;  gastric polyps    FOOT SURGERY Right 1954    DEBRIDMENT    JOINT REPLACEMENT  9/30/2022    LAPAROSCOPIC APPENDECTOMY      SMALL INTESTINE SURGERY  02/2016    for GIST     THYROID SURGERY Left 1993    thyroid lobectomy     TOTAL KNEE ARTHROPLASTY Right 09/30/2022    Procedure: TOTAL KNEE ARTHROPLASTY;  Surgeon: Daniele Mcelroy MD;  [place] [facility] [place] [facility] [place];  Service: Orthopedics;  Laterality: Right;    TOTAL KNEE ARTHROPLASTY Left 1/19/2024    Procedure: TOTAL KNEE ARTHROPLASTY;  Surgeon: Daniele Mcelroy MD;  [place] [facility] [place] [facility] [place];  Service: Orthopedics;  Laterality: Left;    VITRECTOMY Right 11/2015       Procedures       Objective:         Vitals:    09/13/24 1342   BP: 120/68   Pulse: 61   SpO2: 98%       PHYSICAL EXAM:  GEN: well appearing, in NAD   HEENT: NCAT, EOMI, moist mucus membranes   Respiratory: CTAB, no wheezes, rales or rhonchi  CV: normal rate, regular rhythm, normal S1, S2, 2/6 systolic murmur in RUSB, rubs, gallops, +2 radial pulses b/l  GI: soft, nontender, nondistended  MSK: no edema  Skin: no rash, warm,  dry  Heme/Lymph: no bruising or bleeding  Neuro: Alert and Oriented x 3, grossly normal motor function        Assessment:         (E78.2) Mixed hyperlipidemia - Plan: Lipid Panel    73 y.o. female with GERD and hyperlipidemia who presents for follow-up of her hyperlipidemia.        Plan:       #Hyperlipidemia  Patient's ASCVD risk is 17%. Recent LDL in the 120s despite zetia. CT Ca score in the 20s.  - discussed initiation of statin therapy and she is agreeable, start atorvastatin 20mg daily, d/c zetia  - lipid panel in 3 months    Manjula garnica, thank you very much for referring this kind patient to me. Please call me with any questions or concerns. I will see the patient again in the office in 6 months or earlier as needed.         Henry Taylor MD  09/13/24  [facility]    Outpatient Encounter Medications as of 9/13/2024   Medication Sig Dispense Refill    acetaminophen (TYLENOL) 500 MG tablet Take 2 tablets by mouth Every 6 (Six) Hours As Needed for Mild Pain. Indications: Pain      cetirizine (zyrTEC) 10 MG tablet Take 1 tablet by mouth Daily As Needed. Indications: Hayfever      cholecalciferol (VITAMIN D3) 25 MCG (1000 UT) tablet       fluticasone (FLONASE) 50 MCG/ACT nasal spray 2 sprays into the nostril(s) as directed by provider Daily As Needed for Allergies. Indications: Allergic Rhinitis      hydroCHLOROthiazide 12.5 MG tablet Take 1 tablet by mouth Daily. for high blood pressure 90 tablet 1    ibandronate (BONIVA) 150 MG tablet Take 1 tablet by mouth Every 30 (Thirty) Days. DUE ON SEPT 24/2022  Indications: Postmenopausal Osteoporosis      pantoprazole (PROTONIX) 20 MG EC tablet TAKE 1 TABLET BY MOUTH DAILY 90 tablet 3    Pediatric Multivitamins-Iron (Flintstones Complete) 18 MG chewable tablet chewable tablet       VITAMIN B COMPLEX-C PO Take 1 tablet by mouth Daily. HOLD PER MD INSTR      [DISCONTINUED] ezetimibe (ZETIA) 10 MG tablet Take 1 tablet by mouth Daily. Indications: High Amount  of Fats in the Blood 90 tablet 1    atorvastatin (LIPITOR) 20 MG tablet Take 1 tablet by mouth Daily. 90 tablet 3    [DISCONTINUED] Calcium Citrate 250 MG tablet        No facility-administered encounter medications on file as of 9/13/2024.

## 2024-09-20 NOTE — ANESTHESIA POSTPROCEDURE EVALUATION
"Patient: Ashley Comer    Procedure Summary       Date: 06/19/23 Room / Location[facility] / [facility]    Anesthesia Start: 1409 Anesthesia Stop: 1425    Procedure: ESOPHAGOGASTRODUODENOSCOPY WITH BIOPSY Diagnosis:       GERD (gastroesophageal reflux disease)      Atypical chest pain      Esophageal spasm      (GERD (gastroesophageal reflux disease) [K21.9])      (Atypical chest pain [R07.89])      (Esophageal spasm [K22.4])    Surgeons: Mookie Hawkins MD Provider: Davin Anderson DO    Anesthesia Type: MAC ASA Status: 2            Anesthesia Type: MAC    Vitals  Vitals Value Taken Time   /80 06/19/23 1445   Temp 37.4 °C (99.3 °F) 06/19/23 1425   Pulse 77 06/19/23 1446   Resp 16 06/19/23 1445   SpO2 95 % 06/19/23 1446   Vitals shown include unvalidated device data.        Post Anesthesia Care and Evaluation    Patient location during evaluation: bedside  Patient participation: complete - patient participated  Level of consciousness: awake and alert  Pain management: adequate    Airway patency: patent  Anesthetic complications: No anesthetic complications  PONV Status: controlled  Cardiovascular status: acceptable and hemodynamically stable  Respiratory status: acceptable, spontaneous ventilation and nonlabored ventilation  Hydration status: acceptable    Comments: /80 (BP Location: Left arm, Patient Position: Lying)   Pulse 71   Temp 37.4 °C (99.3 °F) (Temporal)   Resp 16   Ht 157.5 cm (62\")   Wt 68.9 kg (152 lb)   LMP  (LMP Unknown)   SpO2 95%   BMI 27.80 kg/m²       " Operative Note    Patient: Rosamaria Mcgovern 54 year old female    MRN: [id number]    Surgeon(s): Blu Sparks MD  Phone Number: [phone]                       Surgeons and Role:     * Blu pSarks MD - Primary    Assistant(s): EVI Karimi    Pre-Op Diagnosis: 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  Removal of deep hardware    2.  Calcaneus osteotomy    3.  Stabilization of calcaneus osteotomy nonunion with plate and bone graft.    Anesthesia Type: General                                   Complications: None    Specimens Removed: No specimens collected     Estimated Blood Loss: 10 mL     Assistant Tasks: Opening and closing     Implants:   Implant Name Type Inv. Item Serial No.  Lot No. LRB No. Used Action   PUENTES WEDGE   [id number] [employer]  Left 1 Implanted   low profile    [employer] . N/A Left 1 Implanted   X PLATE    [employer] N/A Left 1 Implanted         Procedure: Preadmit the patient was identified in the preanesthetic area.  Surgical site marked.  IV antibiotics given within 1 hour of incision.    She was brought to the  operating room and laid in supine position.  General anesthesia was given.  Left lower extremity was exsanguinated and tourniquet elevated to 275 mmHg.    Procedure was begun by opening her prior lateral incision over the anterior process of the calcaneus.  Skin and subcutaneous tissues were incised the length of approximately 6 cm.  Peroneal tendons were identified and retracted for the entirety of the case, protecting the.  The calcaneocuboid joint was identified.  The prior calcaneal osteotomy site was identified and it was evident that the prior implant and, [employer] sync, had fractured.  We removed the locking screws and then the implant itself, removed, which came out piecemeal.  The calcaneocuboid joint had subluxed.  After  cleaning the bony surfaces of the calcaneus we reduced the anterior process of the calcaneus to the cuboid, reducing the calcaneal cuboid joint subluxation.  This was held in place with 0.062 K wires.  We measured for the gap in the anterior process of the calcaneus and i used an allograft Ar[employer]rex Allosync implant to fill this.  It was seated in place.  I then obtained a 4-hole plate and placed this over the top of our graft, bridging the osteotomy.  This was carefully affixed in place screws.  After doing so we took final images confirming satisfactory reduction of the hindfoot and placement of our graft.  Our K wires were cut short and pin caps applied.  Irrigated and closed with 0 Vicryl, 2-0 Vicryl, staples.  Wounds infiltrated with quarter percent Marcaine plain.  General dressings applied.  Posterior mold splint applied.    Patient taught procedure well.

## 2024-11-26 NOTE — PROGRESS NOTES
.     REASONS FOR FOLLOWUP: Small bowel GIST.  Iron deficiency anemia    HISTORY OF PRESENT ILLNESS:  The patient is a 74 y.o. year old female  who is here for follow-up with the above-mentioned history.    No bleeding.  No new issues.  Continues donating blood roughly every 3 months    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    Cataract     removed 2014    Colon polyp 02/22/2016    As report to me by Dr Rodriguez    Degenerative disc disease, cervical     Disease of thyroid gland     THYROID NODULES    Edema     Esophageal reflux     GERD (gastroesophageal reflux disease)     Headache     History of GI bleed     History of transfusion 02/2016    6 UNITS    HL (hearing loss) 09/20/23    Hyperlipidemia     Kidney stone 2003    Knee swelling 2006    Left knee pain     Migraine     Neck pain     Osteopenia     Osteoporosis     Other chest pain     RESOLVED    Scoliosis     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    Urinary tract infection 1970     Past Surgical History:   Procedure Laterality Date    APPENDECTOMY  02/23/2016    CATARACT EXTRACTION Bilateral 2014    COLONOSCOPY N/A 07/28/2017    Procedure: COLONOSCOPY INTO CECUM WITH POLYPECTOMY;  Surgeon: Mini Meraz MD;  Location: [facility] ENDOSCOPY;  Service:     COLONOSCOPY N/A 07/30/2021    Procedure: COLONOSCOPY;  Surgeon: Mini Meraz MD;  [place] [facility] [place]  Service: Gastroenterology;  Laterality: N/A;  normal    ENDOSCOPY N/A 07/28/2017    Procedure: ESOPHAGOGASTRODUODENOSCOPY WITH BIOPSIES;  Surgeon: Mini Meraz MD;  Location: [facility] ENDOSCOPY;  Service:     ENDOSCOPY N/A 07/30/2021    Procedure: ESOPHAGOGASTRODUODENOSCOPY;  Surgeon: Mini Meraz MD;  [place] [facility] [place]  Service: Gastroenterology;  Laterality: N/A;   gastritis, esophagitis    ENDOSCOPY N/A 6/19/2023    Procedure: ESOPHAGOGASTRODUODENOSCOPY WITH BIOPSY;  Surgeon: Mookie Hawkins MD;  [facility];  Service: Gastroenterology;  Laterality: N/A;  gastric polyps    FOOT SURGERY Right 1954    DEBRIDMENT    JOINT REPLACEMENT  9/30/2022    LAPAROSCOPIC APPENDECTOMY      SMALL INTESTINE SURGERY  02/2016    for GIST     THYROID SURGERY Left 1993    thyroid lobectomy     TOTAL KNEE ARTHROPLASTY Right 09/30/2022    Procedure: TOTAL KNEE ARTHROPLASTY;  Surgeon: Daniele Mcelroy MD;  [place]: [facility] [place] [facility] [place];  Service: Orthopedics;  Laterality: Right;    TOTAL KNEE ARTHROPLASTY Left 1/19/2024    Procedure: TOTAL KNEE ARTHROPLASTY;  Surgeon: Daniele Mcelroy MD;  Location: [facility] [place] [facility] [place] [place];  Service: Orthopedics;  Laterality: Left;    VITRECTOMY Right 11/2015       MEDICATIONS    Current Outpatient Medications:     acetaminophen (TYLENOL) 500 MG tablet, Take 2 tablets by mouth Every 6 (Six) Hours As Needed for Mild Pain. Indications: Pain, Disp: , Rfl:     atorvastatin (LIPITOR) 20 MG tablet, Take 1 tablet by mouth Daily., Disp: 90 tablet, Rfl: 3    cetirizine (zyrTEC) 10 MG tablet, Take 1 tablet by mouth Daily As Needed. Indications: Hayfever, Disp: , Rfl:     cholecalciferol (VITAMIN D3) 25 MCG (1000 UT) tablet, , Disp: , Rfl:     fluticasone (FLONASE) 50 MCG/ACT nasal spray, 2 sprays into the nostril(s) as directed by provider Daily As Needed for Allergies. Indications: Allergic Rhinitis, Disp: , Rfl:     hydroCHLOROthiazide 12.5 MG tablet, Take 1 tablet by mouth Daily. for high blood pressure, Disp: 90 tablet, Rfl: 1    ibandronate (BONIVA) 150 MG tablet, Take 1 tablet by mouth Every 30 (Thirty) Days. DUE ON SEPT 24/2022  Indications: Postmenopausal Osteoporosis, Disp: , Rfl:     pantoprazole (PROTONIX) 20 MG EC tablet, TAKE 1 TABLET BY MOUTH DAILY, Disp: 90 tablet, Rfl: 3    Pediatric Multivitamins-Iron (Flintstones Complete) 18 MG chewable tablet chewable  tablet, , Disp: , Rfl:     VITAMIN B COMPLEX-C PO, Take 1 tablet by mouth Daily. HOLD PER MD INSTR, Disp: , Rfl:     ALLERGIES:     Allergies   Allergen Reactions    Penicillins Rash     Rash over whole body       SOCIAL HISTORY:       Social History     Socioeconomic History    Marital status:      Spouse name: Jimmy    Number of children: 2    Years of education: COLLEGE   Tobacco Use    Smoking status: Former     Current packs/day: 0.00     Average packs/day: 1 pack/day for 25.0 years (25.0 ttl pk-yrs)     Types: Cigarettes     Start date: 1968     Quit date: 1992     Years since quittin.9     Passive exposure: Past    Smokeless tobacco: Never   Vaping Use    Vaping status: Never Used   Substance and Sexual Activity    Alcohol use: Yes     Alcohol/week: 4.0 standard drinks of alcohol     Types: 4 Glasses of wine per week     Comment: occas    Drug use: No    Sexual activity: Not Currently     Partners: Male         FAMILY HISTORY:  Family History   Problem Relation Age of Onset    Dementia Mother     Hypertension Mother     Tuberculosis Mother     Pneumonia Mother     Broken bones Mother         Wrist, hip    Osteoporosis Mother     Hyperlipidemia Mother     Prostate cancer Father     Tuberculosis Father     Cancer Father         prostate w/bone mets    Ulcerative colitis Sister     Cancer Sister         Throat    Breast cancer Sister     Cancer Sister         Malig Teratoma    Cancer Sister         Breast    Cancer Sister         Breast    Cancer Brother         Prostate    Breast cancer Maternal Aunt     Cancer Maternal Aunt         Breast    Lung cancer Paternal Aunt     Brain cancer Paternal Aunt     Breast cancer Paternal Aunt     Cancer Paternal Aunt         Breast    Cancer Paternal Aunt         Lung    Cancer Paternal Aunt         Lung    Colon polyps Maternal Grandmother     Malig Hyperthermia Neg Hx     Colon cancer Neg Hx     Crohn's disease Neg Hx     Irritable bowel syndrome Neg  "Hx        REVIEW OF SYSTEMS:  Review of Systems   Constitutional:  Negative for activity change.   HENT:  Negative for nosebleeds and trouble swallowing.    Respiratory:  Negative for shortness of breath and wheezing.    Cardiovascular:  Negative for chest pain and palpitations.   Gastrointestinal:  Negative for constipation, diarrhea and nausea.   Genitourinary:  Negative for dysuria and hematuria.   Musculoskeletal:  Negative for arthralgias and myalgias.   Skin:  Negative for rash and wound.   Neurological:  Negative for seizures and syncope.   Hematological:  Negative for adenopathy. Does not bruise/bleed easily.   Psychiatric/Behavioral:  Negative for confusion.             Vitals:    12/02/24 1319   BP: 148/78   Pulse: 63   Resp: 16   Temp: 97.6 °F (36.4 °C)   TempSrc: Oral   SpO2: 95%   Weight: 72.6 kg (160 lb 1.6 oz)   Height: 157.7 cm (62.07\")   PainSc: 0-No pain         12/2/2024     1:19 PM   Current Status   ECOG score 0        PHYSICAL EXAM:        CONSTITUTIONAL:  Vital signs reviewed.  No distress, looks comfortable.  EYES:  Conjunctiva and lids unremarkable.  PERRLA  EARS,NOSE,MOUTH,THROAT:  Ears and nose appear unremarkable.  Lips, teeth, gums appear unremarkable.  RESPIRATORY:  Normal respiratory effort.  Lungs clear to auscultation bilaterally.  CARDIOVASCULAR:  Normal S1, S2.  No murmurs rubs or gallops.  No significant lower extremity edema.  GASTROINTESTINAL: Abdomen appears unremarkable.  Nontender.  No hepatomegaly.  No splenomegaly.  LYMPHATIC:  No cervical, supraclavicular, axillary lymphadenopathy.  SKIN:  Warm.  No rashes.  PSYCHIATRIC:  Normal judgment and insight.  Normal mood and affect.          RECENT LABS:        WBC   Date/Time Value Ref Range Status   12/02/2024 12:41 PM 6.65 3.40 - 10.80 10*3/mm3 Final   08/07/2024 09:45 AM 7.66 3.40 - 10.80 10*3/mm3 Final     Hemoglobin   Date/Time Value Ref Range Status   12/02/2024 12:41 PM 12.7 12.0 - 15.9 g/dL Final     Platelets   Date/Time " Value Ref Range Status   12/02/2024 12:41  140 - 450 10*3/mm3 Final       Assessment & Plan   Iron deficiency anemia, unspecified iron deficiency anemia type  - CBC & Differential  - Ferritin  - Iron Profile  - Retic With IRF & RET-He  - Vitamin B12  - Ferritin  - Iron Profile  - Retic With IRF & RET-He  - CBC & Differential        Ashley Comer   *GIST of small bowel  Resected 2/22/2016 by Dr. Meraz with negative margins.  Low-grade with good prognostic features.  Dr. Mejias stopped surveillance CT after 5 years follow-up  11/26/2024: Initial visit with me (prior patient of Dr. Mejias's).  No clinical signs of recurrence.    *Iron deficiency anemia  Takes Joppa vitamins,  12/2/2024: Hb 12.7.  Ferritin 53, 24% saturation.  She does not like the taste of Joppa vitamins.  She plans to switch to ferrous sulfate once or twice per week (continues donating blood roughly every 3 months).    *Source of iron deficiency  Reportedly EGD showed gastritis.  Patient was taking NSAIDs.  Patient was also donating blood.  12/2/2024: Continues donating blood, roughly every 3 months    *Overweight.  Being overweight can lead to cytopenias through hepatic steatosis.  Body mass index is 29.22 kg/m².  BMI 25 to <30 is overweight  BMI 30 to <35 is class 1 obesity  BMI 35 to <40 is class 2 obesity  BMI 40 or higher is class 3 obesity   Ideally, lose weight    Plan  MD 1 year.  Stat labs same day: Ferritin, iron panel, CBC, reticulated hemoglobin  She plans to switch her daily Joppa vitamin to ferrous sulfate once or twice per week because she does not like the taste of the Joppa vitamins (continues donating blood)    I am following her longitudinally

## 2024-11-27 ENCOUNTER — OFFICE VISIT (OUTPATIENT)
Dept: FAMILY MEDICINE CLINIC | Facility: CLINIC | Age: 74
End: 2024-11-27
Payer: COMMERCIAL

## 2024-11-27 VITALS
OXYGEN SATURATION: 99 % | SYSTOLIC BLOOD PRESSURE: 118 MMHG | WEIGHT: 159 LBS | RESPIRATION RATE: 16 BRPM | HEART RATE: 85 BPM | HEIGHT: 62 IN | BODY MASS INDEX: 29.26 KG/M2 | DIASTOLIC BLOOD PRESSURE: 68 MMHG | TEMPERATURE: 97.9 F

## 2024-11-27 DIAGNOSIS — R82.90 MALODOROUS URINE: Primary | ICD-10-CM

## 2024-11-27 LAB
BILIRUB BLD-MCNC: NEGATIVE MG/DL
CLARITY, POC: CLEAR
COLOR UR: YELLOW
EXPIRATION DATE: NORMAL
GLUCOSE UR STRIP-MCNC: NEGATIVE MG/DL
KETONES UR QL: NEGATIVE
LEUKOCYTE EST, POC: NEGATIVE
Lab: NORMAL
NITRITE UR-MCNC: NEGATIVE MG/ML
PH UR: 5.5 [PH] (ref 5–8)
PROT UR STRIP-MCNC: NEGATIVE MG/DL
RBC # UR STRIP: NEGATIVE /UL
SP GR UR: 1.01 (ref 1–1.03)
UROBILINOGEN UR QL: NORMAL

## 2024-11-27 PROCEDURE — 81003 URINALYSIS AUTO W/O SCOPE: CPT | Performed by: FAMILY MEDICINE

## 2024-11-27 PROCEDURE — 99212 OFFICE O/P EST SF 10 MIN: CPT | Performed by: FAMILY MEDICINE

## 2024-11-27 NOTE — PROGRESS NOTES
"Subjective   Ashley Comer is a 74 y.o. female.     CC: Urinary Issue    History of Present Illness     Patient with previous history of kidney stones, under the care of [facility] with Dr. Minh Arenas, comes in today reporting an approximately 8-month history of an abnormal smell to her urine. Pt reports the urine is always clear (not discolored). Pt has been on the same medications for quite some time except for the addition of atorvastatin back in September.      The following portions of the patient's history were reviewed and updated as appropriate: allergies, current medications, past family history, past medical history, past social history, past surgical history, and problem list.    Review of Systems   Constitutional:  Negative for activity change, chills, diaphoresis, fatigue and fever.   HENT:  Negative for congestion and sore throat.    Respiratory:  Negative for cough.    Cardiovascular:  Negative for chest pain.   Gastrointestinal:  Negative for abdominal pain, nausea and vomiting.   Genitourinary:  Negative for dysuria.   Musculoskeletal:  Negative for myalgias and neck pain.   Skin:  Negative for rash.   Neurological:  Negative for weakness, numbness and headaches.   Psychiatric/Behavioral:  Negative for dysphoric mood.        /68   Pulse 85   Temp 97.9 °F (36.6 °C) (Oral)   Resp 16   Ht 157.5 cm (62\")   Wt 72.1 kg (159 lb)   LMP  (LMP Unknown)   SpO2 99%   BMI 29.08 kg/m²     Objective   Physical Exam  Vitals and nursing note reviewed.   Constitutional:       General: She is not in acute distress.     Appearance: She is well-developed.   Pulmonary:      Effort: Pulmonary effort is normal.   Neurological:      Mental Status: She is alert and oriented to person, place, and time.   Psychiatric:         Behavior: Behavior normal.         Thought Content: Thought content normal.     U/A: completely normal    Assessment & Plan   Diagnoses and all orders for this visit:    1. " Malodorous urine (Primary)  -     POC Urinalysis Dipstick, Automated    Discussed good water intake and what vitamins and foods can affect urine smell.  Patient to check in with her urologist if symptoms persist.

## 2024-12-02 ENCOUNTER — OFFICE VISIT (OUTPATIENT)
Dept: ONCOLOGY | Facility: CLINIC | Age: 74
End: 2024-12-02
Payer: MEDICARE

## 2024-12-02 ENCOUNTER — LAB (OUTPATIENT)
Dept: LAB | Facility: HOSPITAL | Age: 74
End: 2024-12-02
Payer: MEDICARE

## 2024-12-02 VITALS
HEIGHT: 62 IN | TEMPERATURE: 97.6 F | OXYGEN SATURATION: 95 % | WEIGHT: 160.1 LBS | SYSTOLIC BLOOD PRESSURE: 148 MMHG | HEART RATE: 63 BPM | BODY MASS INDEX: 29.46 KG/M2 | DIASTOLIC BLOOD PRESSURE: 78 MMHG | RESPIRATION RATE: 16 BRPM

## 2024-12-02 DIAGNOSIS — D50.9 IRON DEFICIENCY ANEMIA, UNSPECIFIED IRON DEFICIENCY ANEMIA TYPE: ICD-10-CM

## 2024-12-02 DIAGNOSIS — D50.9 IRON DEFICIENCY ANEMIA, UNSPECIFIED IRON DEFICIENCY ANEMIA TYPE: Primary | ICD-10-CM

## 2024-12-02 LAB
BASOPHILS # BLD AUTO: 0.06 10*3/MM3 (ref 0–0.2)
BASOPHILS NFR BLD AUTO: 0.9 % (ref 0–1.5)
DEPRECATED RDW RBC AUTO: 43.2 FL (ref 37–54)
EOSINOPHIL # BLD AUTO: 0.12 10*3/MM3 (ref 0–0.4)
EOSINOPHIL NFR BLD AUTO: 1.8 % (ref 0.3–6.2)
ERYTHROCYTE [DISTWIDTH] IN BLOOD BY AUTOMATED COUNT: 12.6 % (ref 12.3–15.4)
FERRITIN SERPL-MCNC: 53.3 NG/ML (ref 13–150)
HCT VFR BLD AUTO: 39 % (ref 34–46.6)
HGB BLD-MCNC: 12.7 G/DL (ref 12–15.9)
HGB RETIC QN AUTO: 32.5 PG (ref 29.8–36.1)
IMM GRANULOCYTES # BLD AUTO: 0.01 10*3/MM3 (ref 0–0.05)
IMM GRANULOCYTES NFR BLD AUTO: 0.2 % (ref 0–0.5)
IMM RETICS NFR: 9.6 % (ref 3–15.8)
IRON 24H UR-MRATE: 94 MCG/DL (ref 37–145)
IRON SATN MFR SERPL: 24 % (ref 20–50)
LYMPHOCYTES # BLD AUTO: 1.53 10*3/MM3 (ref 0.7–3.1)
LYMPHOCYTES NFR BLD AUTO: 23 % (ref 19.6–45.3)
MCH RBC QN AUTO: 30.5 PG (ref 26.6–33)
MCHC RBC AUTO-ENTMCNC: 32.6 G/DL (ref 31.5–35.7)
MCV RBC AUTO: 93.5 FL (ref 79–97)
MONOCYTES # BLD AUTO: 0.57 10*3/MM3 (ref 0.1–0.9)
MONOCYTES NFR BLD AUTO: 8.6 % (ref 5–12)
NEUTROPHILS NFR BLD AUTO: 4.36 10*3/MM3 (ref 1.7–7)
NEUTROPHILS NFR BLD AUTO: 65.5 % (ref 42.7–76)
NRBC BLD AUTO-RTO: 0 /100 WBC (ref 0–0.2)
PLATELET # BLD AUTO: 293 10*3/MM3 (ref 140–450)
PMV BLD AUTO: 9 FL (ref 6–12)
RBC # BLD AUTO: 4.17 10*6/MM3 (ref 3.77–5.28)
RETICS # AUTO: 0.09 10*6/MM3 (ref 0.02–0.13)
RETICS/RBC NFR AUTO: 2.08 % (ref 0.7–1.9)
TIBC SERPL-MCNC: 384 MCG/DL (ref 298–536)
TRANSFERRIN SERPL-MCNC: 258 MG/DL (ref 200–360)
VIT B12 BLD-MCNC: 668 PG/ML (ref 211–946)
WBC NRBC COR # BLD AUTO: 6.65 10*3/MM3 (ref 3.4–10.8)

## 2024-12-02 PROCEDURE — 99214 OFFICE O/P EST MOD 30 MIN: CPT | Performed by: INTERNAL MEDICINE

## 2024-12-02 PROCEDURE — 36415 COLL VENOUS BLD VENIPUNCTURE: CPT

## 2024-12-02 PROCEDURE — 84466 ASSAY OF TRANSFERRIN: CPT

## 2024-12-02 PROCEDURE — 85025 COMPLETE CBC W/AUTO DIFF WBC: CPT

## 2024-12-02 PROCEDURE — 1126F AMNT PAIN NOTED NONE PRSNT: CPT | Performed by: INTERNAL MEDICINE

## 2024-12-02 PROCEDURE — 82728 ASSAY OF FERRITIN: CPT

## 2024-12-02 PROCEDURE — G2211 COMPLEX E/M VISIT ADD ON: HCPCS | Performed by: INTERNAL MEDICINE

## 2024-12-02 PROCEDURE — 83540 ASSAY OF IRON: CPT

## 2024-12-02 PROCEDURE — 85046 RETICYTE/HGB CONCENTRATE: CPT

## 2024-12-02 PROCEDURE — 82607 VITAMIN B-12: CPT | Performed by: INTERNAL MEDICINE

## 2024-12-07 DIAGNOSIS — Z96.651 STATUS POST RIGHT KNEE REPLACEMENT: Primary | ICD-10-CM

## 2024-12-09 DIAGNOSIS — E78.2 MIXED HYPERLIPIDEMIA: Primary | ICD-10-CM

## 2024-12-09 RX ORDER — CLINDAMYCIN HYDROCHLORIDE 300 MG/1
CAPSULE ORAL
Qty: 2 CAPSULE | Refills: 5 | Status: SHIPPED | OUTPATIENT
Start: 2024-12-09

## 2024-12-11 DIAGNOSIS — E78.2 MIXED HYPERLIPIDEMIA: Primary | ICD-10-CM

## 2024-12-19 ENCOUNTER — LAB (OUTPATIENT)
Dept: LAB | Facility: HOSPITAL | Age: 74
End: 2024-12-19
Payer: MEDICARE

## 2024-12-19 DIAGNOSIS — E78.2 MIXED HYPERLIPIDEMIA: ICD-10-CM

## 2024-12-19 LAB
CHOLEST SERPL-MCNC: 188 MG/DL (ref 0–200)
HDLC SERPL-MCNC: 101 MG/DL (ref 40–60)
LDLC SERPL CALC-MCNC: 75 MG/DL (ref 0–100)
LDLC/HDLC SERPL: 0.73 {RATIO}
TRIGL SERPL-MCNC: 64 MG/DL (ref 0–150)
VLDLC SERPL-MCNC: 12 MG/DL (ref 5–40)

## 2024-12-19 PROCEDURE — 80061 LIPID PANEL: CPT

## 2024-12-19 PROCEDURE — 36415 COLL VENOUS BLD VENIPUNCTURE: CPT

## 2025-01-20 ENCOUNTER — OFFICE VISIT (OUTPATIENT)
Dept: ORTHOPEDIC SURGERY | Facility: CLINIC | Age: 75
End: 2025-01-20
Payer: MEDICARE

## 2025-01-20 DIAGNOSIS — Z96.652 STATUS POST LEFT KNEE REPLACEMENT: ICD-10-CM

## 2025-01-20 DIAGNOSIS — R52 PAIN: Primary | ICD-10-CM

## 2025-01-20 PROCEDURE — 99212 OFFICE O/P EST SF 10 MIN: CPT | Performed by: NURSE PRACTITIONER

## 2025-01-20 NOTE — PROGRESS NOTES
Ashley Comer : 1950 MRN: [id number] DATE: 2025    DIAGNOSIS: Annual follow up left total knee      SUBJECTIVE:Patient returns today for  1 year follow up of left total knee replacement. Patient reports doing well with no unusual complaints. Denies any limitations due to the knee.    OBJECTIVE:    LMP  (LMP Unknown)   Family History   Problem Relation Age of Onset    Dementia Mother     Hypertension Mother     Tuberculosis Mother     Pneumonia Mother     Broken bones Mother         Wrist, hip    Osteoporosis Mother     Hyperlipidemia Mother     Prostate cancer Father     Tuberculosis Father     Cancer Father         prostate w/bone mets    Ulcerative colitis Sister     Cancer Sister         Throat    Breast cancer Sister     Cancer Sister         Malig Teratoma    Cancer Sister         Breast    Cancer Sister         Breast    Cancer Brother         Prostate    Breast cancer Maternal Aunt     Cancer Maternal Aunt         Breast    Lung cancer Paternal Aunt     Brain cancer Paternal Aunt     Breast cancer Paternal Aunt     Cancer Paternal Aunt         Breast    Cancer Paternal Aunt         Lung    Cancer Paternal Aunt         Lung    Colon polyps Maternal Grandmother     Malig Hyperthermia Neg Hx     Colon cancer Neg Hx     Crohn's disease Neg Hx     Irritable bowel syndrome Neg Hx      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    Cataract     removed 2014    Colon polyp 2016    As report to me by Dr Rodriguez    Degenerative disc disease, cervical     Disease of thyroid gland     THYROID NODULES    Edema     Esophageal reflux     GERD (gastroesophageal reflux disease)     Headache     History of GI bleed     History of transfusion 2016    6 UNITS    HL (hearing loss) 23    Hyperlipidemia     Kidney stone 2003    Knee swelling 2006    Left knee pain     Migraine      Neck pain     Osteopenia     Osteoporosis     Other chest pain     RESOLVED    Scoliosis     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    Urinary tract infection 1970     Past Surgical History:   Procedure Laterality Date    APPENDECTOMY  02/23/2016    CATARACT EXTRACTION Bilateral 2014    COLONOSCOPY N/A 07/28/2017    Procedure: COLONOSCOPY INTO CECUM WITH POLYPECTOMY;  Surgeon: Mini Meraz MD;  Location: [facility] ENDOSCOPY;  Service:     COLONOSCOPY N/A 07/30/2021    Procedure: COLONOSCOPY;  Surgeon: Mini Meraz MD;  [place] [facility] [place];  Service: Gastroenterology;  Laterality: N/A;  normal    ENDOSCOPY N/A 07/28/2017    Procedure: ESOPHAGOGASTRODUODENOSCOPY WITH BIOPSIES;  Surgeon: Mini Meraz MD;  [facility] END[facility]COPY;  Service:     ENDOSCOPY N/A 07/30/2021    Procedure: ESOPHAGOGASTRODUODENOSCOPY;  Surgeon: Mini Meraz MD;  Location: [facility] [place] [place];  Service: Gastroenterology;  Laterality: N/A;  gastritis, esophagitis    ENDOSCOPY N/A 6/19/2023    Procedure: ESOPHAGOGASTRODUODENOSCOPY WITH BIOPSY;  Surgeon: Mookie Hawkins MD;  Location: [facility] [place];  Service: Gastroenterology;  Laterality: N/A;  gastric polyps    FOOT SURGERY Right 1954    DEBRIDMENT    JOINT REPLACEMENT  9/30/2022    LAPAROSCOPIC APPENDECTOMY      SMALL INTESTINE SURGERY  02/2016    for GIST     THYROID SURGERY Left 1993    thyroid lobectomy     TOTAL KNEE ARTHROPLASTY Right 09/30/2022    Procedure: TOTAL KNEE ARTHROPLASTY;  Surgeon: Daniele Mcelroy MD;  Location: [facility] OR [facility];  Service: Orthopedics;  Laterality: Right;    TOTAL KNEE ARTHROPLASTY Left 1/19/2024    Procedure: TOTAL KNEE ARTHROPLASTY;  Surgeon: Daniele Mcelroy MD;  Location: [facility] OR [facility] [place];  Service: Orthopedics;  Laterality: Left;    VITRECTOMY Right 11/2015     Social History     Socioeconomic History    Marital status:      Spouse name: Jimmy     Number of children: 2    Years of education: COLLEGE   Tobacco Use    Smoking status: Former     Current packs/day: 0.00     Average packs/day: 1 pack/day for 25.0 years (25.0 ttl pk-yrs)     Types: Cigarettes     Start date: 1968     Quit date: 1992     Years since quittin.0     Passive exposure: Past    Smokeless tobacco: Never   Vaping Use    Vaping status: Never Used   Substance and Sexual Activity    Alcohol use: Yes     Alcohol/week: 4.0 standard drinks of alcohol     Types: 4 Glasses of wine per week     Comment: occas    Drug use: No    Sexual activity: Not Currently     Partners: Male     Review of Systems - a 14 point review of systems was performed. All systems were negative.    Exam:. The incision is well healed. Range of motion is measured at 0 to 122. The calf is soft and nontender with a negative Homans sign. Alignment is neutral. Good quad strength. There is no evidence of varus/valgus or flexion instability. No effusion. Intact to light touch with palpable distal pulses.     DIAGNOSTIC STUDIES  Xrays: 3 views of the left knee (AP, lateral, and sunrise) were ordered and reviewed for evaluation of recent knee replacement. They demonstrate a well positioned, well aligned knee replacement without complicating factors noted. In comparison with previous films there has been no change.    ASSESSMENT: Annual follow up left knee replacement.    PLAN:  Continue activities as tolerated    Follow up prn    Leona Munoz, APRN  2025

## 2025-01-23 DIAGNOSIS — M25.472 LEFT ANKLE SWELLING: ICD-10-CM

## 2025-01-23 RX ORDER — HYDROCHLOROTHIAZIDE 12.5 MG/1
12.5 TABLET ORAL DAILY
Qty: 90 TABLET | Refills: 1 | Status: SHIPPED | OUTPATIENT
Start: 2025-01-23

## 2025-01-23 NOTE — TELEPHONE ENCOUNTER
Rx Refill Note  Requested Prescriptions     Pending Prescriptions Disp Refills    hydroCHLOROthiazide 12.5 MG tablet [Pharmacy Med Name: hydroCHLOROthiazide 12.5 MG TABLET] 90 tablet 1     Sig: TAKE 1 TABLET BY MOUTH DAILY FOR HIGH BLOOD PRESSURE      Last office visit with prescribing clinician: 8/7/2024   Last telemedicine visit with prescribing clinician: Visit date not found   Next office visit with prescribing clinician: 2/7/2025                         Would you like a call back once the refill request has been completed: [] Yes [] No    If the office needs to give you a call back, can they leave a voicemail: [] Yes [] No    Alexandra Bell MA  01/23/25, 13:39 EST

## 2025-02-07 ENCOUNTER — OFFICE VISIT (OUTPATIENT)
Dept: FAMILY MEDICINE CLINIC | Facility: CLINIC | Age: 75
End: 2025-02-07
Payer: MEDICARE

## 2025-02-07 VITALS
DIASTOLIC BLOOD PRESSURE: 70 MMHG | BODY MASS INDEX: 29.7 KG/M2 | TEMPERATURE: 98.1 F | SYSTOLIC BLOOD PRESSURE: 120 MMHG | HEART RATE: 70 BPM | OXYGEN SATURATION: 99 % | HEIGHT: 62 IN | WEIGHT: 161.4 LBS

## 2025-02-07 DIAGNOSIS — Z87.891 FORMER SMOKER: ICD-10-CM

## 2025-02-07 DIAGNOSIS — E78.00 PURE HYPERCHOLESTEROLEMIA: Primary | ICD-10-CM

## 2025-02-07 PROCEDURE — 1126F AMNT PAIN NOTED NONE PRSNT: CPT

## 2025-02-07 PROCEDURE — 99214 OFFICE O/P EST MOD 30 MIN: CPT

## 2025-02-07 PROCEDURE — G2211 COMPLEX E/M VISIT ADD ON: HCPCS

## 2025-02-07 PROCEDURE — 1159F MED LIST DOCD IN RCRD: CPT

## 2025-02-07 PROCEDURE — 1160F RVW MEDS BY RX/DR IN RCRD: CPT

## 2025-02-07 NOTE — PROGRESS NOTES
"Chief Complaint  Hyperlipidemia    Subjective          Hyperlipidemia      History of Present Illness      Ashley Comer 74 y.o. female presents for medical management. Since the last visit, she has overall felt well. She has Hyperlipidemia with goals met with current Rx. She has been compliant with current medications, and I have reviewed them. The patient denies medication side effects. Will refill medications. She is asymptomatic.             Objective   Vital Signs:   /70 (BP Location: Left arm, Patient Position: Sitting, Cuff Size: Adult)   Pulse 70   Temp 98.1 °F (36.7 °C) (Oral)   Ht 157.7 cm (62.07\")   Wt 73.2 kg (161 lb 6.4 oz)   SpO2 99%   BMI 29.45 kg/m²      BMI is >= 25 and <30. (Overweight) The following options were offered after discussion;: exercise counseling/recommendations and nutrition counseling/recommendations       Physical Exam  Vitals and nursing note reviewed.   Constitutional:       General: She is not in acute distress.     Appearance: She is well-developed and overweight.   HENT:      Head: Normocephalic.   Eyes:      General: No scleral icterus.     Pupils: Pupils are equal, round, and reactive to light.   Neck:      Vascular: No carotid bruit.   Cardiovascular:      Rate and Rhythm: Normal rate and regular rhythm.      Heart sounds: Normal heart sounds.   Pulmonary:      Effort: Pulmonary effort is normal. No respiratory distress.      Breath sounds: Normal breath sounds. No stridor.   Skin:     General: Skin is warm.      Coloration: Skin is not jaundiced.   Neurological:      General: No focal deficit present.      Mental Status: She is alert and oriented to person, place, and time.      Motor: No weakness.      Gait: Gait normal.   Psychiatric:         Mood and Affect: Mood normal.         Behavior: Behavior normal.        Physical Exam      The following data was reviewed by: CARMELA Wray on 02/07/2025:  Lipid Panel (12/19/2024 10:44)   CT Chest Low Dose Wo " (05/05/2024 11:04)   Results                 Assessment and Plan    Assessment & Plan      Assessment & Plan  Pure hypercholesterolemia   Lipid abnormalities are stable    Plan:  Continue same medication/s without change.    Discussed medication dosage, use, side effects, and goals of treatment in detail.    Counseled patient on lifestyle modifications to help control hyperlipidemia.   Cholesterol lowering dietary information shared with patient.  Advised patient to exercise for 150 minutes weekly. (30 minute brisk walk, 5 days a week for example)  Weight Loss encouraged    Patient Treatment Goals:   LDL goal is under 100    Follow up in 6 months.         Former smoker    Orders:     CT Chest Low Dose Cancer Screening WO             Follow Up     Return in about 6 months (around 8/7/2025) for Medicare Wellness.    Patient was given instructions and counseling regarding her condition or for health maintenance advice. Please see specific information pulled into the AVS if appropriate.

## 2025-03-13 ENCOUNTER — OFFICE VISIT (OUTPATIENT)
Dept: CARDIOLOGY | Facility: CLINIC | Age: 75
End: 2025-03-13
Payer: MEDICARE

## 2025-03-13 VITALS
SYSTOLIC BLOOD PRESSURE: 130 MMHG | WEIGHT: 163 LBS | OXYGEN SATURATION: 96 % | DIASTOLIC BLOOD PRESSURE: 80 MMHG | HEIGHT: 62 IN | HEART RATE: 61 BPM | BODY MASS INDEX: 30 KG/M2

## 2025-03-13 DIAGNOSIS — E78.2 MIXED HYPERLIPIDEMIA: Primary | ICD-10-CM

## 2025-03-13 PROCEDURE — 1160F RVW MEDS BY RX/DR IN RCRD: CPT | Performed by: NURSE PRACTITIONER

## 2025-03-13 PROCEDURE — 93000 ELECTROCARDIOGRAM COMPLETE: CPT | Performed by: NURSE PRACTITIONER

## 2025-03-13 PROCEDURE — 99214 OFFICE O/P EST MOD 30 MIN: CPT | Performed by: NURSE PRACTITIONER

## 2025-03-13 PROCEDURE — 1159F MED LIST DOCD IN RCRD: CPT | Performed by: NURSE PRACTITIONER

## 2025-03-13 NOTE — PROGRESS NOTES
Date of Office Visit: 2025  Encounter Provider: CARMELA Dahl  Place of Service: [facility] CARDIOLOGY  Patient Name: Ashley Comer  :1950    Chief complaint: Dyslipidemia    HPI: Ashley Comer is a 74 y.o. female who is a patient of Dr. Vazquez and is new to me today.  She has history of GERD and hyperlipidemia.  She was seen in 2023 for evaluation of palpitations and chest pain.  She had a Holter done at that time showed paroxysmal SVT as well as PACs and PVCs.  Echocardiogram showed a normal EF, grade 1 diastolic dysfunction, moderate mitral annular calcification with mild to moderate mitral valve regurgitation.  Given her cardiovascular risk we wanted to initiate statin but she elected to defer and stay on Zetia.  Her LDL in  was 154.  Last year in September when she came to the office it was in the 120s.  She had a coronary calcium score in  for her calcium score was in the 20s.  She is finally agreed to take a statin in September of last year was started on atorvastatin.  Her LDL is now 75.    She comes in today she walks at the gym on  and uses the elliptical machine.  She wants to start doing some weight lifting.  She denies any chest pain, pressure or tightness.    Previous testing and notes have been reviewed by me.    Latest Reference Range & Units Most Recent   Sodium 136 - 145 mmol/L 139  24 09:45   Potassium 3.5 - 5.2 mmol/L 4.6  24 09:45   Chloride 98 - 107 mmol/L 100  24 09:45   CO2 22.0 - 29.0 mmol/L 28.2  24 09:45   CO2 22 - 29 mmol/L 24  16 06:44   Anion Gap 5.0 - 15.0 mmol/L 11.0  1/3/24 12:21   BUN 8 - 23 mg/dL 13  24 09:45   Creatinine 0.57 - 1.00 mg/dL 0.77  24 09:45   BUN/Creatinine Ratio 7.0 - 25.0  16.9  24 09:45   eGFR >60.0 mL/min/1.73 87.9  1/3/24 12:21   EGFR Result >60.0 mL/min/1.73 81.1  24 09:45   Est GFR by Clearance ml/min/1.732 129  16 06:44   Glucose 65 - 99 mg/dL  85  8/7/24 09:45   Calcium 8.6 - 10.5 mg/dL 10.1  8/7/24 09:45   Magnesium 1.6 - 2.4 mg/dL 1.7  2/26/16 05:56   Alkaline Phosphatase 39 - 117 U/L 59  8/7/24 09:45   Total Protein 6.0 - 8.5 g/dL 6.6  8/7/24 09:45   Albumin 3.5 - 5.2 g/dL 4.6  8/7/24 09:45   Globulin gm/dL 2.6  11/6/23 09:00   A/G Ratio g/dL 2.3  8/7/24 09:45   AST (SGOT) 1 - 32 U/L 24  8/7/24 09:45   ALT (SGPT) 1 - 33 U/L 23  8/7/24 09:45   Total Bilirubin 0.0 - 1.2 mg/dL 0.5  8/7/24 09:45   eGFR Non African Am >60 mL/min/1.73 82  8/12/21 10:44   eGFR African Am >60 mL/min/1.73 119  6/14/21 11:14        Latest Reference Range & Units Most Recent   Total Cholesterol 0 - 200 mg/dL 188  12/19/24 10:44   HDL Cholesterol 40 - 60 mg/dL 101 (H)  12/19/24 10:44   LDL Cholesterol  0 - 100 mg/dL 75  12/19/24 10:44   VLDL Cholesterol 5 - 40 mg/dL 12  12/19/24 10:44   Triglycerides 0 - 150 mg/dL 64  12/19/24 10:44   LDL/HDL Ratio  0.73  12/19/24 10:44   (H): Data is abnormally high    Echocardiogram June 8, 2023      Left ventricular systolic function is normal. Calculated left ventricular EF = 64.4% Normal left ventricular cavity size noted. Left ventricular wall thickness is consistent with moderate concentric hypertrophy. All left ventricular wall segments contract normally. Left ventricular diastolic function is consistent with (grade I) impaired relaxation.    Moderate mitral annular calcification is present. Mild to moderate mitral valve regurgitation is present with multiple jets noted. No significant mitral valve stenosis is present.    Mild tricuspid valve regurgitation is present. Estimated right ventricular systolic pressure from tricuspid regurgitation is normal (<35 mmHg). Calculated right ventricular systolic pressure from tricuspid regurgitation is 33.2 mmHg.     Narrative & Impression   CT CARDIAC CALCIUM SCORE WO DYE-     INDICATION: Former smoker. Hyperlipidemia.     COMPARISON: None     TECHNIQUE:  Coronary calcium CT. Radiation dose reduction  techniques were utilized,  including automated exposure control and exposure modulation based on  body size.     FINDINGS:      Left Main coronary artery: 23  Left anterior descending coronary artery: 1  Circumflex coronary artery: 0  Right coronary artery: 0  Posterior descending coronary artery: 0  Total coronary calcium: 24  Coronary calcium percentile: Between the 25-50th percentile for females  between the ages of 70 and 74 indicating that 25% of people this age and  gender had less calcium than was detected in this study.     Other: Suggestion of a nodule in the inferior right thyroid lobe,  incompletely imaged. Calcified left hilar lymph nodes, consistent with  prior granulomatous infection. Emphysematous changes. Calcified  pulmonary nodule in the left upper lobe, consistent with prior  granulomatous infection.     IMPRESSION:  Coronary calcium score: 24. Definite, at least mild atherosclerotic  plaque burden. Mild or minimal coronary stenoses likely. Moderate  cardiovascular disease risk.  about risk factor modification with  strict adherence to National cholesterol education program primary  prevention cholesterol guidelines.     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    Cataract     removed 2014    Colon polyp 02/22/2016    As report to me by Dr Rodriguez    Degenerative disc disease, cervical     Disease of thyroid gland     THYROID NODULES    Edema     Esophageal reflux     GERD (gastroesophageal reflux disease)     Headache     History of GI bleed     History of transfusion 02/2016    6 UNITS    HL (hearing loss) 09/20/23    Hyperlipidemia     Kidney stone 2003    Knee swelling 2006    Left knee pain     Migraine     Neck pain     Osteopenia     Osteoporosis     Other chest pain     RESOLVED    Scoliosis     Small bowel tumor     Low grade GIST with 0 mitotic figures per  high-powered field, 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    Urinary tract infection 1970       Past Surgical History:   Procedure Laterality Date    APPENDECTOMY  02/23/2016    CATARACT EXTRACTION Bilateral 2014    COLONOSCOPY N/A 07/28/2017    Procedure: COLONOSCOPY INTO CECUM WITH POLYPECTOMY;  Surgeon: Mini Meraz MD;  Location: [facility] ENDOSCOPY;  Service:     COLONOSCOPY N/A 07/30/2021    Procedure: COLONOSCOPY;  Surgeon: Mini Meraz MD;  Location: [facility] MAIN OR;  Service: Gastroenterology;  Laterality: N/A;  normal    ENDOSCOPY N/A 07/28/2017    Procedure: ESOPHAGOGASTRODUODENOSCOPY WITH BIOPSIES;  Surgeon: Mini Meraz MD;  Location: [facility] ENDOSCOPY;  Service:     ENDOSCOPY N/A 07/30/2021    Procedure: ESOPHAGOGASTRODUODENOSCOPY;  Surgeon: Mini Meraz MD;  Location: [facility] MAIN OR;  Service: Gastroenterology;  Laterality: N/A;  gastritis, esophagitis    ENDOSCOPY N/A 6/19/2023    Procedure: ESOPHAGOGASTRODUODENOSCOPY WITH BIOPSY;  Surgeon: Mookie Hawkins MD;  Location: [facility] MAIN OR;  Service: Gastroenterology;  Laterality: N/A;  gastric polyps    FOOT SURGERY Right 1954    DEBRIDMENT    JOINT REPLACEMENT  9/30/2022    LAPAROSCOPIC APPENDECTOMY      SMALL INTESTINE SURGERY  02/2016    for GIST     THYROID SURGERY Left 1993    thyroid lobectomy     TOTAL KNEE ARTHROPLASTY Right 09/30/2022    Procedure: TOTAL KNEE ARTHROPLASTY;  Surgeon: Daniele Mcelroy MD;  Location: [facility] OR [facility] [place];  Service: Orthopedics;  Laterality: Right;    TOTAL KNEE ARTHROPLASTY Left 1/19/2024    Procedure: TOTAL KNEE ARTHROPLASTY;  Surgeon: Daniele Mcelroy MD;  Location: [facility] OR [facility] [place];  Service: Orthopedics;  Laterality: Left;    VITRECTOMY Right 11/2015       Social History     Socioeconomic History    Marital status:      Spouse name: Jimmy    Number of children: 2    Years of education: COLLEGE   Tobacco Use    Smoking status: Former     Current packs/day: 1.00     Average packs/day: 1  pack/day for 57.1 years (57.1 ttl pk-yrs)     Types: Cigarettes     Start date: 2/2/1968     Passive exposure: Past    Smokeless tobacco: Never   Vaping Use    Vaping status: Never Used   Substance and Sexual Activity    Alcohol use: Yes     Alcohol/week: 4.0 standard drinks of alcohol     Types: 4 Glasses of wine per week     Comment: occas    Drug use: No    Sexual activity: Not Currently     Partners: Male       Family History   Problem Relation Age of Onset    Dementia Mother     Hypertension Mother     Tuberculosis Mother     Pneumonia Mother     Broken bones Mother         Wrist, hip    Osteoporosis Mother     Hyperlipidemia Mother     Prostate cancer Father     Tuberculosis Father     Cancer Father         prostate w/bone mets    Ulcerative colitis Sister     Cancer Sister         Throat    Breast cancer Sister     Cancer Sister         Malig Teratoma    Cancer Sister         Breast    Cancer Sister         Breast    Cancer Brother         Prostate    Breast cancer Maternal Aunt     Cancer Maternal Aunt         Breast    Lung cancer Paternal Aunt     Brain cancer Paternal Aunt     Breast cancer Paternal Aunt     Cancer Paternal Aunt         Breast    Cancer Paternal Aunt         Lung    Cancer Paternal Aunt         Lung    Colon polyps Maternal Grandmother     Malig Hyperthermia Neg Hx     Colon cancer Neg Hx     Crohn's disease Neg Hx     Irritable bowel syndrome Neg Hx        Review of Systems   Constitutional: Negative for diaphoresis and malaise/fatigue.   Cardiovascular:  Negative for chest pain, claudication, dyspnea on exertion, irregular heartbeat, leg swelling, near-syncope, orthopnea, palpitations, paroxysmal nocturnal dyspnea and syncope.   Respiratory:  Negative for cough, shortness of breath and sleep disturbances due to breathing.    Musculoskeletal:  Negative for falls.   Neurological:  Negative for dizziness and weakness.   Psychiatric/Behavioral:  Negative for altered mental status and  "substance abuse.        Allergies   Allergen Reactions    Penicillins Rash     Rash over whole body         Current Outpatient Medications:     acetaminophen (TYLENOL) 500 MG tablet, Take 2 tablets by mouth Every 6 (Six) Hours As Needed for Mild Pain. Indications: Pain, Disp: , Rfl:     atorvastatin (LIPITOR) 20 MG tablet, Take 1 tablet by mouth Daily., Disp: 90 tablet, Rfl: 3    cetirizine (zyrTEC) 10 MG tablet, Take 1 tablet by mouth Daily As Needed. Indications: Hayfever, Disp: , Rfl:     cholecalciferol (VITAMIN D3) 25 MCG (1000 UT) tablet, , Disp: , Rfl:     clindamycin (CLEOCIN) 300 MG capsule, TAKE 2 CAPSULES BY MOUTH 1 HOUR BEFORE DENTAL PROCEDURE, Disp: 2 capsule, Rfl: 5    fluticasone (FLONASE) 50 MCG/ACT nasal spray, Administer 2 sprays into the nostril(s) as directed by provider Daily As Needed for Allergies. Indications: Allergic Rhinitis, Disp: , Rfl:     hydroCHLOROthiazide 12.5 MG tablet, TAKE 1 TABLET BY MOUTH DAILY FOR HIGH BLOOD PRESSURE, Disp: 90 tablet, Rfl: 1    ibandronate (BONIVA) 150 MG tablet, Take 1 tablet by mouth Every 30 (Thirty) Days. DUE ON SEPT 24/2022  Indications: Postmenopausal Osteoporosis, Disp: , Rfl:     pantoprazole (PROTONIX) 20 MG EC tablet, TAKE 1 TABLET BY MOUTH DAILY, Disp: 90 tablet, Rfl: 3    Pediatric Multivitamins-Iron (Flintstones Complete) 18 MG chewable tablet chewable tablet, , Disp: , Rfl:     VITAMIN B COMPLEX-C PO, Take 1 tablet by mouth Daily. HOLD PER MD INSTR, Disp: , Rfl:         Objective:     Vitals:    03/13/25 0935   BP: 130/80   BP Location: Left arm   Patient Position: Sitting   Pulse: 61   SpO2: 96%   Weight: 73.9 kg (163 lb)   Height: 157.7 cm (62.07\")     Body mass index is 29.75 kg/m².    PHYSICAL EXAM:    Constitutional:       General: Not in acute distress.     Appearance: Normal appearance. Well-developed.   Eyes:      Pupils: Pupils are equal, round, and reactive to light.   HENT:      Head: Normocephalic.   Neck:      Vascular: No carotid " bruit or JVD.   Pulmonary:      Effort: Pulmonary effort is normal. No tachypnea.      Breath sounds: Normal breath sounds. No wheezing. No rales.   Cardiovascular:      Normal rate. Regular rhythm.      No gallop.    Pulses:     Intact distal pulses.   Edema:     Peripheral edema absent.   Abdominal:      General: Bowel sounds are normal.      Palpations: Abdomen is soft.      Tenderness: There is no abdominal tenderness.   Musculoskeletal: Normal range of motion.      Cervical back: Normal range of motion and neck supple. No edema. Skin:     General: Skin is warm and dry.   Neurological:      Mental Status: Alert and oriented to person, place, and time.           ECG 12 Lead    Date/Time: 3/13/2025 10:14 AM  Performed by: Shani Sepulveda APRN    Authorized by: Shani Sepulveda APRN  Comparison: compared with previous ECG from 10/27/2023  Similar to previous ECG  Rhythm: sinus rhythm  Rate: normal  QRS axis: normal    Clinical impression: normal ECG        Lipid Panel          8/7/2024    09:45 12/19/2024    10:44   Lipid Panel   Total Cholesterol  188    Total Cholesterol 228     Triglycerides 77  64    HDL Cholesterol 90  101    VLDL Cholesterol 13  12    LDL Cholesterol  125  75    LDL/HDL Ratio  0.73          Assessment/Plan:      1.  Dyslipidemia-continue atorvastatin 20 mg daily, LDL at goal.  Patient would like to reduce dose to 10 mg instead of 20.  Will recheck lipid panel and if cholesterol getting low will reduce dose.  Encouraged diet and exercise    2.  Essential hypertension continue hydrochlorothiazide 12.5 mg daily    Follow-up in 6 months.  I will call with lab results.         Your medication list            Accurate as of March 13, 2025 10:06 AM. If you have any questions, ask your nurse or doctor.                CONTINUE taking these medications        Instructions Last Dose Given Next Dose Due   acetaminophen 500 MG tablet  Commonly known as: TYLENOL      Take 2 tablets by mouth Every 6  (Six) Hours As Needed for Mild Pain. Indications: Pain       atorvastatin 20 MG tablet  Commonly known as: LIPITOR      Take 1 tablet by mouth Daily.       cetirizine 10 MG tablet  Commonly known as: zyrTEC      Take 1 tablet by mouth Daily As Needed. Indications: Hayfever       cholecalciferol 25 MCG (1000 UT) tablet  Commonly known as: VITAMIN D3           clindamycin 300 MG capsule  Commonly known as: CLEOCIN      TAKE 2 CAPSULES BY MOUTH 1 HOUR BEFORE DENTAL PROCEDURE       Flintstones Complete 18 MG chewable tablet chewable tablet           fluticasone 50 MCG/ACT nasal spray  Commonly known as: FLONASE      Administer 2 sprays into the nostril(s) as directed by provider Daily As Needed for Allergies. Indications: Allergic Rhinitis       hydroCHLOROthiazide 12.5 MG tablet      TAKE 1 TABLET BY MOUTH DAILY FOR HIGH BLOOD PRESSURE       ibandronate 150 MG tablet  Commonly known as: BONIVA      Take 1 tablet by mouth Every 30 (Thirty) Days. DUE ON SEPT 24/2022  Indications: Postmenopausal Osteoporosis       pantoprazole 20 MG EC tablet  Commonly known as: PROTONIX      TAKE 1 TABLET BY MOUTH DAILY       VITAMIN B COMPLEX-C PO      Take 1 tablet by mouth Daily. HOLD PER MD INSTR                  As always, it has been a pleasure to participate in your patient's care.      Sincerely,     Shani CASEY

## 2025-03-19 ENCOUNTER — LAB (OUTPATIENT)
Facility: HOSPITAL | Age: 75
End: 2025-03-19
Payer: MEDICARE

## 2025-03-19 DIAGNOSIS — E78.2 MIXED HYPERLIPIDEMIA: ICD-10-CM

## 2025-03-19 LAB
ALBUMIN SERPL-MCNC: 4.3 G/DL (ref 3.5–5.2)
ALBUMIN/GLOB SERPL: 1.5 G/DL
ALP SERPL-CCNC: 63 U/L (ref 39–117)
ALT SERPL W P-5'-P-CCNC: 25 U/L (ref 1–33)
ANION GAP SERPL CALCULATED.3IONS-SCNC: 11.2 MMOL/L (ref 5–15)
AST SERPL-CCNC: 30 U/L (ref 1–32)
BILIRUB SERPL-MCNC: 0.3 MG/DL (ref 0–1.2)
BUN SERPL-MCNC: 17 MG/DL (ref 8–23)
BUN/CREAT SERPL: 23.3 (ref 7–25)
CALCIUM SPEC-SCNC: 9.6 MG/DL (ref 8.6–10.5)
CHLORIDE SERPL-SCNC: 104 MMOL/L (ref 98–107)
CHOLEST SERPL-MCNC: 179 MG/DL (ref 0–200)
CO2 SERPL-SCNC: 26.8 MMOL/L (ref 22–29)
CREAT SERPL-MCNC: 0.73 MG/DL (ref 0.57–1)
EGFRCR SERPLBLD CKD-EPI 2021: 85.9 ML/MIN/1.73
GLOBULIN UR ELPH-MCNC: 2.8 GM/DL
GLUCOSE SERPL-MCNC: 90 MG/DL (ref 65–99)
HDLC SERPL-MCNC: 72 MG/DL (ref 40–60)
LDLC SERPL CALC-MCNC: 95 MG/DL (ref 0–100)
LDLC/HDLC SERPL: 1.31 {RATIO}
POTASSIUM SERPL-SCNC: 4.4 MMOL/L (ref 3.5–5.2)
PROT SERPL-MCNC: 7.1 G/DL (ref 6–8.5)
SODIUM SERPL-SCNC: 142 MMOL/L (ref 136–145)
TRIGL SERPL-MCNC: 64 MG/DL (ref 0–150)
VLDLC SERPL-MCNC: 12 MG/DL (ref 5–40)

## 2025-03-19 PROCEDURE — 80053 COMPREHEN METABOLIC PANEL: CPT

## 2025-03-19 PROCEDURE — 36415 COLL VENOUS BLD VENIPUNCTURE: CPT

## 2025-03-19 PROCEDURE — 80061 LIPID PANEL: CPT

## 2025-05-08 ENCOUNTER — HOSPITAL ENCOUNTER (OUTPATIENT)
Facility: HOSPITAL | Age: 75
Discharge: HOME | End: 2025-05-08
Payer: MEDICARE

## 2025-05-08 PROCEDURE — 71271 CT THORAX LUNG CANCER SCR C-: CPT

## 2025-05-30 ENCOUNTER — TELEPHONE (OUTPATIENT)
Dept: CARDIOLOGY | Age: 75
End: 2025-05-30

## 2025-05-30 NOTE — TELEPHONE ENCOUNTER
Caller: Ashley Comer    Relationship to patient: Self    Best call back number: [phone]    Patient is needing: PT REQUESTING AN APPT PER PCP SUGGESTION. PT HAS SMALL AMOUNT OF PERICARDIAL FLUID AND NEEDS TO BE SEEN IN OFFICE. PT NOT HAVING SYMPTOMS. PLEASE CALL TO SCHEDULE

## 2025-06-10 ENCOUNTER — OFFICE VISIT (OUTPATIENT)
Age: 75
End: 2025-06-10
Payer: MEDICARE

## 2025-06-10 VITALS
DIASTOLIC BLOOD PRESSURE: 80 MMHG | HEIGHT: 62 IN | BODY MASS INDEX: 29.81 KG/M2 | WEIGHT: 162 LBS | SYSTOLIC BLOOD PRESSURE: 132 MMHG | HEART RATE: 60 BPM | OXYGEN SATURATION: 97 %

## 2025-06-10 DIAGNOSIS — I31.39 PERICARDIAL EFFUSION: ICD-10-CM

## 2025-06-10 DIAGNOSIS — E78.2 MIXED HYPERLIPIDEMIA: Primary | ICD-10-CM

## 2025-06-10 PROCEDURE — 99214 OFFICE O/P EST MOD 30 MIN: CPT | Performed by: STUDENT IN AN ORGANIZED HEALTH CARE EDUCATION/TRAINING PROGRAM

## 2025-06-10 PROCEDURE — 1159F MED LIST DOCD IN RCRD: CPT | Performed by: STUDENT IN AN ORGANIZED HEALTH CARE EDUCATION/TRAINING PROGRAM

## 2025-06-10 PROCEDURE — 1160F RVW MEDS BY RX/DR IN RCRD: CPT | Performed by: STUDENT IN AN ORGANIZED HEALTH CARE EDUCATION/TRAINING PROGRAM

## 2025-06-10 RX ORDER — FERROUS SULFATE 325(65) MG
TABLET ORAL
COMMUNITY

## 2025-06-10 NOTE — PROGRESS NOTES
Subjective:     Encounter Date:06/10/2025      Patient ID: Ashley Comer is a 74 y.o. female.    Chief Complaint:  Hyperlipidemia    HPI:   74 y.o. female with GERD and hyperlipidemia who presents for follow-up of her hyperlipidemia and recently found small pericardial effusion.  Patient was last seen by Shani in March and lipid panel revealed increase in LDL to the 90s from the 70s so she was continued on her atorvastatin 20 mg daily.  More recently she had a low-dose CT for lung cancer screening and this revealed a small amount of pericardial fluid.  She grew concerned so presented for follow-up.  She has been asymptomatic from a cardiac standpoint and has not had any change in symptoms.    Per prior note:  Patient was last seen in October 2023 for evaluation of palpitations and chest discomfort.  Her chest discomfort was believed to be secondary to GERD.  For palpitation she had a Holter monitor placed which revealed normal sinus rhythm with episodes of paroxysmal SVT as well as PACs and PVCs.  She had an echocardiogram performed in June 2023 and this revealed normal EF, grade 1 diastolic dysfunction, moderate MAC and mild to moderate mitral regurgitation.  Given her ASCVD risk we discussed initiation of statin but patient elected to defer and instead continue with Zetia.  Most recent lipid panel revealed LDL in the 120s.  She had a CT calcium score earlier this year that revealed a calcium score in the 20s.    The following portions of the patient's history were reviewed and updated as appropriate: allergies, current medications, past family history, past medical history, past social history, past surgical history and problem list.     REVIEW OF SYSTEMS:   All systems reviewed.  Pertinent positives identified in HPI.  All other systems are negative.    Past Medical History:   Diagnosis Date    Allergic 1970    pcn    Anemia     Arthritis 2006    Arthritis of neck 2017    Bradycardia     Cancer 2016    GIST  sm bowel    Cataract     removed 2014    Colon polyp 02/22/2016    As report to me by Dr Rodriguez    Degenerative disc disease, cervical     Disease of thyroid gland     THYROID NODULES    Edema     Esophageal reflux     GERD (gastroesophageal reflux disease)     Headache     History of GI bleed     History of transfusion 02/2016    6 UNITS    HL (hearing loss) 09/20/23    Hyperlipidemia     Kidney stone 2003    Knee swelling 2006    Left knee pain     Migraine     Neck pain     Osteopenia     Osteoporosis     Other chest pain     RESOLVED    Scoliosis     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    Urinary tract infection 1970       Family History   Problem Relation Age of Onset    Dementia Mother     Hypertension Mother     Tuberculosis Mother     Pneumonia Mother     Broken bones Mother         Wrist, hip    Osteoporosis Mother     Hyperlipidemia Mother     Prostate cancer Father     Tuberculosis Father     Cancer Father         prostate w/bone mets    Ulcerative colitis Sister     Cancer Sister         Throat    Breast cancer Sister     Cancer Sister         Malig Teratoma    Cancer Sister         Breast    Cancer Sister         Breast    Cancer Brother         Prostate    Breast cancer Maternal Aunt     Cancer Maternal Aunt         Breast    Lung cancer Paternal Aunt     Brain cancer Paternal Aunt     Breast cancer Paternal Aunt     Cancer Paternal Aunt         Breast    Cancer Paternal Aunt         Lung    Cancer Paternal Aunt         Lung    Colon polyps Maternal Grandmother     Malig Hyperthermia Neg Hx     Colon cancer Neg Hx     Crohn's disease Neg Hx     Irritable bowel syndrome Neg Hx        Social History     Socioeconomic History    Marital status:      Spouse name: Jimmy    Number of children: 2    Years of education: COLLEGE   Tobacco Use    Smoking status: Former     Current packs/day: 1.00     Average  packs/day: 1 pack/day for 57.4 years (57.4 ttl pk-yrs)     Types: Cigarettes     Start date: 2/2/1968     Passive exposure: Past    Smokeless tobacco: Never   Vaping Use    Vaping status: Never Used   Substance and Sexual Activity    Alcohol use: Yes     Alcohol/week: 4.0 standard drinks of alcohol     Types: 4 Glasses of wine per week     Comment: occas    Drug use: No    Sexual activity: Not Currently     Partners: Male       Allergies   Allergen Reactions    Penicillins Rash     Rash over whole body       Past Surgical History:   Procedure Laterality Date    APPENDECTOMY  02/23/2016    CATARACT EXTRACTION Bilateral 2014    COLONOSCOPY N/A 07/28/2017    Procedure: COLONOSCOPY INTO CECUM WITH POLYPECTOMY;  Surgeon: Mini Meraz MD;  Location: [facility] ENDOSCOPY;  Service:     COLONOSCOPY N/A 07/30/2021    Procedure: COLONOSCOPY;  Surgeon: Mini Meraz MD;  Location: [facility] MAIN OR;  Service: Gastroenterology;  Laterality: N/A;  normal    ENDOSCOPY N/A 07/28/2017    Procedure: ESOPHAGOGASTRODUODENOSCOPY WITH BIOPSIES;  Surgeon: Mini Meraz MD;  Location: [facility] ENDOSCOPY;  Service:     ENDOSCOPY N/A 07/30/2021    Procedure: ESOPHAGOGASTRODUODENOSCOPY;  Surgeon: Mini Meraz MD;  Location: [facility] MAIN OR;  Service: Gastroenterology;  Laterality: N/A;  gastritis, esophagitis    ENDOSCOPY N/A 6/19/2023    Procedure: ESOPHAGOGASTRODUODENOSCOPY WITH BIOPSY;  Surgeon: Mookie Hawkins MD;  Location: [facility] MAIN OR;  Service: Gastroenterology;  Laterality: N/A;  gastric polyps    FOOT SURGERY Right 1954    DEBRIDMENT    JOINT REPLACEMENT  9/30/2022    LAPAROSCOPIC APPENDECTOMY      SMALL INTESTINE SURGERY  02/2016    for GIST     THYROID SURGERY Left 1993    thyroid lobectomy     TOTAL KNEE ARTHROPLASTY Right 09/30/2022    Procedure: TOTAL KNEE ARTHROPLASTY;  Surgeon: Daniele Mcelroy MD;  Location: [facility] OR [facility];  Service: Orthopedics;  Laterality: Right;    TOTAL KNEE ARTHROPLASTY Left 1/19/2024     Procedure: TOTAL KNEE ARTHROPLASTY;  Surgeon: Daniele Mcelroy MD;  Location: [facility] OR [facility];  Service: Orthopedics;  Laterality: Left;    VITRECTOMY Right 11/2015       Procedures       Objective:         Vitals:    06/10/25 1335   BP: 132/80   Pulse: 60   SpO2: 97%       PHYSICAL EXAM:  GEN: well appearing, in NAD   HEENT: NCAT, EOMI, moist mucus membranes   Respiratory: CTAB, no wheezes, rales or rhonchi  CV: normal rate, regular rhythm, normal S1, S2, 2/6 systolic murmur in RUSB, rubs, gallops, +2 radial pulses b/l  GI: soft, nontender, nondistended  MSK: no edema  Skin: no rash, warm, dry  Heme/Lymph: no bruising or bleeding  Neuro: Alert and Oriented x 3, grossly normal motor function        Assessment:         (E78.2) Mixed hyperlipidemia    (I31.39) Pericardial effusion    74 y.o. female with GERD and hyperlipidemia who presents for follow-up of her hyperlipidemia and recently found small pericardial effusion.        Plan:       #Hyperlipidemia  Patient's ASCVD risk is 17%.  Most recent LDL at 90 he is on atorvastatin 20 mg daily.  Will recheck in a couple of months and if still elevated may need to increase to 40 mg daily.  - lipid panel in 3 months    #Small pericardial effusion  Incidental on low-dose CT.  Asymptomatic, no further cardiac testing at this time.    Manjula garnica, thank you very much for referring this kind patient to me. Please call me with any questions or concerns. I will see the patient again in the office in 6 months or earlier as needed.         Henry Taylor MD  06/10/25  [facility]    Outpatient Encounter Medications as of 6/10/2025   Medication Sig Dispense Refill    acetaminophen (TYLENOL) 500 MG tablet Take 2 tablets by mouth Every 6 (Six) Hours As Needed for Mild Pain. Indications: Pain      atorvastatin (LIPITOR) 20 MG tablet Take 1 tablet by mouth Daily. 90 tablet 3    cetirizine (zyrTEC) 10 MG tablet Take 1 tablet by mouth Daily As Needed. Indications: Hayfever       cholecalciferol (VITAMIN D3) 25 MCG (1000 UT) tablet       clindamycin (CLEOCIN) 300 MG capsule TAKE 2 CAPSULES BY MOUTH 1 HOUR BEFORE DENTAL PROCEDURE 2 capsule 5    ferrous sulfate 325 (65 FE) MG tablet       fluticasone (FLONASE) 50 MCG/ACT nasal spray Administer 2 sprays into the nostril(s) as directed by provider Daily As Needed for Allergies. Indications: Allergic Rhinitis      hydroCHLOROthiazide 12.5 MG tablet TAKE 1 TABLET BY MOUTH DAILY FOR HIGH BLOOD PRESSURE 90 tablet 1    ibandronate (BONIVA) 150 MG tablet Take 1 tablet by mouth Every 30 (Thirty) Days. DUE ON SEPT 24/2022  Indications: Postmenopausal Osteoporosis      multivitamin with minerals (HAIR SKIN AND NAILS FORMULA PO)       pantoprazole (PROTONIX) 20 MG EC tablet TAKE 1 TABLET BY MOUTH DAILY 90 tablet 3    VITAMIN B COMPLEX-C PO Take 1 tablet by mouth Daily. HOLD PER MD WINKLER      [DISCONTINUED] Pediatric Multivitamins-Iron (Flintstones Complete) 18 MG chewable tablet chewable tablet        No facility-administered encounter medications on file as of 6/10/2025.

## 2025-06-13 ENCOUNTER — OFFICE VISIT (OUTPATIENT)
Dept: GASTROENTEROLOGY | Facility: CLINIC | Age: 75
End: 2025-06-13
Payer: MEDICARE

## 2025-06-13 VITALS
TEMPERATURE: 98.2 F | OXYGEN SATURATION: 98 % | HEIGHT: 62 IN | WEIGHT: 161.4 LBS | DIASTOLIC BLOOD PRESSURE: 110 MMHG | SYSTOLIC BLOOD PRESSURE: 148 MMHG | HEART RATE: 67 BPM | BODY MASS INDEX: 29.7 KG/M2

## 2025-06-13 DIAGNOSIS — K21.9 GASTROESOPHAGEAL REFLUX DISEASE WITHOUT ESOPHAGITIS: ICD-10-CM

## 2025-06-13 DIAGNOSIS — Z86.0100 HISTORY OF COLON POLYPS: ICD-10-CM

## 2025-06-13 DIAGNOSIS — K22.4 ESOPHAGEAL SPASM: Primary | ICD-10-CM

## 2025-06-13 DIAGNOSIS — R10.30 ABDOMINAL PAIN, LOWER: ICD-10-CM

## 2025-06-13 DIAGNOSIS — R07.89 ATYPICAL CHEST PAIN: ICD-10-CM

## 2025-06-13 DIAGNOSIS — Z12.11 SCREENING FOR MALIGNANT NEOPLASM OF COLON: ICD-10-CM

## 2025-06-13 DIAGNOSIS — Z90.49 HISTORY OF RESECTION OF SMALL BOWEL: ICD-10-CM

## 2025-06-13 DIAGNOSIS — Z85.09 HISTORY OF GASTROINTESTINAL STROMAL TUMOR (GIST): ICD-10-CM

## 2025-06-13 DIAGNOSIS — R10.32 LEFT LOWER QUADRANT ABDOMINAL PAIN: ICD-10-CM

## 2025-06-13 RX ORDER — PANTOPRAZOLE SODIUM 20 MG/1
20 TABLET, DELAYED RELEASE ORAL DAILY
Qty: 90 TABLET | Refills: 3 | Status: SHIPPED | OUTPATIENT
Start: 2025-06-13

## 2025-06-13 NOTE — PATIENT INSTRUCTIONS
Scheduling of your Ordered Diagnostic Tests : CT abdomen and pelvis    A team member from [facility] centralized scheduling department will contact you to schedule your tests.    You can also contact them directly at [phone].

## 2025-06-13 NOTE — PROGRESS NOTES
No chief complaint on file.            GASTROENTEROLOGY SUMMARY  73-year-old female presents today for follow-up.    Last office visit June 24, 2024.  Last EGD June 19, 2023.  Initial consult May 22, 2023 for acid reflux with increased belching, early satiety and chest pressure.    She has a history of GIST status post laparoscopic appendectomy and partial small bowel resection with reanastomosis February 22, 2016 with Dr. Meraz.   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    Her prognosis was good with surgery alone and adjunctive Gleevec therapy was not recommended.   She has completed 5 years of surveillance with oncology, follow-up CT scan January 2021 for resected GIST and has been released to follow-up with oncology as needed.       She is status post knee replacement January 2024.     Colon cancer screening up-to-date, due for surveillance colonoscopy July 2026, this has been placed in our electronic reminder system.    She had a change in symptoms with left mid abdomen January 2025 several days after a fall on black ice, she landed on her bottom.   Initially pain was described as random sharper pain, now she describes it as pressure, pain can occur several times per day or she can go can go a couple days without pain.  No relation to BM, physical activity, movement or any associated triggers.   No associated weight loss, nausea or vomiting    She also has lower abdominal pain described as mild pain but persistent.  Comes and goes quickly.  No fevers, weight loss, rectal bleeding, change in bowel habits.   No previous GYN surgery.   She is up to date with GYN.    Atypical chest pain, esophageal spasms are controlled with pantoprazole 20 mg daily.    She denies pain or trouble with swallowing, no esophageal dysphagia or forced regurgitation.    Result Review :         REVIEWED PERTINENT RESULTS/ LABS  Lab Results   Component Value Date    CASEREPORT   "06/19/2023     Surgical Pathology Report                         Case: [id number]                                  Authorizing Provider:  Mookie Hawkins MD    Collected:           06/19/2023 02:16 PM          Ordering Location:     [facility]     Received:            06/19/2023 03:36 PM                                 [facility]                                                     Pathologist:           Mark Weinstein MD                                                         Specimen:    Stomach, gastric polyps                                                                    FINALDX  06/19/2023     1. Stomach, Biopsy: Fundic gland polyp with    A. Mild chronic inflammation.    ilene/pkm        Lab Results   Component Value Date    HGB 12.7 12/02/2024    MCV 93.5 12/02/2024     12/02/2024    ALT 25 03/19/2025    AST 30 03/19/2025    HGBA1C 5.1 03/02/2015    INR 1.0 08/30/2017    TRIG 64 03/19/2025    FERRITIN 53.30 12/02/2024    IRON 94 12/02/2024    TIBC 384 12/02/2024       Vital Signs:   BP (!) 148/110   Pulse 67   Temp 98.2 °F (36.8 °C)   Ht 157.5 cm (62\")   Wt 73.2 kg (161 lb 6.4 oz)   SpO2 98%   BMI 29.52 kg/m²     Body mass index is 29.52 kg/m².     Physical Exam  Vitals reviewed.   Constitutional:       General: She is not in acute distress.     Appearance: Normal appearance. She is well-developed. She is not diaphoretic.   HENT:      Head: Normocephalic and atraumatic.   Eyes:      General: No scleral icterus.  Cardiovascular:      Rate and Rhythm: Normal rate and regular rhythm.   Pulmonary:      Effort: Pulmonary effort is normal. No respiratory distress.      Breath sounds: Normal breath sounds.   Abdominal:      General: Bowel sounds are normal. There is no distension.      Palpations: Abdomen is soft.      Tenderness: There is abdominal tenderness (Lower abdominal tenderness noted with palpation). There is no guarding or rebound.   Skin:     General: Skin " is warm and dry.      Coloration: Skin is not jaundiced.   Neurological:      Mental Status: She is alert and oriented to person, place, and time.   Psychiatric:         Mood and Affect: Mood normal.         Behavior: Behavior normal.         Thought Content: Thought content normal.         Judgment: Judgment normal.         Assessment and Plan        Diagnoses and all orders for this visit:    1. Esophageal spasm (Primary)  -     pantoprazole (PROTONIX) 20 MG EC tablet; Take 1 tablet by mouth Daily.  Dispense: 90 tablet; Refill: 3    2. Atypical chest pain  -     pantoprazole (PROTONIX) 20 MG EC tablet; Take 1 tablet by mouth Daily.  Dispense: 90 tablet; Refill: 3    3. History of colon polyps  -     CT Abdomen Pelvis With Contrast    4. Screening for malignant neoplasm of colon    5. Gastroesophageal reflux disease without esophagitis  -     pantoprazole (PROTONIX) 20 MG EC tablet; Take 1 tablet by mouth Daily.  Dispense: 90 tablet; Refill: 3    6. Left lower quadrant abdominal pain  -     CT Abdomen Pelvis With Contrast    7. History of gastrointestinal stromal tumor (GIST)  -     CT Abdomen Pelvis With Contrast    8. History of resection of small bowel  -     CT Abdomen Pelvis With Contrast    9. Abdominal pain, lower  -     CT Abdomen Pelvis With Contrast       Lower abdominal pain persistent, history of partial small bowel resection with appendectomy and reanastomosis secondary to GIST 2/22/2016.  Given surgical history and persistent pain, recommend CT scan for further evaluation, orders placed    Atypical chest pain, acid reflux and esophageal spasms, stable, chronic, continue lowest dose of acid medication possible to control symptoms, refill of pantoprazole 20 mg sent electronically to pharmacy    History of GIST tumor, removed, 5-year surveillance complete 2021, patient has been released from oncology and is to follow-up on an as-needed basis only.     History of colon polyps, screening colonoscopy  up-to-date, surveillance colonoscopy recommended 7/2026, this has been placed in our electronic reminder system    Based on results of CT scan and clinical course, to consider colonoscopy at a sooner date    Further recommendations will be made based on results of CT scan and clinical course            Patient Instructions   Scheduling of your Ordered Diagnostic Tests : CT abdomen and pelvis    A team member from [facility] centralized scheduling department will contact you to schedule your tests.    You can also contact them directly at [phone].         EMR Dragon/Transcription Disclaimer:  This document has been Dictated utilizing Dragon dictation.

## 2025-07-01 ENCOUNTER — HOSPITAL ENCOUNTER (OUTPATIENT)
Dept: CT IMAGING | Facility: HOSPITAL | Age: 75
Discharge: HOME | End: 2025-07-01
Admitting: NURSE PRACTITIONER
Payer: MEDICARE

## 2025-07-01 PROCEDURE — 25510000002 DIATRIZOATE MEGLUMINE & SODIUM PER 1 ML: Performed by: NURSE PRACTITIONER

## 2025-07-01 PROCEDURE — 74177 CT ABD & PELVIS W/CONTRAST: CPT

## 2025-07-01 PROCEDURE — 25510000001 IOPAMIDOL 61 % SOLUTION: Performed by: NURSE PRACTITIONER

## 2025-07-01 RX ORDER — DIATRIZOATE MEGLUMINE AND DIATRIZOATE SODIUM 660; 100 MG/ML; MG/ML
30 SOLUTION ORAL; RECTAL
Status: COMPLETED | OUTPATIENT
Start: 2025-07-01 | End: 2025-07-01

## 2025-07-01 RX ORDER — IOPAMIDOL 612 MG/ML
100 INJECTION, SOLUTION INTRAVASCULAR
Status: COMPLETED | OUTPATIENT
Start: 2025-07-01 | End: 2025-07-01

## 2025-07-01 RX ADMIN — IOPAMIDOL 84 ML: 612 INJECTION, SOLUTION INTRAVENOUS at 14:39

## 2025-07-01 RX ADMIN — DIATRIZOATE MEGLUMINE AND DIATRIZOATE SODIUM 30 ML: 660; 100 LIQUID ORAL; RECTAL at 14:39

## 2025-07-10 ENCOUNTER — PATIENT MESSAGE (OUTPATIENT)
Dept: GASTROENTEROLOGY | Facility: CLINIC | Age: 75
End: 2025-07-10
Payer: MEDICARE

## 2025-07-22 DIAGNOSIS — M25.472 LEFT ANKLE SWELLING: ICD-10-CM

## 2025-07-22 RX ORDER — HYDROCHLOROTHIAZIDE 12.5 MG/1
12.5 TABLET ORAL DAILY
Qty: 90 TABLET | Refills: 1 | Status: SHIPPED | OUTPATIENT
Start: 2025-07-22

## 2025-07-22 NOTE — TELEPHONE ENCOUNTER
Rx Refill Note  Requested Prescriptions     Pending Prescriptions Disp Refills    hydroCHLOROthiazide 12.5 MG tablet [Pharmacy Med Name: hydroCHLOROthiazide 12.5 MG TABLET] 90 tablet 1     Sig: TAKE 1 TABLET BY MOUTH DAILY FOR HIGH BLOOD PRESSURE      Last office visit with prescribing clinician: 2/7/2025   Last telemedicine visit with prescribing clinician: Visit date not found   Next office visit with prescribing clinician: 8/11/2025                         Would you like a call back once the refill request has been completed: [] Yes [] No    If the office needs to give you a call back, can they leave a voicemail: [] Yes [] No    Alexandra Bell MA  07/22/25, 10:38 EDT

## 2025-07-25 ENCOUNTER — TELEPHONE (OUTPATIENT)
Dept: GASTROENTEROLOGY | Facility: CLINIC | Age: 75
End: 2025-07-25
Payer: MEDICARE

## 2025-08-11 ENCOUNTER — OFFICE VISIT (OUTPATIENT)
Dept: FAMILY MEDICINE CLINIC | Facility: CLINIC | Age: 75
End: 2025-08-11
Payer: MEDICARE

## 2025-08-11 VITALS
TEMPERATURE: 98 F | WEIGHT: 162 LBS | HEART RATE: 63 BPM | BODY MASS INDEX: 29.81 KG/M2 | SYSTOLIC BLOOD PRESSURE: 118 MMHG | OXYGEN SATURATION: 95 % | HEIGHT: 62 IN | DIASTOLIC BLOOD PRESSURE: 68 MMHG

## 2025-08-11 DIAGNOSIS — M54.50 CHRONIC LUMBOSACRAL PAIN: ICD-10-CM

## 2025-08-11 DIAGNOSIS — E78.00 PURE HYPERCHOLESTEROLEMIA: ICD-10-CM

## 2025-08-11 DIAGNOSIS — Z00.00 MEDICARE ANNUAL WELLNESS VISIT, SUBSEQUENT: Primary | ICD-10-CM

## 2025-08-11 DIAGNOSIS — G89.29 CHRONIC LUMBOSACRAL PAIN: ICD-10-CM

## 2025-08-11 RX ORDER — CYCLOBENZAPRINE HCL 5 MG
5 TABLET ORAL 3 TIMES DAILY PRN
Qty: 30 TABLET | Refills: 0 | Status: SHIPPED | OUTPATIENT
Start: 2025-08-11 | End: 2025-08-21

## 2025-08-12 ENCOUNTER — TELEPHONE (OUTPATIENT)
Dept: FAMILY MEDICINE CLINIC | Facility: CLINIC | Age: 75
End: 2025-08-12
Payer: MEDICARE

## 2025-08-12 ENCOUNTER — LAB (OUTPATIENT)
Facility: HOSPITAL | Age: 75
End: 2025-08-12
Payer: MEDICARE

## 2025-08-12 LAB
ALBUMIN SERPL-MCNC: 4.1 G/DL (ref 3.5–5.2)
ALBUMIN/GLOB SERPL: 1.5 G/DL
ALP SERPL-CCNC: 69 U/L (ref 39–117)
ALT SERPL W P-5'-P-CCNC: 47 U/L (ref 1–33)
ANION GAP SERPL CALCULATED.3IONS-SCNC: 9.6 MMOL/L (ref 5–15)
AST SERPL-CCNC: 36 U/L (ref 1–32)
BASOPHILS # BLD AUTO: 0.04 10*3/MM3 (ref 0–0.2)
BASOPHILS NFR BLD AUTO: 0.9 % (ref 0–1.5)
BILIRUB SERPL-MCNC: 0.5 MG/DL (ref 0–1.2)
BUN SERPL-MCNC: 19 MG/DL (ref 8–23)
BUN/CREAT SERPL: 25 (ref 7–25)
CALCIUM SPEC-SCNC: 9.8 MG/DL (ref 8.6–10.5)
CHLORIDE SERPL-SCNC: 104 MMOL/L (ref 98–107)
CHOLEST SERPL-MCNC: 183 MG/DL (ref 0–200)
CO2 SERPL-SCNC: 27.4 MMOL/L (ref 22–29)
CREAT SERPL-MCNC: 0.76 MG/DL (ref 0.57–1)
DEPRECATED RDW RBC AUTO: 41.1 FL (ref 37–54)
EGFRCR SERPLBLD CKD-EPI 2021: 81.8 ML/MIN/1.73
EOSINOPHIL # BLD AUTO: 0.17 10*3/MM3 (ref 0–0.4)
EOSINOPHIL NFR BLD AUTO: 3.7 % (ref 0.3–6.2)
ERYTHROCYTE [DISTWIDTH] IN BLOOD BY AUTOMATED COUNT: 12.2 % (ref 12.3–15.4)
GLOBULIN UR ELPH-MCNC: 2.7 GM/DL
GLUCOSE SERPL-MCNC: 93 MG/DL (ref 65–99)
HCT VFR BLD AUTO: 41.2 % (ref 34–46.6)
HDLC SERPL-MCNC: 81 MG/DL (ref 40–60)
HGB BLD-MCNC: 13.3 G/DL (ref 12–15.9)
IMM GRANULOCYTES # BLD AUTO: 0.01 10*3/MM3 (ref 0–0.05)
IMM GRANULOCYTES NFR BLD AUTO: 0.2 % (ref 0–0.5)
LDLC SERPL CALC-MCNC: 88 MG/DL (ref 0–100)
LDLC/HDLC SERPL: 1.07 {RATIO}
LYMPHOCYTES # BLD AUTO: 1.3 10*3/MM3 (ref 0.7–3.1)
LYMPHOCYTES NFR BLD AUTO: 28.1 % (ref 19.6–45.3)
MCH RBC QN AUTO: 29.8 PG (ref 26.6–33)
MCHC RBC AUTO-ENTMCNC: 32.3 G/DL (ref 31.5–35.7)
MCV RBC AUTO: 92.2 FL (ref 79–97)
MONOCYTES # BLD AUTO: 0.43 10*3/MM3 (ref 0.1–0.9)
MONOCYTES NFR BLD AUTO: 9.3 % (ref 5–12)
NEUTROPHILS NFR BLD AUTO: 2.67 10*3/MM3 (ref 1.7–7)
NEUTROPHILS NFR BLD AUTO: 57.8 % (ref 42.7–76)
NRBC BLD AUTO-RTO: 0 /100 WBC (ref 0–0.2)
PLATELET # BLD AUTO: 296 10*3/MM3 (ref 140–450)
PMV BLD AUTO: 9.6 FL (ref 6–12)
POTASSIUM SERPL-SCNC: 4.5 MMOL/L (ref 3.5–5.2)
PROT SERPL-MCNC: 6.8 G/DL (ref 6–8.5)
RBC # BLD AUTO: 4.47 10*6/MM3 (ref 3.77–5.28)
SODIUM SERPL-SCNC: 141 MMOL/L (ref 136–145)
TRIGL SERPL-MCNC: 78 MG/DL (ref 0–150)
TSH SERPL DL<=0.05 MIU/L-ACNC: 1.67 UIU/ML (ref 0.27–4.2)
VLDLC SERPL-MCNC: 14 MG/DL (ref 5–40)
WBC NRBC COR # BLD AUTO: 4.62 10*3/MM3 (ref 3.4–10.8)

## 2025-08-12 PROCEDURE — 84443 ASSAY THYROID STIM HORMONE: CPT

## 2025-08-12 PROCEDURE — 80061 LIPID PANEL: CPT

## 2025-08-12 PROCEDURE — 80053 COMPREHEN METABOLIC PANEL: CPT

## 2025-08-12 PROCEDURE — 36415 COLL VENOUS BLD VENIPUNCTURE: CPT

## 2025-08-12 PROCEDURE — 85025 COMPLETE CBC W/AUTO DIFF WBC: CPT

## 2025-08-31 DIAGNOSIS — R74.8 ELEVATED LIVER ENZYMES: Primary | ICD-10-CM

## (undated) DEVICE — DRESSING, SURESITE SELECT, 2.8'X3.50': Brand: MEDLINE

## (undated) DEVICE — THE STERILE LIGHT HANDLE COVER IS USED WITH STERIS SURGICAL LIGHTING AND VISUALIZATION SYSTEMS.

## (undated) DEVICE — TUBING, SUCTION, 1/4" X 10', STRAIGHT: Brand: MEDLINE

## (undated) DEVICE — KT DRN EVAC WND PVC PCH WTROC RND 10F400

## (undated) DEVICE — MAT FLR ABSORBENT LG 4FT 10 2.5FT

## (undated) DEVICE — PATIENT RETURN ELECTRODE, SINGLE-USE, CONTACT QUALITY MONITORING, ADULT, WITH 9FT CORD, FOR PATIENTS WEIGING OVER 33LBS. (15KG): Brand: MEGADYNE

## (undated) DEVICE — MEDI-VAC YANKAUER SUCTION HANDLE W/BULBOUS TIP: Brand: CARDINAL HEALTH

## (undated) DEVICE — SOL IRR NACL 0.9PCT 3000ML

## (undated) DEVICE — UNDERCAST PADDING: Brand: DEROYAL

## (undated) DEVICE — PK KN TOTL 40

## (undated) DEVICE — CANN NASL CO2 TRULINK W/O2 A/

## (undated) DEVICE — SYS CLS SKIN PREMIERPRO EXOFINFUSION 22CM

## (undated) DEVICE — APPL DURAPREP IODOPHOR APL 26ML

## (undated) DEVICE — 450 ML BOTTLE OF 0.05% CHLORHEXIDINE GLUCONATE IN 99.95% STERILE WATER FOR IRRIGATION, USP AND APPLICATOR.: Brand: IRRISEPT ANTIMICROBIAL WOUND LAVAGE

## (undated) DEVICE — PREMIUM WET SKIN PREP TRAY: Brand: MEDLINE INDUSTRIES, INC.

## (undated) DEVICE — NEEDLE, QUINCKE 22GX3.5": Brand: MEDLINE INDUSTRIES, INC.

## (undated) DEVICE — Device: Brand: DEFENDO AIR/WATER/SUCTION AND BIOPSY VALVE

## (undated) DEVICE — GLV SURG PREMIERPRO ORTHO LTX PF SZ7.5 BRN

## (undated) DEVICE — DUAL CUT SAGITTAL BLADE

## (undated) DEVICE — SUT VIC 1 CT1 36IN J947H

## (undated) DEVICE — MSK ENDO PORT O2 POM ELITE CURAPLEX A/

## (undated) DEVICE — PREP SOL POVIDONE/IODINE BT 4OZ

## (undated) DEVICE — BNDG,ELSTC,MATRIX,STRL,6"X5YD,LF,HOOK&LP: Brand: MEDLINE

## (undated) DEVICE — GLV SURG SENSICARE PI MIC PF SZ7 LF STRL

## (undated) DEVICE — GLV SURG SENSICARE W/ALOE PF LF 7.5 STRL

## (undated) DEVICE — DRSNG BURN ACTICOAT FLEX 7 1X24IN

## (undated) DEVICE — TBG PENCL TELESCP MEGADYNE SMOKE EVAC 10FT

## (undated) DEVICE — GLV SURG SENSICARE PI PF LF 7 GRN STRL

## (undated) DEVICE — KT ORCA ORCAPOD DISP STRL

## (undated) DEVICE — BITEBLOCK OMNI BLOC

## (undated) DEVICE — DRSNG SURESITE WNDW 2.38X2.75

## (undated) DEVICE — GOWN ISOL W/THUMB UNIV BLU BX/15

## (undated) DEVICE — TRAP FLD MINIVAC MEGADYNE 100ML

## (undated) DEVICE — UNDERGLV SURG BIOGEL INDICATOR LF PF 7.5

## (undated) DEVICE — TBG 02 CRUSH RESIST LF CLR 7FT

## (undated) DEVICE — ANTIBACTERIAL UNDYED BRAIDED (POLYGLACTIN 910), SYNTHETIC ABSORBABLE SUTURE: Brand: COATED VICRYL

## (undated) DEVICE — DEV CONTRL TISS STRATAFIX SYMM PDS PLUS VIL CT-1 60CM

## (undated) DEVICE — CMT BONE PALACOS R HI/VISC 1X40

## (undated) DEVICE — VIAL FORMLN CAP 10PCT 20ML

## (undated) DEVICE — Device

## (undated) DEVICE — STERILE PATIENT PROTECTIVE PAD FOR IMP® KNEE POSITIONERS & COHESIVE WRAP (10 / CASE): Brand: DE MAYO KNEE POSITIONER®

## (undated) DEVICE — 3M™ IOBAN™ 2 ANTIMICROBIAL INCISE DRAPE 6640EZ: Brand: IOBAN™ 2

## (undated) DEVICE — FLEX ADVANTAGE 1500CC: Brand: FLEX ADVANTAGE

## (undated) DEVICE — SINGLE-USE BIOPSY FORCEPS: Brand: RADIAL JAW 4

## (undated) DEVICE — GLV SURG BIOGEL LTX PF 7 1/2

## (undated) DEVICE — JACKT LAB F/R KNIT CUFF/COLR XLG BLU

## (undated) DEVICE — DEV CONTRL TISS STRATAFIXSPIRALMNCRYL PLSPS2 REV3/0 45CM

## (undated) DEVICE — GLV SURG SENSICARE W/ALOE PF LF 8 STRL

## (undated) DEVICE — PICO 7 10CM X 30CM: Brand: PICO™ 7

## (undated) DEVICE — BNDG ELAS ELITE V/CLOSE 6IN 5YD LF STRL

## (undated) DEVICE — THE TORRENT IRRIGATION SCOPE CONNECTOR IS USED WITH THE TORRENT IRRIGATION TUBING TO PROVIDE IRRIGATION FLUIDS SUCH AS STERILE WATER DURING GASTROINTESTINAL ENDOSCOPIC PROCEDURES WHEN USED IN CONJUNCTION WITH AN IRRIGATION PUMP (OR ELECTROSURGICAL UNIT).: Brand: TORRENT